# Patient Record
Sex: FEMALE | Race: WHITE
[De-identification: names, ages, dates, MRNs, and addresses within clinical notes are randomized per-mention and may not be internally consistent; named-entity substitution may affect disease eponyms.]

---

## 2017-02-07 ENCOUNTER — HOSPITAL ENCOUNTER (EMERGENCY)
Dept: HOSPITAL 62 - ER | Age: 52
LOS: 1 days | Discharge: HOME | End: 2017-02-08
Payer: MEDICARE

## 2017-02-07 DIAGNOSIS — N30.00: Primary | ICD-10-CM

## 2017-02-07 DIAGNOSIS — R53.1: ICD-10-CM

## 2017-02-07 DIAGNOSIS — R53.81: ICD-10-CM

## 2017-02-07 DIAGNOSIS — R11.2: ICD-10-CM

## 2017-02-07 LAB
ANION GAP SERPL CALC-SCNC: 14 MMOL/L (ref 5–19)
APPEARANCE UR: (no result)
BASOPHILS # BLD AUTO: 0 10^3/UL (ref 0–0.2)
BASOPHILS NFR BLD AUTO: 0.2 % (ref 0–2)
BILIRUB UR QL STRIP: NEGATIVE
BUN SERPL-MCNC: 15 MG/DL (ref 7–20)
CALCIUM: 10 MG/DL (ref 8.4–10.2)
CHLORIDE SERPL-SCNC: 108 MMOL/L (ref 98–107)
CO2 SERPL-SCNC: 21 MMOL/L (ref 22–30)
CREAT SERPL-MCNC: 0.5 MG/DL (ref 0.52–1.25)
EOSINOPHIL # BLD AUTO: 0.1 10^3/UL (ref 0–0.6)
EOSINOPHIL NFR BLD AUTO: 0.9 % (ref 0–6)
ERYTHROCYTE [DISTWIDTH] IN BLOOD BY AUTOMATED COUNT: 14 % (ref 11.5–14)
GLUCOSE SERPL-MCNC: 148 MG/DL (ref 75–110)
GLUCOSE UR STRIP-MCNC: NEGATIVE MG/DL
HCT VFR BLD CALC: 40.5 % (ref 36–47)
HGB BLD-MCNC: 13.5 G/DL (ref 12–15.5)
HGB HCT DIFFERENCE: 0
KETONES UR STRIP-MCNC: 20 MG/DL
LYMPHOCYTES # BLD AUTO: 1.9 10^3/UL (ref 0.5–4.7)
LYMPHOCYTES NFR BLD AUTO: 20.2 % (ref 13–45)
MCH RBC QN AUTO: 27.2 PG (ref 27–33.4)
MCHC RBC AUTO-ENTMCNC: 33.4 G/DL (ref 32–36)
MCV RBC AUTO: 82 FL (ref 80–97)
MONOCYTES # BLD AUTO: 0.5 10^3/UL (ref 0.1–1.4)
MONOCYTES NFR BLD AUTO: 5.5 % (ref 3–13)
NEUTROPHILS # BLD AUTO: 7 10^3/UL (ref 1.7–8.2)
NEUTS SEG NFR BLD AUTO: 73.2 % (ref 42–78)
NITRITE UR QL STRIP: POSITIVE
PH UR STRIP: 6 [PH] (ref 5–9)
POTASSIUM SERPL-SCNC: 4.8 MMOL/L (ref 3.6–5)
PROT UR STRIP-MCNC: NEGATIVE MG/DL
RBC # BLD AUTO: 4.97 10^6/UL (ref 3.72–5.28)
SODIUM SERPL-SCNC: 142.9 MMOL/L (ref 137–145)
SP GR UR STRIP: 1.01
UROBILINOGEN UR-MCNC: NEGATIVE MG/DL (ref ?–2)
WBC # BLD AUTO: 9.5 10^3/UL (ref 4–10.5)

## 2017-02-07 PROCEDURE — 87804 INFLUENZA ASSAY W/OPTIC: CPT

## 2017-02-07 PROCEDURE — 80048 BASIC METABOLIC PNL TOTAL CA: CPT

## 2017-02-07 PROCEDURE — 36415 COLL VENOUS BLD VENIPUNCTURE: CPT

## 2017-02-07 PROCEDURE — 96372 THER/PROPH/DIAG INJ SC/IM: CPT

## 2017-02-07 PROCEDURE — 85025 COMPLETE CBC W/AUTO DIFF WBC: CPT

## 2017-02-07 PROCEDURE — 81001 URINALYSIS AUTO W/SCOPE: CPT

## 2017-02-07 PROCEDURE — 51701 INSERT BLADDER CATHETER: CPT

## 2017-02-07 PROCEDURE — 99285 EMERGENCY DEPT VISIT HI MDM: CPT

## 2017-02-07 NOTE — ER DOCUMENT REPORT
ED General





<DOMINGO RUSSELL - Last Filed: 17 20:34>





- General


Mode of Arrival: Ambulatory


Information source: Patient


TRAVEL OUTSIDE OF THE [place] IN LAST 30 DAYS: No





- HPI


Patient complains to provider of: Generalized Malaise


Onset: Other - few days ago


Associated symptoms: Other - see above





<GUILLERMO WARNER - Last Filed: 17 23:05>





- General


Chief Complaint: Nausea/Vomiting


Stated Complaint: WEAKNESS


Notes: 


51-year-old female with history of MS presents to the ED via EMS complaining of 

generalized malaise that began a few days ago.  Patient states that she felt 

nauseous this morning and proceeded to have episodes of dry heaving and 

eventually vomited once.  Patient denies any diarrhea.  Patient states that 

this morning when she woke up she felt a little off and noticed that her blood 

sugar was 130 when it usually runs between 90 and 100.  Patient states that she 

is borderline diabetic.  Patient states that she has a history of UTIs. (GUILLERMO AWRNER)





- Related Data


Allergies/Adverse Reactions: 


 





latex [Latex] Allergy (Unknown, Verified 02/25/15 17:35)


 


Sulfa (Sulfonamide Antibiotics) Allergy (Verified 02/25/15 17:35)


 











Past Medical History





- General


Information source: Patient





- Social History


Smoking Status: Unknown if Ever Smoked


Family History: None, Reviewed & Not Pertinent





- Past Medical History


Cardiac Medical History: Reports: Hx Pulmonary Embolism - 2 years ago


Endocrine Medical History: Reports: Hx Diabetes Mellitus Type 2


Musculoskeltal Medical History: Reports Hx Multiple Sclerosis


Past Surgical History: Reports: Hx  Section - x2, Hx Cholecystectomy





- Immunizations


Hx Diphtheria, Pertussis, Tetanus Vaccination: No





<GUILLERMO WARNER - Last Filed: 17 23:05>





Review of Systems





- Review of Systems


Constitutional: See HPI, Malaise


EENT: No symptoms reported


Cardiovascular: No symptoms reported


Respiratory: No symptoms reported


Gastrointestinal: See HPI, Nausea, Vomiting.  denies: Diarrhea


Genitourinary: No symptoms reported


Female Genitourinary: No symptoms reported


Musculoskeletal: No symptoms reported


Skin: No symptoms reported


Hematologic/Lymphatic: No symptoms reported


Neurological/Psychological: No symptoms reported


-: Yes All other systems reviewed and negative





<GUILLERMO WARNER - Last Filed: 17 23:05>





Physical Exam





- General


General appearance: Alert


In distress: None





- HEENT


Head: Normocephalic, Atraumatic


Eyes: Normal


Extraocular movements intact: Yes


Pupils: PERRL





- Respiratory


Respiratory status: No respiratory distress


Breath sounds: Normal





- Cardiovascular


Rhythm: Regular


Heart sounds: Normal auscultation





- Abdominal


Inspection: Normal


Distension: No distension


Bowel sounds: Normal


Tenderness: Nontender





- Back


Back: Normal





- Extremities


General upper extremity: Normal inspection, Normal ROM


General lower extremity: Normal inspection, Normal ROM





- Neurological


Neuro grossly intact: Yes


Cognition: Normal


Orientation: AAOx4


Lily Coma Scale Eye Opening: Spontaneous


Patricia Coma Scale Verbal: Oriented


Patricia Coma Scale Motor: Obeys Commands


Lily Coma Scale Total: 15


Speech: Normal





- Psychological


Associated symptoms: Normal affect, Normal mood





- Skin


Skin Temperature: Warm


Skin Moisture: Dry


Skin Color: Normal





<GUILLERMO WARNER - Last Filed: 17 23:05>





- Vital signs


Vitals: 


 











Temp Pulse Resp BP Pulse Ox


 


 98.5 F   55 L  18   148/90 H  99 


 


 17 16:00  17 16:00  17 16:00  17 16:00  17 16:00








 (DOMINGO RUSSELL)





Course





- Laboratory


Result Diagrams: 


 17 18:30





 17 16:44





<DOMINGO RUSSELL - Last Filed: 17 20:34>





- Laboratory


Result Diagrams: 


 17 18:30





 17 16:44





<GUILLERMO WARNER - Last Filed: 17 23:05>





- Re-evaluation


Re-evalutation: 





17 20:3





I personally performed the services described in the documentation, reviewed 

and edited the documentation which was dictated to my scribe in my presence, 

and it accurately records my words and actions.








Patient with a history of multiple sclerosis bedbound nausea 2 episodes of 

vomiting and fatigue positive for urinary tract infection. No acute clinical 

concerns for pyelonephritis. Patient is able to tolerate by mouth fluids nausea 

vomiting is controlled fever shot of Rocephin will DC on Macrobid 1-2 day follow

-up primary care physician and discussed reasons for ED return sooner





 (DOMINGO RUSSELL)





- Vital Signs


Vital signs: 


 











Temp Pulse Resp BP Pulse Ox


 


 98.5 F   55 L  18   148/90 H  99 


 


 17 16:00  17 16:00  17 16:00  17 16:00  17 16:00








 (DOMINGO RUSSELL)


 (GUILLERMO WARNER)





- Laboratory


Laboratory results interpreted by me: 


 











  17





  16:44 19:02


 


Chloride  108 H 


 


Carbon Dioxide  21 L 


 


Creatinine  0.50 L 


 


Glucose  148 H 


 


Urine Ketones   20 H


 


Urine Nitrite   POSITIVE H


 


Ur Leukocyte Esterase   MODERATE H








 (DOMINGO RUSSELL)





Discharge





<DOMINGO RUSSELL - Last Filed: 17 20:34>





<GUILLERMO WARNER - Last Filed: 17 23:05>





- Discharge


Clinical Impression: 


Vomiting


Qualifiers:


 Vomiting type: unspecified Vomiting Intractability: unspecified Nausea presence

: with nausea Qualified Code(s): R11.2 - Nausea with vomiting, unspecified





UTI (urinary tract infection)


Qualifiers:


 Urinary tract infection type: acute cystitis Hematuria presence: without 

hematuria Qualified Code(s): N30.00 - Acute cystitis without hematuria





Condition: Stable


Disposition: HOME, SELF-CARE


Instructions:  Urinary Tract Infection ([facility]), Vomiting ([facility])


Additional Instructions: 


Urinary Tract Infection





     Your evaluation indicates that you have a urinary tract infection. This is 

due to germs growing in the bladder.  This is a common problem.


     This infection usually responds quickly to antibiotics.  Your antibiotic 

should be taken exactly as prescribed.  Drink plenty of fluids -- three to four 

quarts a day.


     Occasionally, a bladder anesthetic will be prescribed to help stop the 

feeling of urgency until the antibiotic has a chance to clear the infection.  

This may cause your urine to be dark orange.


     Certain urine infections require a culture.  If the doctor obtained a 

culture, the results will be back in two days.  You should call to see if a 

change in treatment is needed.


     A repeat urinalysis after you finish treatment is often recommended.  The 

physician will let you know if further testing is required.


     Call the doctor if you develop fever, chills, flank pain, inability to 

urinate, or blood in the urine.








Prescriptions: 


Nitrofurantoin/Nitrofuran Mac [Macrobid 100 mg Capsule] 1 tab PO BID #20 capsule


Referrals: 


LESLIE REED MD [Primary Care Provider] - Follow up tomorrow (in 1-2 

days return to er sooner for increasing worsening or new symptoms)





Scribe Documentation





- Scribe


Written by Yovanny:: Yovanny Acharya, 2017 1854


acting as scribe for :: Phil





<GUILLERMO WARNER - Last Filed: 17 23:05>

## 2017-02-08 VITALS — DIASTOLIC BLOOD PRESSURE: 64 MMHG | SYSTOLIC BLOOD PRESSURE: 124 MMHG

## 2017-12-21 ENCOUNTER — HOSPITAL ENCOUNTER (EMERGENCY)
Dept: HOSPITAL 62 - ER | Age: 52
LOS: 1 days | Discharge: ANOTHER HOSPITAL | End: 2017-12-22
Payer: MEDICARE

## 2017-12-21 DIAGNOSIS — G35: ICD-10-CM

## 2017-12-21 DIAGNOSIS — R05: ICD-10-CM

## 2017-12-21 DIAGNOSIS — Z86.711: ICD-10-CM

## 2017-12-21 DIAGNOSIS — R07.81: ICD-10-CM

## 2017-12-21 DIAGNOSIS — Z91.040: ICD-10-CM

## 2017-12-21 DIAGNOSIS — Z74.01: ICD-10-CM

## 2017-12-21 DIAGNOSIS — L53.9: ICD-10-CM

## 2017-12-21 DIAGNOSIS — Z88.2: ICD-10-CM

## 2017-12-21 DIAGNOSIS — J96.01: Primary | ICD-10-CM

## 2017-12-21 DIAGNOSIS — J18.9: ICD-10-CM

## 2017-12-21 DIAGNOSIS — R21: ICD-10-CM

## 2017-12-21 DIAGNOSIS — E11.9: ICD-10-CM

## 2017-12-21 LAB
ALBUMIN SERPL-MCNC: 4.2 G/DL (ref 3.5–5)
ALP SERPL-CCNC: 107 U/L (ref 38–126)
ALT SERPL-CCNC: 33 U/L (ref 9–52)
ANION GAP SERPL CALC-SCNC: 13 MMOL/L (ref 5–19)
AST SERPL-CCNC: 20 U/L (ref 14–36)
BASE EXCESS BLDA CALC-SCNC: -0.6 MMOL/L
BASE EXCESS BLDV CALC-SCNC: -0.4 MMOL/L
BASOPHILS # BLD AUTO: 0.1 10^3/UL (ref 0–0.2)
BASOPHILS NFR BLD AUTO: 1 % (ref 0–2)
BILIRUB DIRECT SERPL-MCNC: 0.3 MG/DL (ref 0–0.4)
BILIRUB SERPL-MCNC: 0.9 MG/DL (ref 0.2–1.3)
BUN SERPL-MCNC: 15 MG/DL (ref 7–20)
CALCIUM: 10.2 MG/DL (ref 8.4–10.2)
CHLORIDE SERPL-SCNC: 106 MMOL/L (ref 98–107)
CO2 SERPL-SCNC: 22 MMOL/L (ref 22–30)
CREAT SERPL-MCNC: 0.53 MG/DL (ref 0.52–1.25)
EOSINOPHIL # BLD AUTO: 0.2 10^3/UL (ref 0–0.6)
EOSINOPHIL NFR BLD AUTO: 2.6 % (ref 0–6)
ERYTHROCYTE [DISTWIDTH] IN BLOOD BY AUTOMATED COUNT: 14.1 % (ref 11.5–14)
GLUCOSE SERPL-MCNC: 104 MG/DL (ref 75–110)
HCO3 BLDV-SCNC: 23.6 MMOL/L (ref 20–32)
HCT VFR BLD CALC: 38.7 % (ref 36–47)
HGB BLD-MCNC: 12.9 G/DL (ref 12–15.5)
HGB HCT DIFFERENCE: 0
LYMPHOCYTES # BLD AUTO: 2.1 10^3/UL (ref 0.5–4.7)
LYMPHOCYTES NFR BLD AUTO: 22.2 % (ref 13–45)
MCH RBC QN AUTO: 27.6 PG (ref 27–33.4)
MCHC RBC AUTO-ENTMCNC: 33.4 G/DL (ref 32–36)
MCV RBC AUTO: 83 FL (ref 80–97)
MONOCYTES # BLD AUTO: 0.5 10^3/UL (ref 0.1–1.4)
MONOCYTES NFR BLD AUTO: 5.3 % (ref 3–13)
NEUTROPHILS # BLD AUTO: 6.5 10^3/UL (ref 1.7–8.2)
NEUTS SEG NFR BLD AUTO: 68.9 % (ref 42–78)
PCO2 BLDV: 36.5 MMHG (ref 35–63)
PH BLDV: 7.43 [PH] (ref 7.3–7.42)
POTASSIUM SERPL-SCNC: 4 MMOL/L (ref 3.6–5)
PROT SERPL-MCNC: 7.6 G/DL (ref 6.3–8.2)
PROTHROMBIN TIME: 13.4 SEC (ref 11.4–15.4)
RBC # BLD AUTO: 4.68 10^6/UL (ref 3.72–5.28)
SAO2 % BLDA: 94.6 % (ref 94–98)
SODIUM SERPL-SCNC: 141 MMOL/L (ref 137–145)
WBC # BLD AUTO: 9.4 10^3/UL (ref 4–10.5)

## 2017-12-21 PROCEDURE — 99291 CRITICAL CARE FIRST HOUR: CPT

## 2017-12-21 PROCEDURE — 82803 BLOOD GASES ANY COMBINATION: CPT

## 2017-12-21 PROCEDURE — 87040 BLOOD CULTURE FOR BACTERIA: CPT

## 2017-12-21 PROCEDURE — 71275 CT ANGIOGRAPHY CHEST: CPT

## 2017-12-21 PROCEDURE — 71010: CPT

## 2017-12-21 PROCEDURE — 80053 COMPREHEN METABOLIC PANEL: CPT

## 2017-12-21 PROCEDURE — 93010 ELECTROCARDIOGRAM REPORT: CPT

## 2017-12-21 PROCEDURE — 87186 SC STD MICRODIL/AGAR DIL: CPT

## 2017-12-21 PROCEDURE — 83605 ASSAY OF LACTIC ACID: CPT

## 2017-12-21 PROCEDURE — 85610 PROTHROMBIN TIME: CPT

## 2017-12-21 PROCEDURE — 51701 INSERT BLADDER CATHETER: CPT

## 2017-12-21 PROCEDURE — 96360 HYDRATION IV INFUSION INIT: CPT

## 2017-12-21 PROCEDURE — 93005 ELECTROCARDIOGRAM TRACING: CPT

## 2017-12-21 PROCEDURE — 85025 COMPLETE CBC W/AUTO DIFF WBC: CPT

## 2017-12-21 PROCEDURE — 87086 URINE CULTURE/COLONY COUNT: CPT

## 2017-12-21 PROCEDURE — 87088 URINE BACTERIA CULTURE: CPT

## 2017-12-21 PROCEDURE — 81001 URINALYSIS AUTO W/SCOPE: CPT

## 2017-12-21 PROCEDURE — 87077 CULTURE AEROBIC IDENTIFY: CPT

## 2017-12-21 PROCEDURE — 36415 COLL VENOUS BLD VENIPUNCTURE: CPT

## 2017-12-21 NOTE — ER DOCUMENT REPORT
ED General





- General


Chief Complaint: Shortness Of Breath


Stated Complaint: RESPIRATORY DISTRESS


Time Seen by Provider: 17 22:05


Notes: 





52-year-old lady with debilitating multiple sclerosis, bedbound at baseline 

with history of multiple infections, presenting with 1 week of "not feeling good

" constant worsening now associated with moist cough and right-sided pleuritic 

chest pain.  Hypoxic when EMS arrived.  They did not describe any hypertension 

or tachycardia.  No measured fevers.  She has areas of concern on her skin in 

her left armpit and right buttock, the son states the right buttock is just a 

rash he has been addressing it, no skin breakdown.


TRAVEL OUTSIDE OF THE [place] IN LAST 30 DAYS: No





- Related Data


Allergies/Adverse Reactions: 


 





latex [Latex] Allergy (Unknown, Verified 02/25/15 17:35)


 


Sulfa (Sulfonamide Antibiotics) Allergy (Verified 02/25/15 17:35)


 











Past Medical History





- Social History


Smoking Status: Never Smoker


Family History: None, Reviewed & Not Pertinent





- Past Medical History


Cardiac Medical History: Reports: Hx Pulmonary Embolism - 2 years ago


   Denies: Hx Atrial Fibrillation, Hx Congestive Heart Failure, Hx Heart Attack

, Hx Hypercholesterolemia, Hx Hypertension


Pulmonary Medical History: 


   Denies: Hx Asthma, Hx Bronchitis, Hx COPD, Hx Pneumonia, Hx Respiratory 

Failure, Hx Sleep Apnea, Hx Tuberculosis


Neurological Medical History: Denies: Hx Cerebrovascular Accident, Hx Migraine, 

Hx Seizures


Endocrine Medical History: Reports: Hx Diabetes Mellitus Type 2.  Denies: Hx 

Diabetes Mellitus Type 1


Renal/ Medical History: Denies: Hx End Stage Renal Disease, Hx Kidney Stones


Malignancy Medical History: Denies: Hx Leukemia, Hx Lung Cancer


GI Medical History: Denies: Hx Gastroesophageal Reflux Disease, Hx Hiatal Hernia

, Hx Ulcer


Musculoskeltal Medical History: Denies Hx Arthritis, Reports Hx Multiple 

Sclerosis


Psychiatric Medical History: 


   Denies: Hx Attention Deficit Hyperactivity Disorder, Hx Bipolar Disorder, Hx 

Dementia, Hx Depression, Hx Schizophrenia


Infectious Medical History: Denies: Hx HIV


Past Surgical History: Reports: Hx  Section - x2, Hx Cholecystectomy.  

Denies: Hx Appendectomy, Hx Bowel Surgery, Hx Coronary Artery Bypass Graft, Hx 

Gastric Bypass Surgery, Hx Herniorrhaphy, Hx Hysterectomy, Hx Mastectomy, Hx 

Pacemaker, Hx Tonsillectomy, Hx Tubal Ligation





- Immunizations


Hx Diphtheria, Pertussis, Tetanus Vaccination: No





Review of Systems





- Review of Systems


Notes: 





REVIEW OF SYSTEMS





GEN: D chills weakness


ENT: Denies sore throat, nasal discharge, ear pain


EYES: Denies blurry vision, eye pain, discharge


CV: Denies chest pain, palpitations, edema


RESP: D cough shortness of breath


GI: Denies abdominal pain, nausea, vomiting, diarrhea


MSK: Denies joint pain/swelling, edema, 


SKIN: Template redness


LYMPH: Denies swollen glands/lymph nodes


NEURO: Chronic debilitating weakness


PSYCH: Denies depression, suicidal or homicidal ideation








PHYSICAL EXAMINATION





General: Mild respiratory distress chronically ill-appearing


Head: Atraumatic, normocephalic


ENT: Mouth normal, oropharynx moist, no exudates or tonsillar enlargement


Eyes: Conjunctiva normal, pupils equal, lids normal


Neck: No JVD, supple, no guarding


CVS: Normal rate, regular rhythm, no murmurs


Resp: Right-sided rhonchi, quite wet cough


GI: Nondistended, soft, no tenderness to palpation, no rebound or guarding


Ext: No deformities, no edema, normal range of motion in upper and lower ext


Back: No CVA or midline TTP


Skin: Erythema and maceration in the left armpit


Lymphatic: No lymphadeopathy noted


Neuro: Awake, alert.  Face symmetric.  GCS 15.





Physical Exam





- Vital signs


Vitals: 


 











Resp


 


 20 


 


 17 22:10














Course





- Re-evaluation


Re-evalutation: 





17 22:07


Debilitated bedbound 52-year-old female presenting with signs and symptoms of 

pneumonia, sepsis.


Sepsis protocol initiated.


17 22:52


Patient reassessed.  Difficulty in getting IV access.  Her chest x-ray does not 

show a pneumonia.  We have not obtain a temperature either yet.  Urine has not 

been done.


On reexamining her, she has very low chest rise and is having trouble getting 

words out.  She has a 4-6 L oxygen requirement.  I think her MS is affecting 

her ability to breathe so I ordered a negative inspiratory force measurement.  

If this is decreased she will have to go to a higher level of care which has 

neurology.


17 23:24


Patient's negative inspiratory force is 3.  This is compatible with 

neuromuscular respiratory failure.  She is stable on 6 L of nasal cannula 

oxygen but is having trouble speaking.  Ordered ABG and will transfer patient.  

Spoke with [facility] transfer center at 11:20 PM.


17 01:22


Continues to be stable.  I looked at the CT scan and no it does show some fluid 

in the left subsegmental bronchi do not see any pulmonary emboli.  Urine is 

positive and I have not given antibiotics yet.  I verbalize an order for 1 g of 

Rocephin to the paramedics.  I am comfortable with this given that the patient 

is not septic, she will be treated on the way to [facility].  [facility] 

transport is here for the patient and she is stable for transfer.


17 01:25








- Vital Signs


Vital signs: 


 











Temp Pulse Resp BP Pulse Ox


 


       18   134/78 H  95 


 


       17 01:06  17 23:51  17 01:06














- Laboratory


Result Diagrams: 


 17 22:25





 17 22:25


Laboratory results interpreted by me: 


 











  17





  22:25 22:25 22:25


 


RDW  14.1 H  


 


ABG pO2   


 


VBG pH    7.43 H


 


Lactic Acid   0.6 L 


 


Urine Protein   


 


Urine Blood   


 


Urine Nitrite   


 


Urine Urobilinogen   


 


Ur Leukocyte Esterase   














  17





  23:20 23:40


 


RDW  


 


ABG pO2  69.6 L 


 


VBG pH  


 


Lactic Acid  


 


Urine Protein   30 H


 


Urine Blood   LARGE H


 


Urine Nitrite   POSITIVE H


 


Urine Urobilinogen   4.0 H


 


Ur Leukocyte Esterase   MODERATE H














- Diagnostic Test


Radiology reviewed: Image reviewed, Reports reviewed





- EKG Interpretation by Me


EKG shows normal: Sinus rhythm


Rate: Normal


Rhythm: NSR


When compared to previous EKG there are: No significant change - Nonspecific 

lateral T-wave flattening unchanged





Critical Care Note





- Critical Care Note


Total time excluding time spent on procedures (mins): 35 - The above patient is 

critically ill.  Not including procedures, but including direct re-evaluations, 

speaking with patient and/or consultants, interpreting results, and documenting

, I spent the total amount of minute listed listed above on critical care time





Discharge





- Discharge


Clinical Impression: 


 Acute respiratory failure with hypoxia





Condition: Fair


Disposition: [facility]


Referrals: 


MEG MCCARTHY MD [Primary Care Provider] - Follow up as needed

## 2017-12-21 NOTE — RADIOLOGY REPORT (SQ)
EXAM DESCRIPTION:  CHEST SINGLE VIEW



COMPLETED DATE/TIME:  12/21/2017 10:26 pm



REASON FOR STUDY:  SOB



COMPARISON:  9/22/2016



EXAM PARAMETERS:  NUMBER OF VIEWS: One view.

TECHNIQUE: Single frontal radiographic view of the chest acquired.

RADIATION DOSE: NA

LIMITATIONS: None.



FINDINGS:  LUNGS AND PLEURA: No acute opacities, masses or pneumothorax. No pleural effusion.

MEDIASTINUM AND HILAR STRUCTURES: No masses.  Contour normal.

HEART AND VASCULAR STRUCTURES: Heart normal in size.  Normal vasculature.

BONES: No acute findings.

HARDWARE: None in the chest.

OTHER: No other significant finding.



IMPRESSION:  NO ACUTE RADIOGRAPHIC FINDING IN THE CHEST.



TECHNICAL DOCUMENTATION:  JOB ID:  [id number]

[id number]

 2011 [employer]- All Rights Reserved

## 2017-12-22 VITALS — DIASTOLIC BLOOD PRESSURE: 78 MMHG | SYSTOLIC BLOOD PRESSURE: 134 MMHG

## 2017-12-22 LAB
APPEARANCE UR: (no result)
BILIRUB UR QL STRIP: NEGATIVE
GLUCOSE UR STRIP-MCNC: NEGATIVE MG/DL
KETONES UR STRIP-MCNC: NEGATIVE MG/DL
NITRITE UR QL STRIP: POSITIVE
PH UR STRIP: 5 [PH] (ref 5–9)
PROT UR STRIP-MCNC: 30 MG/DL
SP GR UR STRIP: 1.02
UROBILINOGEN UR-MCNC: 4 MG/DL (ref ?–2)

## 2017-12-22 NOTE — EKG REPORT
SEVERITY:- ABNORMAL ECG -

SINUS RHYTHM

NONSPECIFIC T ABNORMALITIES, ANT-LAT LEADS

:

Confirmed by: Wilson Polanco MD 22-Dec-2017 07:49:18

## 2017-12-22 NOTE — RADIOLOGY REPORT (SQ)
EXAM DESCRIPTION:

CTA CHEST



CLINICAL HISTORY:

52 years Female, eval for PE. R pleuritic chest pain.



COMPARISON:

CR, same day.



TECHNIQUE:

100 mL Isovue-370 IV contrast. Multiplanar reformatted. This exam

was performed according to our departmental dose-optimization

program, which includes automated exposure control, adjustment of

the mA and/or kV according to patient size and/or use of

iterative reconstruction technique.



FINDINGS:

Small left infrahilar, left lower lobar consolidate, and small

streakiness of bilateral lung bases.



No pulmonary embolus. No right ventricular strain.



Inferior neck, axillae, mediastinum, cholecystectomy clips, upper

abdomen, and musculoskeleton appear otherwise unremarkable.



IMPRESSION:

Small left lower lobar pneumonia. No pulmonary embolus.

## 2019-01-24 ENCOUNTER — HOSPITAL ENCOUNTER (INPATIENT)
Dept: HOSPITAL 62 - ER | Age: 54
LOS: 5 days | Discharge: HOME HEALTH | DRG: 690 | End: 2019-01-29
Attending: INTERNAL MEDICINE | Admitting: INTERNAL MEDICINE
Payer: MEDICARE

## 2019-01-24 DIAGNOSIS — Z82.61: ICD-10-CM

## 2019-01-24 DIAGNOSIS — G47.10: ICD-10-CM

## 2019-01-24 DIAGNOSIS — N30.00: Primary | ICD-10-CM

## 2019-01-24 DIAGNOSIS — E66.01: ICD-10-CM

## 2019-01-24 DIAGNOSIS — D64.9: ICD-10-CM

## 2019-01-24 DIAGNOSIS — Z88.2: ICD-10-CM

## 2019-01-24 DIAGNOSIS — E03.9: ICD-10-CM

## 2019-01-24 DIAGNOSIS — Z79.899: ICD-10-CM

## 2019-01-24 DIAGNOSIS — Z91.040: ICD-10-CM

## 2019-01-24 DIAGNOSIS — B96.1: ICD-10-CM

## 2019-01-24 DIAGNOSIS — E11.9: ICD-10-CM

## 2019-01-24 DIAGNOSIS — G35: ICD-10-CM

## 2019-01-24 DIAGNOSIS — L21.9: ICD-10-CM

## 2019-01-24 DIAGNOSIS — Z79.82: ICD-10-CM

## 2019-01-24 DIAGNOSIS — Z86.711: ICD-10-CM

## 2019-01-24 DIAGNOSIS — B95.1: ICD-10-CM

## 2019-01-24 DIAGNOSIS — Z83.3: ICD-10-CM

## 2019-01-24 DIAGNOSIS — Z88.8: ICD-10-CM

## 2019-01-24 DIAGNOSIS — Z91.030: ICD-10-CM

## 2019-01-24 DIAGNOSIS — J30.9: ICD-10-CM

## 2019-01-24 DIAGNOSIS — E55.9: ICD-10-CM

## 2019-01-24 DIAGNOSIS — Z91.19: ICD-10-CM

## 2019-01-24 DIAGNOSIS — Z90.49: ICD-10-CM

## 2019-01-24 DIAGNOSIS — Z80.9: ICD-10-CM

## 2019-01-24 DIAGNOSIS — Z82.49: ICD-10-CM

## 2019-01-24 DIAGNOSIS — Z83.2: ICD-10-CM

## 2019-01-24 DIAGNOSIS — K59.09: ICD-10-CM

## 2019-01-24 LAB
ADD MANUAL DIFF: NO
ALBUMIN SERPL-MCNC: 4.3 G/DL (ref 3.5–5)
ALP SERPL-CCNC: 149 U/L (ref 38–126)
ALT SERPL-CCNC: 33 U/L (ref 9–52)
ANION GAP SERPL CALC-SCNC: 12 MMOL/L (ref 5–19)
APPEARANCE UR: (no result)
APTT PPP: (no result) S
ARTERIAL BLOOD FIO2: (no result)
ARTERIAL BLOOD H2CO3: 0.95 MMOL/L (ref 1.05–1.35)
ARTERIAL BLOOD HCO3: 19 MMOL/L (ref 20–24)
ARTERIAL BLOOD PCO2: 31.7 MMHG (ref 35–45)
ARTERIAL BLOOD PH: 7.4 (ref 7.35–7.45)
ARTERIAL BLOOD PO2: 100 MMHG (ref 80–100)
ARTERIAL BLOOD TOTAL CO2: 19.9 MMOL/L (ref 21–25)
AST SERPL-CCNC: 55 U/L (ref 14–36)
BASE EXCESS BLDA CALC-SCNC: -4.8 MMOL/L
BASOPHILS # BLD AUTO: 0 10^3/UL (ref 0–0.2)
BASOPHILS NFR BLD AUTO: 0.3 % (ref 0–2)
BILIRUB DIRECT SERPL-MCNC: 0.6 MG/DL (ref 0–0.4)
BILIRUB SERPL-MCNC: 0.8 MG/DL (ref 0.2–1.3)
BILIRUB UR QL STRIP: NEGATIVE
BUN SERPL-MCNC: 21 MG/DL (ref 7–20)
CALCIUM: 9.6 MG/DL (ref 8.4–10.2)
CHLORIDE SERPL-SCNC: 107 MMOL/L (ref 98–107)
CK MB SERPL-MCNC: 0.61 NG/ML (ref ?–4.55)
CK SERPL-CCNC: 90 U/L (ref 30–135)
CO2 SERPL-SCNC: 21 MMOL/L (ref 22–30)
EOSINOPHIL # BLD AUTO: 0 10^3/UL (ref 0–0.6)
EOSINOPHIL NFR BLD AUTO: 0 % (ref 0–6)
ERYTHROCYTE [DISTWIDTH] IN BLOOD BY AUTOMATED COUNT: 14.2 % (ref 11.5–14)
GLUCOSE SERPL-MCNC: 175 MG/DL (ref 75–110)
GLUCOSE UR STRIP-MCNC: NEGATIVE MG/DL
HCT VFR BLD CALC: 42.7 % (ref 36–47)
HGB BLD-MCNC: 14 G/DL (ref 12–15.5)
KETONES UR STRIP-MCNC: 20 MG/DL
LYMPHOCYTES # BLD AUTO: 0.5 10^3/UL (ref 0.5–4.7)
LYMPHOCYTES NFR BLD AUTO: 7.1 % (ref 13–45)
MCH RBC QN AUTO: 26.7 PG (ref 27–33.4)
MCHC RBC AUTO-ENTMCNC: 32.8 G/DL (ref 32–36)
MCV RBC AUTO: 82 FL (ref 80–97)
MONOCYTES # BLD AUTO: 0.3 10^3/UL (ref 0.1–1.4)
MONOCYTES NFR BLD AUTO: 3.8 % (ref 3–13)
NEUTROPHILS # BLD AUTO: 6.9 10^3/UL (ref 1.7–8.2)
NEUTS SEG NFR BLD AUTO: 88.8 % (ref 42–78)
NITRITE UR QL STRIP: POSITIVE
NT PRO BNP: 487 PG/ML (ref 5–900)
PH UR STRIP: 7 [PH] (ref 5–9)
PLATELET # BLD: 248 10^3/UL (ref 150–450)
POTASSIUM SERPL-SCNC: 4.4 MMOL/L (ref 3.6–5)
PROT SERPL-MCNC: 8.1 G/DL (ref 6.3–8.2)
PROT UR STRIP-MCNC: >=500 MG/DL
RBC # BLD AUTO: 5.24 10^6/UL (ref 3.72–5.28)
SAO2 % BLDA: 97.6 % (ref 94–98)
SODIUM SERPL-SCNC: 140.1 MMOL/L (ref 137–145)
SP GR UR STRIP: 1.02
TOTAL CELLS COUNTED % (AUTO): 100 %
UROBILINOGEN UR-MCNC: 2 MG/DL (ref ?–2)
WBC # BLD AUTO: 7.7 10^3/UL (ref 4–10.5)

## 2019-01-24 PROCEDURE — 82550 ASSAY OF CK (CPK): CPT

## 2019-01-24 PROCEDURE — 80053 COMPREHEN METABOLIC PANEL: CPT

## 2019-01-24 PROCEDURE — 83605 ASSAY OF LACTIC ACID: CPT

## 2019-01-24 PROCEDURE — 87086 URINE CULTURE/COLONY COUNT: CPT

## 2019-01-24 PROCEDURE — 85025 COMPLETE CBC W/AUTO DIFF WBC: CPT

## 2019-01-24 PROCEDURE — 82565 ASSAY OF CREATININE: CPT

## 2019-01-24 PROCEDURE — 80202 ASSAY OF VANCOMYCIN: CPT

## 2019-01-24 PROCEDURE — 81001 URINALYSIS AUTO W/SCOPE: CPT

## 2019-01-24 PROCEDURE — 82803 BLOOD GASES ANY COMBINATION: CPT

## 2019-01-24 PROCEDURE — 93010 ELECTROCARDIOGRAM REPORT: CPT

## 2019-01-24 PROCEDURE — 80048 BASIC METABOLIC PNL TOTAL CA: CPT

## 2019-01-24 PROCEDURE — 82553 CREATINE MB FRACTION: CPT

## 2019-01-24 PROCEDURE — 87088 URINE BACTERIA CULTURE: CPT

## 2019-01-24 PROCEDURE — 99285 EMERGENCY DEPT VISIT HI MDM: CPT

## 2019-01-24 PROCEDURE — 71045 X-RAY EXAM CHEST 1 VIEW: CPT

## 2019-01-24 PROCEDURE — 83880 ASSAY OF NATRIURETIC PEPTIDE: CPT

## 2019-01-24 PROCEDURE — 87186 SC STD MICRODIL/AGAR DIL: CPT

## 2019-01-24 PROCEDURE — 36415 COLL VENOUS BLD VENIPUNCTURE: CPT

## 2019-01-24 PROCEDURE — 93005 ELECTROCARDIOGRAM TRACING: CPT

## 2019-01-24 PROCEDURE — 82962 GLUCOSE BLOOD TEST: CPT

## 2019-01-24 PROCEDURE — 87040 BLOOD CULTURE FOR BACTERIA: CPT

## 2019-01-24 RX ADMIN — LEVOFLOXACIN SCH MLS/HR: 500 INJECTION, SOLUTION INTRAVENOUS at 15:18

## 2019-01-24 RX ADMIN — SODIUM CHLORIDE PRN MLS/HR: 9 INJECTION, SOLUTION INTRAVENOUS at 14:42

## 2019-01-24 RX ADMIN — CEFEPIME HYDROCHLORIDE SCH MLS/HR: 2 INJECTION, SOLUTION INTRAVENOUS at 14:41

## 2019-01-24 RX ADMIN — VANCOMYCIN HYDROCHLORIDE SCH MLS/HR: 1 INJECTION, POWDER, LYOPHILIZED, FOR SOLUTION INTRAVENOUS at 18:06

## 2019-01-24 NOTE — ER DOCUMENT REPORT
Sepsis





- Sepsis Documentation


Sepsis Patient: Yes





- Vital Signs


Interpretation: Tachycardic, Febrile





- Cardiovascular


Peripheral Pulse Strength: Normal


Capillary refill: Delayed


Rhythm: Tachycardia


Heart Sounds: Normal auscultation





- Respiratory


Breath Sounds: Rhonchi, Decreased air movement


Respiratory Status: Depressed respirations





- Skin


Skin Color: Pale, Mottled

## 2019-01-24 NOTE — RADIOLOGY REPORT (SQ)
EXAM DESCRIPTION: 



XR CHEST 1 VIEW



COMPLETED DATE/TME:  01/24/2019 02:01



CLINICAL HISTORY: 



53 years, Female, Cough



COMPARISON:

None.



NUMBER OF VIEWS:

One



TECHNIQUE:

AP view of the chest



LIMITATIONS:

None.



FINDINGS:



The lungs are clear. There are no pleural abnormalities. The

cardiac silhouette and pulmonary vessels are normal.



IMPRESSION:



No acute cardiopulmonary disease.

 



copyright 2011 [employer]- All Rights Reserved

## 2019-01-24 NOTE — EKG REPORT
SEVERITY:- ABNORMAL ECG -

SINUS TACHYCARDIA

NONSPECIFIC REPOL ABNORMALITY, INFERIOR LEADS

:

Confirmed by: Mahnaz Cortez MD 24-Jan-2019 08:04:07

## 2019-01-24 NOTE — EKG REPORT
SEVERITY:- OTHERWISE NORMAL ECG -

SINUS TACHYCARDIA

:

Confirmed by: Mahnaz Cortez MD 24-Jan-2019 08:03:57

## 2019-01-24 NOTE — PDOC H&P
History of Present Illness


Admission Date/PCP: 


  19 05:52





  [facility]





Patient complains of: Shortness of breath and weakness


History of Present Illness: 


VI PAIGE is a 53 year old female known to my practice but not compliant

with follow up in the office presented to the ED with family complaining about 

worsening difficulty with breathing, generalized weakness and at the time of my 

evaluation associated malodor to her urine for couple of days. She reported 

episodes of fever, chills, nausea, poor appetite and oral intake. She reported 

chest and sinus congestion, associated  coughing with expectoration difficulty. 

No headache or facial pain. She denied any flank pain or definite pain with 

urination. She denied any blood in her urine. Her initial evaluation in the ED 

was significant for tachycardia, tachypnea, minimal verbal response and 

demonstrated alteration in her mental alertness. There was concern for possible 

sepsis due to associated low blood pressure and fever. Her laboratory evaluation

with catheterized urine was suggestive of UTI with elevated BUN. Her morbidities

include Multiple sclerosis with fatigue and malaise, Diabetes Mellitus type 2, 

Hypothyroidism, vitamin D deficiency, chronic constipation, Anemia, Allergic rh

initis, and morbid obesity. She was advised hospitalization for further 

evaluation and management.





Past Medical History


Cardiac Medical History: Reports: Pulmonary Embolism - 2 years ago


   Denies: Atrial Fibrillation, Congestive Heart Failure, Myocardial Infarction,

Hyperlipidema, Hypertension


Pulmonary Medical History: Reports: None


   Denies: Asthma, Bronchitis, Chronic Obstructive Pulmonary Disease (COPD), 

Pneumonia, Respiratory Failure, Sleep Apnea, Tuberculosis


EENT Medical History: Reports: None


Neurological Medical History: Reports: None


   Denies: Migraine, Seizures


Endocrine Medical History: Reports: Diabetes Mellitus Type 2, Hypothyroidism, 

Obesity, Other - Vitamin D deficiency


   Denies: Diabetes Mellitus Type 1


Renal/ Medical History: Reports: None


   Denies: End Stage Renal Disease


Malignancy Medical History: Reports: None


   Denies: Leukemia, Lung Cancer


GI Medical History: Reports: None, Other - chronic constipation


   Denies: Gastroesophageal Reflux Disease, Hiatal Hernia


Musculoskeltal Medical History: 


   Denies: Arthritis


Skin Medical History: Reports: None


Psychiatric Medical History: Reports: None, Other - Hypersomnia


   Denies: Attention Deficit Hyperactivity Disorder, Bipolar Disorder, Dementia,

Depression


Traumatic Medical History: Reports: None


Hematology: Reports: Anemia


   Denies: Hemophilia, Sickle Cell Disease


Infectious Medical History: Reports: None


   Denies: HIV





Past Surgical History


Past Surgical History: Reports:  Section - x2, Cholecystectomy


   Denies: Appendectomy, Coronary Artery Bypass Graft, Gastric Bypass Surgery, 

Herniorrhaphy, Hysterectomy, Mastectomy, Pacemaker, Tonsillectomy, Tubal 

Ligation





Social History


Lives with: Family


Smoking Status: Never Smoker


Frequency of Alcohol Use: None


Hx Recreational Drug Use: No


Hx Prescription Drug Abuse: No





- Advance Directive


Resuscitation Status: Full Code





Family History


Family History: None, Reviewed & Not Pertinent


Parental Family History Reviewed: Yes


Children Family History Reviewed: Yes


Sibling(s) Family History Reviewed.: Yes





Medication/Allergy


Home Medications: 








Acetaminophen [Tylenol Extra Strength 500 mg Tablet] 1,000 mg PO Q6 19 


Baclofen [Baclofen 10 mg Tablet] 10 mg PO TID 19 


Modafinil [Provigil] 200 mg PO DAILY 19 








Allergies/Adverse Reactions: 


                                        





latex [Latex] Allergy (Intermediate, Verified 19 14:06)


   RASH/BLISTER


Sulfa (Sulfonamide Antibiotics) Allergy (Intermediate, Verified 19 14:06)


   RASH/HIVES











Review of Systems


Constitutional: PRESENT: anorexia, fatigue, fever(s), weakness


Eyes: ABSENT: visual disturbances


Ears: ABSENT: hearing changes


Nose, Mouth, and Throat: ABSENT: as per HPI, headache(s), mouth pain, sore 

throat, vertigo, other


Cardiovascular: ABSENT: chest pain, dyspnea on exertion, edema, orthropnea, 

palpitations


Respiratory: PRESENT: cough, dyspnea


Gastrointestinal: PRESENT: nausea.  ABSENT: as per HPI, abdominal pain, 

bloating, coffee ground emesis, constipation, diarrhea, dysphagia, heartburn, 

hematemesis, hematochezia, melena, vomiting, other


Genitourinary: PRESENT: other - malodor.  ABSENT: as per HPI, difficulty 

urinating, dysuria, hematuria, nocturia


Musculoskeletal: PRESENT: deformity - related to spastic component of her 

multiple sclerosis.


Integumentary: PRESENT: erythema - left forearm aroun her brace device..  

ABSENT: as per HPI, diaphoresis, lesions, pruritus, rash, wounds, other


Neurological: PRESENT: confusion


Psychiatric: ABSENT: anxiety, depression, homidical ideation, suicidal ideation


Endocrine: ABSENT: cold intolerance, heat intolerance, polydipsia, polyuria


Hematologic/Lymphatic: ABSENT: easy bleeding, easy bruising, lymphadenopathy


Allergic/Immunologic: ABSENT: seasonal rhinorrhea





Physical Exam


Vital Signs: 


                                        











Temp Pulse Resp BP Pulse Ox


 


 98.8 F   118 H  31 H  98/72 L  97 


 


 19 16:00  19 17:23  19 16:00  19 16:00  19 16:00








                                 Intake & Output











 19





 06:59 06:59 06:59


 


Intake Total  1000 150


 


Balance  1000 150


 


Weight  83.4 kg 











General appearance: PRESENT: no acute distress, well-developed, well-nourished


Head exam: PRESENT: atraumatic, normocephalic


Eye exam: PRESENT: conjunctiva pink, EOMI, PERRLA.  ABSENT: scleral icterus


Ear exam: PRESENT: normal external ear exam


Mouth exam: PRESENT: dry mucosa


Respiratory exam: PRESENT: clear to auscultation elver, decreased breath sounds - 

at lung bases


Cardiovascular exam: PRESENT: RRR, +S1, +S2, tachycardia.  ABSENT: diastolic 

murmur, systolic murmur


Vascular exam: PRESENT: normal capillary refill.  ABSENT: pallor


GI/Abdominal exam: PRESENT: normal bowel sounds, soft.  ABSENT: distended, 

guarding, mass, organolmegaly, rebound, tenderness


Rectal exam: PRESENT: deferred


Gentrourinary exam: PRESENT: indwelling catheter - with discolored concentrated 

urine.


Extremities exam: ABSENT: pedal edema


Neurological exam: PRESENT: alert, awake, oriented to person, oriented to place,

 oriented to time, oriented to situation, CN II-XII grossly intact.  ABSENT: 

motor sensory deficit


Psychiatric exam: PRESENT: appropriate affect, normal mood.  ABSENT: homicidal 

ideation, suicidal ideation


Skin exam: PRESENT: dry, erythema - around her forearm brace device, warm





Results


Laboratory Results: 


                                        





                                 19 01:48 





                                 19 01:48 





                                        











  19





  01:48 01:48 01:48


 


WBC  7.7  


 


RBC  5.24  


 


Hgb  14.0  


 


Hct  42.7  


 


MCV  82  


 


MCH  26.7 L  


 


MCHC  32.8  


 


RDW  14.2 H  


 


Plt Count  248  


 


Seg Neutrophils %  88.8 H  


 


Lymphocytes %  7.1 L  


 


Monocytes %  3.8  


 


Eosinophils %  0.0  


 


Basophils %  0.3  


 


Absolute Neutrophils  6.9  


 


Absolute Lymphocytes  0.5  


 


Absolute Monocytes  0.3  


 


Absolute Eosinophils  0.0  


 


Absolute Basophils  0.0  


 


Carbonic Acid   


 


HCO3/H2CO3 Ratio   


 


ABG pH   


 


ABG pCO2   


 


ABG pO2   


 


ABG HCO3   


 


ABG O2 Saturation   


 


ABG Base Excess   


 


FiO2   


 


Sodium   140.1 


 


Potassium   4.4 


 


Chloride   107 


 


Carbon Dioxide   21 L 


 


Anion Gap   12 


 


BUN   21 H 


 


Creatinine   0.55 


 


Est GFR ( Amer)   > 60 


 


Est GFR (Non-Af Amer)   > 60 


 


Glucose   175 H 


 


Lactic Acid    0.9


 


Calcium   9.6 


 


Total Bilirubin   0.8 


 


AST   55 H 


 


ALT   33 


 


Alkaline Phosphatase   149 H 


 


Total Protein   8.1 


 


Albumin   4.3 


 


Urine Color   


 


Urine Appearance   


 


Urine pH   


 


Ur Specific Gravity   


 


Urine Protein   


 


Urine Glucose (UA)   


 


Urine Ketones   


 


Urine Blood   


 


Urine Nitrite   


 


Ur Leukocyte Esterase   


 


Urine WBC (Auto)   


 


Urine RBC (Auto)   














  19





  05:11 05:17


 


WBC  


 


RBC  


 


Hgb  


 


Hct  


 


MCV  


 


MCH  


 


MCHC  


 


RDW  


 


Plt Count  


 


Seg Neutrophils %  


 


Lymphocytes %  


 


Monocytes %  


 


Eosinophils %  


 


Basophils %  


 


Absolute Neutrophils  


 


Absolute Lymphocytes  


 


Absolute Monocytes  


 


Absolute Eosinophils  


 


Absolute Basophils  


 


Carbonic Acid   0.95 L


 


HCO3/H2CO3 Ratio   20:1


 


ABG pH   7.40


 


ABG pCO2   31.7 L


 


ABG pO2   100.0


 


ABG HCO3   19.0 L


 


ABG O2 Saturation   97.6


 


ABG Base Excess   -4.8


 


FiO2   3.5L


 


Sodium  


 


Potassium  


 


Chloride  


 


Carbon Dioxide  


 


Anion Gap  


 


BUN  


 


Creatinine  


 


Est GFR ( Amer)  


 


Est GFR (Non-Af Amer)  


 


Glucose  


 


Lactic Acid  


 


Calcium  


 


Total Bilirubin  


 


AST  


 


ALT  


 


Alkaline Phosphatase  


 


Total Protein  


 


Albumin  


 


Urine Color  CALVIN 


 


Urine Appearance  TURBID 


 


Urine pH  7.0 


 


Ur Specific Gravity  1.018 


 


Urine Protein  >=500 H 


 


Urine Glucose (UA)  NEGATIVE 


 


Urine Ketones  20 H 


 


Urine Blood  SMALL H 


 


Urine Nitrite  POSITIVE H 


 


Ur Leukocyte Esterase  LARGE H 


 


Urine WBC (Auto)  >182 


 


Urine RBC (Auto)  14 








                                        











  19





  01:48 01:48


 


Creatine Kinase  90 


 


CK-MB (CK-2)   0.61


 


NT-Pro-B Natriuret Pep   487











Impressions: 


                                        





Chest X-Ray  19 02:01


IMPRESSION:


 


No acute cardiopulmonary disease.


 


 


copyright 2011 [employer]- All Rights Reserved


 














Assessment & Plan





- Diagnosis


(1) SIRS due to infectious process without acute organ dysfunction


Is this a current diagnosis for this admission?: Yes   


Plan: 


Continue antibiotic therapy and IV fluid resuscitation therapy.








(2) Urinary tract infection


Qualifiers: 


   Urinary tract infection type: acute cystitis   Hematuria presence: without 

hematuria   Qualified Code(s): N30.00 - Acute cystitis without hematuria   


Is this a current diagnosis for this admission?: Yes   


Plan: 


Maintain on IV fluid support with antibiotic therapy. There is concern for 

possible sepsis in view of her associated morbidities. Follow up on her blood 

and urine culture.








(3) Diabetes mellitus type 2 in nonobese


Is this a current diagnosis for this admission?: Yes   


Plan: 


Maintain on accucheck with sliding scale Humalog insulin coverage.








(4) Hypothyroidism


Qualifiers: 


   Hypothyroidism type: unspecified   Qualified Code(s): E03.9 - Hypothyroidism,

 unspecified   


Is this a current diagnosis for this admission?: Yes   


Plan: 


Continue to monitor for symptoms of decompensation related to thyroid function 

and treat ass indicated.








(5) Vitamin D deficiency


Is this a current diagnosis for this admission?: Yes   


Plan: 


Monitor level and treat and indicated.








(6) Multiple sclerosis, primary chronic progressive


Is this a current diagnosis for this admission?: Yes   


Plan: 


Continue supportive care t this time.








- Time


Time Spent: 50 to 70 Minutes


Medications reviewed and adjusted accordingly: Yes


Anticipated discharge: Home with Homehealth


Within: Other





- Inpatient Certification


Based on my medical assessment, after consideration of the patient's comor

bidities, presenting symptoms, or acuity I expect that the services needed 

warrant INPATIENT care.: Yes


I certify that my determination is in accordance with my understanding of 

Medicare's requirements for reasonable and necessary INPATIENT services [42 CFR 

412.3e].: Yes


Medical Necessity: Need Close Monitoring Due to Risk of Patient Decompensation, 

Need For IV Fluids, Need For Continuous Telemetry Monitoring, Need for IV 

Antibiotics, Risk of Complication if Not Cared For in Hospital, Risk of 

Diagnosis Which Will Require Inpatient Eval/Care/Monitoring


Post Hospital Care: D/C Planner Documentation





- Plan Summary


Plan Summary: 





See admitting attending physician orders as per above outlined care plan. I had 

a witnessed discussion with patient regarding resuscitation, at this time she 

want to be a full code status.

## 2019-01-24 NOTE — ER DOCUMENT REPORT
ED Respiratory Problem





- General


Chief Complaint: Breathing Difficulty


Stated Complaint: BREATHING PROBLEMS


Time Seen by Provider: 19 01:41


Primary Care Provider: 


MEG MCCARTHY MD [Primary Care Provider] - Follow up as needed


Mode of Arrival: Stretcher


Information source: Patient, Relative


Cannot obtain history due to: Other - Weakness


Notes: 





Patient is a 53-year-old female with a past medical history of multiple chronic 

health conditions including multiple sclerosis who presents with shortness of 

breath and weakness.  Family states the patient has been having increased work 

of breathing for the past day, has had intermittent fevers, also redness to her 

left arm.  Patient denies being in any pain.  No cough or congestion.





Onset: Yesterday


Provocation: None


Quality: Weakness, tightness


Radiation: None


Severity: Severe


Timing: Constant


TRAVEL OUTSIDE OF THE [place] IN LAST 30 DAYS: No





- HPI


Similar symptoms previously: No


Recently seen / treated by doctor: No





- Related Data


Allergies/Adverse Reactions: 


                                        





latex [Latex] Allergy (Unknown, Verified 02/25/15 17:35)


   


Sulfa (Sulfonamide Antibiotics) Allergy (Verified 02/25/15 17:35)


   











Past Medical History





- General


Information source: Patient, Relative


Cannot obtain history due to: Other - Weakness





- Social History


Smoking Status: Never Smoker


Frequency of alcohol use: None


Drug Abuse: None


Lives with: Family


Family History: None, Reviewed & Not Pertinent


Patient has suicidal ideation: No


Patient has homicidal ideation: No





- Past Medical History


Cardiac Medical History: Reports: Hx Pulmonary Embolism - 2 years ago


   Denies: Hx Atrial Fibrillation, Hx Congestive Heart Failure, Hx Heart Attack,

 Hx Hypercholesterolemia, Hx Hypertension


Pulmonary Medical History: Reports: None


   Denies: Hx Asthma, Hx Bronchitis, Hx COPD, Hx Pneumonia, Hx Respiratory 

Failure, Hx Sleep Apnea, Hx Tuberculosis


EENT Medical History: Reports: None


Neurological Medical History: Reports: None.  Denies: Hx Cerebrovascular 

Accident, Hx Migraine, Hx Seizures


Endocrine Medical History: Reports: Hx Diabetes Mellitus Type 2.  Denies: Hx 

Diabetes Mellitus Type 1


Renal/ Medical History: Reports: None.  Denies: Hx End Stage Renal Disease, Hx

 Kidney Stones, Hx Peritoneal Dialysis


Malignancy Medical History: Reports: None.  Denies: Hx Leukemia, Hx Lung Cancer


GI Medical History: Reports: None.  Denies: Hx Gastroesophageal Reflux Disease, 

Hx Hiatal Hernia, Hx Ulcer


Musculoskeletal Medical History: Denies Hx Arthritis, Reports Hx Multiple 

Sclerosis


Skin Medical History: Reports None


Psychiatric Medical History: Reports: None


   Denies: Hx Attention Deficit Hyperactivity Disorder, Hx Bipolar Disorder, Hx 

Dementia, Hx Depression, Hx Schizophrenia


Traumatic Medical History: Reports: None


Infectious Medical History: Reports: None.  Denies: Hx HIV


Past Surgical History: Reports: Hx  Section - x2, Hx Cholecystectomy.  

Denies: Hx Appendectomy, Hx Bowel Surgery, Hx Coronary Artery Bypass Graft, Hx 

Gastric Bypass Surgery, Hx Herniorrhaphy, Hx Hysterectomy, Hx Mastectomy, Hx 

Pacemaker, Hx Tonsillectomy, Hx Tubal Ligation





- Immunizations


Immunizations up to date: Yes


Hx Diphtheria, Pertussis, Tetanus Vaccination: Yes


History of Influenza Vaccine for 10/2017 - 3/2018 Season: Unknown





Review of Systems





- Review of Systems


Notes: 





REVIEW OF SYSTEMS:





CONSTITUTIONAL : Positive fever,  chills, and sweats.  Denies recent illness.


EENT:   Denies eye, ear, throat, or mouth pain or symptoms.  Denies nasal or 

sinus congestion.


CARDIOVASCULAR:  Denies chest pain.


RESPIRATORY:  Denies cough, cold, or chest congestion.  Denies shortness of 

breath, difficulty breathing, or wheezing.


GASTROINTESTINAL:  Denies abdominal pain.  Denies nausea, vomiting, or diarrhea.

  Denies constipation.


GENITOURINARY:  Denies difficulty urinating, painful urination, burning, 

frequency, or blood in urine.


FEMALE  GENITOURINARY:  Denies vaginal bleeding, abnormal or irregular periods.


MUSCULOSKELETAL:  Denies neck or back pain or joint pain or swelling.  Positive 

redness to the left arm.


SKIN:   Denies rash or skin lesions.


HEMATOLOGIC :   Denies easy bruising or bleeding.


LYMPHATIC:  Denies swollen, enlarged glands.


NEUROLOGICAL:  Denies altered mental status or loss of consciousness.  Denies 

headache.  Denies weakness or paralysis or loss of use of either side.  Denies 

problems with gait or speech.  Denies sensory or motor loss.


PSYCHIATRIC:  Denies anxiety or stress or depression.


ALL OTHER SYSTEMS REVIEWED AND NEGATIVE.





Physical Exam





- Vital signs


Vitals: 


                                        











Resp BP Pulse Ox


 


 28 H  121/80   96 


 


 19 01:31  19 01:31  19 01:31














- Notes


Notes: 





PHYSICAL EXAMINATION:





GENERAL: Sick appearing in moderate to severe distress, diaphoretic, 

tachycardic, weak appearing.





HEAD: Atraumatic, normocephalic.





EYES: Pupils equal round and reactive to light, extraocular movements intact, 

sclera anicteric, conjunctiva are normal.





ENT: nares patent, oropharynx clear without exudates.  Moist mucous membranes.





NECK: Normal range of motion, supple without lymphadenopathy





LUNGS: Breath sounds diminished bilaterally with expiratory crackles and 

rhonchi, no wheezing.  





HEART: Tachycardia without murmurs, mildly delayed capillary refill. 





ABDOMEN: Soft, nontender, normoactive bowel sounds.  No guarding, no rebound.  

No masses appreciated.





EXTREMITIES: Normal range of motion, no pitting or edema.  No cyanosis.





NEUROLOGICAL: No focal neurological deficits. Moves all extremities 

spontaneously and on command.





PSYCH: Normal mood, normal affect.





SKIN: Warm, Dry, normal turgor, no rashes or lesions noted.  Mild redness to the

 left upper and lower parts of the arm.





Course





- Re-evaluation


Re-evalutation: 





19 05:46


Labs are consistent with likely sepsis due to a urinary tract infection.  

Patient is admitted to the hospital.





- Vital Signs


Vital signs: 


                                        











Temp Pulse Resp BP Pulse Ox


 


       27 H  109/77   96 


 


       19 03:30  19 03:30  19 03:30














- Laboratory


Result Diagrams: 


                                 19 01:48





                                 19 01:48


Laboratory results interpreted by me: 


                                        











  19





  01:48 01:48 05:11


 


MCH  26.7 L  


 


RDW  14.2 H  


 


Seg Neutrophils %  88.8 H  


 


Lymphocytes %  7.1 L  


 


Carbonic Acid   


 


ABG pCO2   


 


ABG HCO3   


 


ABG Total CO2   


 


Carbon Dioxide   21 L 


 


BUN   21 H 


 


Glucose   175 H 


 


Direct Bilirubin   0.6 H 


 


AST   55 H 


 


Alkaline Phosphatase   149 H 


 


Urine Protein    >=500 H


 


Urine Ketones    20 H


 


Urine Blood    SMALL H


 


Urine Nitrite    POSITIVE H


 


Urine Urobilinogen    2.0 H


 


Ur Leukocyte Esterase    LARGE H














  19





  05:17


 


MCH 


 


RDW 


 


Seg Neutrophils % 


 


Lymphocytes % 


 


Carbonic Acid  0.95 L


 


ABG pCO2  31.7 L


 


ABG HCO3  19.0 L


 


ABG Total CO2  19.9 L


 


Carbon Dioxide 


 


BUN 


 


Glucose 


 


Direct Bilirubin 


 


AST 


 


Alkaline Phosphatase 


 


Urine Protein 


 


Urine Ketones 


 


Urine Blood 


 


Urine Nitrite 


 


Urine Urobilinogen 


 


Ur Leukocyte Esterase 














- Diagnostic Test


Radiology reviewed: Image reviewed, Reports reviewed





- EKG Interpretation by Me


EKG shows normal: Sinus rhythm


Rate: Tachycardia


Rhythm: NSR


Axis/QRS: No: Right axis deviation, Left axis deviation, RBBB, LBBB, IVCD, 

LAHB/LAFB, LPHB/LPFB, Bifasicular block


Voltage: No: Increased voltage, Consistant with LVH, Decreased voltage, 

Throughout, Limb leads


P Waves: No: JESSY, LAE, Absent, AV Dissociation, Other


When compared to previous EKG there are: No significant change





- Consults


  ** Dr. Mccarthy


Time consulted: 05:45 - will admit


Consulted provider: will come to ER





Discharge





- Discharge


Clinical Impression: 


Sepsis


Qualifiers:


 Sepsis type: sepsis due to unspecified organism Qualified Code(s): A41.9 - 

Sepsis, unspecified organism





Urinary tract infection


Qualifiers:


 Urinary tract infection type: acute cystitis Hematuria presence: without 

hematuria Qualified Code(s): N30.00 - Acute cystitis without hematuria





Condition: Stable


Disposition: ADMITTED AS INPATIENT


Admitting Provider: Aura


Unit Admitted: IMCU


Referrals: 


MEG MCCARTHY MD [Primary Care Provider] - Follow up as needed

## 2019-01-25 LAB
ABSOLUTE LYMPHOCYTES# (MANUAL): 1.3 10^3/UL (ref 0.5–4.7)
ABSOLUTE MONOCYTES # (MANUAL): 0.2 10^3/UL (ref 0.1–1.4)
ABSOLUTE NEUTROPHILS# (MANUAL): 7.2 10^3/UL (ref 1.7–8.2)
ADD MANUAL DIFF: YES
ANION GAP SERPL CALC-SCNC: 3 MMOL/L (ref 5–19)
BASOPHILS NFR BLD MANUAL: 0 % (ref 0–2)
BUN SERPL-MCNC: 21 MG/DL (ref 7–20)
BURR CELLS BLD QL SMEAR: SLIGHT
CALCIUM: 8.8 MG/DL (ref 8.4–10.2)
CHLORIDE SERPL-SCNC: 111 MMOL/L (ref 98–107)
CO2 SERPL-SCNC: 23 MMOL/L (ref 22–30)
EOSINOPHIL NFR BLD MANUAL: 0 % (ref 0–6)
ERYTHROCYTE [DISTWIDTH] IN BLOOD BY AUTOMATED COUNT: 14.4 % (ref 11.5–14)
GLUCOSE SERPL-MCNC: 105 MG/DL (ref 75–110)
HCT VFR BLD CALC: 35 % (ref 36–47)
HGB BLD-MCNC: 11.7 G/DL (ref 12–15.5)
MCH RBC QN AUTO: 27 PG (ref 27–33.4)
MCHC RBC AUTO-ENTMCNC: 33.6 G/DL (ref 32–36)
MCV RBC AUTO: 80 FL (ref 80–97)
METAMYELOCYTES % (MANUAL): 1 %
MONOCYTES % (MANUAL): 2 % (ref 3–13)
NEUTS BAND NFR BLD MANUAL: 6 % (ref 3–5)
PLATELET # BLD: 162 10^3/UL (ref 150–450)
PLATELET COMMENT: ADEQUATE
POIKILOCYTOSIS BLD QL SMEAR: SLIGHT
POTASSIUM SERPL-SCNC: 4.6 MMOL/L (ref 3.6–5)
RBC # BLD AUTO: 4.35 10^6/UL (ref 3.72–5.28)
SEGMENTED NEUTROPHILS % (MAN): 76 % (ref 42–78)
SODIUM SERPL-SCNC: 137.2 MMOL/L (ref 137–145)
TOTAL CELLS COUNTED BLD: 100
TOXIC GRANULES BLD QL SMEAR: SLIGHT
VANCOMYCIN,TROUGH: 30.6 UG/ML (ref 5–20)
VARIANT LYMPHS NFR BLD MANUAL: 15 % (ref 13–45)
WBC # BLD AUTO: 8.7 10^3/UL (ref 4–10.5)

## 2019-01-25 RX ADMIN — LEVOFLOXACIN SCH MLS/HR: 500 INJECTION, SOLUTION INTRAVENOUS at 11:45

## 2019-01-25 RX ADMIN — SODIUM CHLORIDE PRN MLS/HR: 9 INJECTION, SOLUTION INTRAVENOUS at 05:56

## 2019-01-25 RX ADMIN — VANCOMYCIN HYDROCHLORIDE SCH MLS/HR: 1 INJECTION, POWDER, LYOPHILIZED, FOR SOLUTION INTRAVENOUS at 09:28

## 2019-01-25 RX ADMIN — VANCOMYCIN HYDROCHLORIDE SCH MLS/HR: 1 INJECTION, POWDER, LYOPHILIZED, FOR SOLUTION INTRAVENOUS at 18:09

## 2019-01-25 RX ADMIN — ENOXAPARIN SODIUM SCH MG: 40 INJECTION SUBCUTANEOUS at 09:28

## 2019-01-25 RX ADMIN — SODIUM CHLORIDE PRN MLS/HR: 9 INJECTION, SOLUTION INTRAVENOUS at 15:05

## 2019-01-25 RX ADMIN — VANCOMYCIN HYDROCHLORIDE SCH MLS/HR: 1 INJECTION, POWDER, LYOPHILIZED, FOR SOLUTION INTRAVENOUS at 02:43

## 2019-01-25 RX ADMIN — CEFEPIME HYDROCHLORIDE SCH MLS/HR: 2 INJECTION, SOLUTION INTRAVENOUS at 03:02

## 2019-01-25 RX ADMIN — CEFEPIME HYDROCHLORIDE SCH MLS/HR: 2 INJECTION, SOLUTION INTRAVENOUS at 14:59

## 2019-01-25 RX ADMIN — LANSOPRAZOLE SCH MG: 30 TABLET, ORALLY DISINTEGRATING, DELAYED RELEASE ORAL at 05:23

## 2019-01-25 NOTE — PDOC PROGRESS REPORT
Subjective


Progress Note for:: 01/25/19


Subjective:: 





Patient denied any fever or chills. No abdominal pain, nausea or vomiting. 

Tolerating oral feeding. No chest pain or difficulty with breathing.


Reason For Visit: 


SEPSIS,POSSIBLE UTI








Physical Exam


Vital Signs: 


                                        











Temp Pulse Resp BP Pulse Ox


 


 98.4 F   73   18   104/55 L  97 


 


 01/25/19 11:14  01/25/19 14:00  01/25/19 07:22  01/25/19 11:14  01/25/19 11:14








                                 Intake & Output











 01/24/19 01/25/19 01/26/19





 06:59 06:59 06:59


 


Intake Total 1000 2800 1415


 


Output Total  725 150


 


Balance 1000 2075 1265


 


Weight 83.4 kg 103 kg 











General appearance: PRESENT: no acute distress, obese


Head exam: PRESENT: atraumatic, normocephalic


Eye exam: PRESENT: conjunctiva pink, EOMI, PERRLA.  ABSENT: scleral icterus


Ear exam: PRESENT: normal external ear exam


Mouth exam: PRESENT: moist


Respiratory exam: PRESENT: clear to auscultation elver, decreased breath sounds - 

at lung bases


Cardiovascular exam: PRESENT: RRR.  ABSENT: diastolic murmur, rubs, systolic 

murmur


Vascular exam: PRESENT: normal capillary refill.  ABSENT: pallor


GI/Abdominal exam: PRESENT: normal bowel sounds, soft.  ABSENT: distended, 

guarding, mass, organolmegaly, rebound, tenderness


Gentrourinary exam: PRESENT: indwelling catheter


Extremities exam: PRESENT: other - brace in use for upper extremity.  ABSENT: 

pedal edema


Musculoskeletal exam: PRESENT: deformity - related to spastic componenet of her 

multiple sclerosis


Neurological exam: PRESENT: alert, awake, oriented to person, oriented to place,

oriented to time, oriented to situation, CN II-XII grossly intact.  ABSENT: m

otor sensory deficit


Psychiatric exam: PRESENT: appropriate affect, normal mood.  ABSENT: homicidal 

ideation, suicidal ideation


Skin exam: PRESENT: dry, rash - in beneath breast tissue and groin regions, warm





Results


Laboratory Results: 


                                        





                                 01/25/19 05:46 





                                 01/25/19 05:46 





                                        











  01/25/19 01/25/19





  05:46 05:46


 


WBC  8.7 


 


RBC  4.35 


 


Hgb  11.7 L D 


 


Hct  35.0 L 


 


MCV  80 


 


MCH  27.0 


 


MCHC  33.6 


 


RDW  14.4 H 


 


Plt Count  162 


 


Seg Neutrophils %  Not Reportable 


 


Lymphocytes %  Not Reportable 


 


Monocytes %  Not Reportable 


 


Eosinophils %  Not Reportable 


 


Basophils %  Not Reportable 


 


Absolute Neutrophils  Not Reportable 


 


Absolute Lymphocytes  Not Reportable 


 


Absolute Monocytes  Not Reportable 


 


Absolute Eosinophils  Not Reportable 


 


Absolute Basophils  Not Reportable 


 


Sodium   137.2


 


Potassium   4.6


 


Chloride   111 H


 


Carbon Dioxide   23


 


Anion Gap   3 L


 


BUN   21 H


 


Creatinine   0.50 L


 


Est GFR ( Amer)   > 60


 


Est GFR (Non-Af Amer)   > 60


 


Glucose   105


 


Calcium   8.8








                                        











  01/24/19 01/24/19





  01:48 01:48


 


Creatine Kinase  90 


 


CK-MB (CK-2)   0.61


 


NT-Pro-B Natriuret Pep   487











Impressions: 


                                        





Chest X-Ray  01/24/19 02:01


IMPRESSION:


 


No acute cardiopulmonary disease.


 


 


copyright 2011 [employer]- All Rights Reserved


 














Assessment & Plan





- Diagnosis


(1) SIRS due to infectious process without acute organ dysfunction


Is this a current diagnosis for this admission?: Yes   





(2) Urinary tract infection


Qualifiers: 


   Urinary tract infection type: acute cystitis   Hematuria presence: without 

hematuria   Qualified Code(s): N30.00 - Acute cystitis without hematuria   


Is this a current diagnosis for this admission?: Yes   





(3) Diabetes mellitus type 2 in nonobese


Is this a current diagnosis for this admission?: Yes   





(4) Hypothyroidism


Qualifiers: 


   Hypothyroidism type: unspecified   Qualified Code(s): E03.9 - Hypothyroidism,

unspecified   


Is this a current diagnosis for this admission?: Yes   





(5) Vitamin D deficiency


Is this a current diagnosis for this admission?: Yes   





(6) Multiple sclerosis, primary chronic progressive


Is this a current diagnosis for this admission?: Yes   





- Time


Time Spent with patient: 25-34 minutes


Medications reviewed and adjusted accordingly: Yes


Anticipated discharge: Home with Homehealth


Within: Other





- Inpatient Certification


Based on my medical assessment, after consideration of the patient's 

comorbidities, presenting symptoms, or acuity I expect that the services needed 

warrant INPATIENT care.: Yes


I certify that my determination is in accordance with my understanding of 

Medicare's requirements for reasonable and necessary INPATIENT services [42 CFR 

412.3e].: Yes


Medical Necessity: Need Close Monitoring Due to Risk of Patient Decompensation, 

Need For IV Fluids, Need For Continuous Telemetry Monitoring, Need for IV Anti

biotics, Risk of Complication if Not Cared For in Hospital


Post Hospital Care: D/C Planner Documentation





- Plan Summary


Plan Summary: 





Continue triple antibiotic coverage pending her urine and blood culture final 

report findings. Her tachycardia is improving. Maintain on IV fluid support.

## 2019-01-26 RX ADMIN — SODIUM CHLORIDE PRN MLS/HR: 9 INJECTION, SOLUTION INTRAVENOUS at 23:39

## 2019-01-26 RX ADMIN — CEFEPIME HYDROCHLORIDE SCH: 2 INJECTION, SOLUTION INTRAVENOUS at 16:19

## 2019-01-26 RX ADMIN — DEXTROSE PRN GM: 15 GEL ORAL at 07:06

## 2019-01-26 RX ADMIN — CEFEPIME HYDROCHLORIDE SCH MLS/HR: 2 INJECTION, SOLUTION INTRAVENOUS at 02:16

## 2019-01-26 RX ADMIN — CEFEPIME HYDROCHLORIDE SCH MLS/HR: 2 INJECTION, SOLUTION INTRAVENOUS at 16:18

## 2019-01-26 RX ADMIN — ENOXAPARIN SODIUM SCH: 40 INJECTION SUBCUTANEOUS at 09:00

## 2019-01-26 RX ADMIN — LANSOPRAZOLE SCH MG: 30 TABLET, ORALLY DISINTEGRATING, DELAYED RELEASE ORAL at 05:02

## 2019-01-26 RX ADMIN — SODIUM CHLORIDE PRN MLS/HR: 9 INJECTION, SOLUTION INTRAVENOUS at 12:17

## 2019-01-26 RX ADMIN — SODIUM CHLORIDE PRN MLS/HR: 9 INJECTION, SOLUTION INTRAVENOUS at 00:11

## 2019-01-26 RX ADMIN — LEVOFLOXACIN SCH MLS/HR: 500 INJECTION, SOLUTION INTRAVENOUS at 12:15

## 2019-01-26 NOTE — PDOC PROGRESS REPORT
Subjective


Progress Note for:: 01/26/19


Subjective:: 





Patient with advanced MS seen by the bedside, she has polymicrobial UTI, the 

pathogens sensitive to Levaquin, we will de-escalate antibiotic, DC vancomycin, 

DC cefepime


Reason For Visit: 


SEPSIS,POSSIBLE UTI








Physical Exam


Vital Signs: 


                                        











Temp Pulse Resp BP Pulse Ox


 


 98.7 F   107 H  18   127/71 H  98 


 


 01/26/19 15:02  01/26/19 15:02  01/26/19 15:02  01/26/19 15:02  01/26/19 15:02








                                 Intake & Output











 01/25/19 01/26/19 01/27/19





 06:59 06:59 06:59


 


Intake Total 2800 2725 1218


 


Output Total 725 1225 250


 


Balance 2075 1500 968


 


Weight 103 kg 104 kg 104 kg











General appearance: PRESENT: no acute distress


Eye exam: PRESENT: PERRLA


Respiratory exam: PRESENT: clear to auscultation elver


Cardiovascular exam: PRESENT: +S1, +S2


GI/Abdominal exam: PRESENT: soft


Neurological exam: PRESENT: alert





Results


Laboratory Results: 


                                        





                                 01/25/19 05:46 





                                 01/25/19 17:50 





                                        











  01/25/19





  17:50


 


Creatinine  0.45 L


 


Est GFR ( Amer)  > 60


 


Est GFR (Non-Af Amer)  > 60








                                        





01/24/19 05:11   Catheterized Urine   Urine Culture - Final


                            Klebsiella Pneumoniae


                            Providencia Stuartii


                            Group B Beta Streptococcus





                                        











  01/24/19 01/24/19





  01:48 01:48


 


Creatine Kinase  90 


 


CK-MB (CK-2)   0.61


 


NT-Pro-B Natriuret Pep   487











Impressions: 


                                        





Chest X-Ray  01/24/19 02:01


IMPRESSION:


 


No acute cardiopulmonary disease.


 


 


copyright 2011 [employer]- All Rights Reserved


 














Assessment & Plan





- Diagnosis


(1) Sepsis


Qualifiers: 


   Sepsis type: sepsis due to unspecified organism   Qualified Code(s): A41.9 - 

Sepsis, unspecified organism   


Is this a current diagnosis for this admission?: Yes   


Plan: 


De-escalate antibiotic, continue Levaquin








(2) Urinary tract infection


Qualifiers: 


   Urinary tract infection type: acute cystitis   Hematuria presence: without 

hematuria   Qualified Code(s): N30.00 - Acute cystitis without hematuria   


Is this a current diagnosis for this admission?: Yes   





(3) Multiple sclerosis


Is this a current diagnosis for this admission?: Yes   





(4) Diabetes mellitus type 2 in obese


Is this a current diagnosis for this admission?: Yes

## 2019-01-27 RX ADMIN — LEVOFLOXACIN SCH MLS/HR: 500 INJECTION, SOLUTION INTRAVENOUS at 13:09

## 2019-01-27 RX ADMIN — ACETAMINOPHEN PRN MG: 325 TABLET ORAL at 13:09

## 2019-01-27 RX ADMIN — LANSOPRAZOLE SCH MG: 30 TABLET, ORALLY DISINTEGRATING, DELAYED RELEASE ORAL at 05:10

## 2019-01-27 RX ADMIN — ENOXAPARIN SODIUM SCH MG: 40 INJECTION SUBCUTANEOUS at 09:34

## 2019-01-27 RX ADMIN — SODIUM CHLORIDE PRN MLS/HR: 9 INJECTION, SOLUTION INTRAVENOUS at 09:31

## 2019-01-27 RX ADMIN — SODIUM CHLORIDE PRN MLS/HR: 9 INJECTION, SOLUTION INTRAVENOUS at 20:42

## 2019-01-27 NOTE — PDOC PROGRESS REPORT
Subjective


Progress Note for:: 01/27/19


Subjective:: 





Patient seen by the bedside there is no new complaints


Reason For Visit: 


SEPSIS,POSSIBLE UTI








Physical Exam


Vital Signs: 


                                        











Temp Pulse Resp BP Pulse Ox


 


 97.8 F   93   18   122/69   100 


 


 01/27/19 15:29  01/27/19 15:29  01/27/19 15:29  01/27/19 15:29  01/27/19 15:29








                                 Intake & Output











 01/26/19 01/27/19 01/28/19





 06:59 06:59 06:59


 


Intake Total 2725 2338 1087


 


Output Total 1225 1525 200


 


Balance 1500 813 887


 


Weight 104 kg 103.1 kg 











General appearance: PRESENT: no acute distress


Eye exam: PRESENT: PERRLA


Respiratory exam: PRESENT: clear to auscultation elver


Cardiovascular exam: PRESENT: +S1, +S2


GI/Abdominal exam: PRESENT: soft





Results


Laboratory Results: 


                                        





                                 01/25/19 05:46 





                                 01/25/19 17:50 





                                        











  01/24/19 01/24/19





  01:48 01:48


 


Creatine Kinase  90 


 


CK-MB (CK-2)   0.61


 


NT-Pro-B Natriuret Pep   487











Impressions: 


                                        





Chest X-Ray  01/24/19 02:01


IMPRESSION:


 


No acute cardiopulmonary disease.


 


 


copyright 2011 [employer]- All Rights Reserved


 














Assessment & Plan





- Diagnosis


(1) Sepsis


Qualifiers: 


   Sepsis type: sepsis due to unspecified organism   Qualified Code(s): A41.9 - 

Sepsis, unspecified organism   


Is this a current diagnosis for this admission?: Yes   


Plan: 


De-escalate antibiotic, continue Levaquin








(2) Urinary tract infection


Qualifiers: 


   Urinary tract infection type: acute cystitis   Hematuria presence: without 

hematuria   Qualified Code(s): N30.00 - Acute cystitis without hematuria   


Is this a current diagnosis for this admission?: Yes   





(3) Multiple sclerosis


Is this a current diagnosis for this admission?: Yes   





(4) Diabetes mellitus type 2 in obese


Is this a current diagnosis for this admission?: Yes

## 2019-01-28 RX ADMIN — ENOXAPARIN SODIUM SCH MG: 40 INJECTION SUBCUTANEOUS at 09:57

## 2019-01-28 RX ADMIN — LANSOPRAZOLE SCH MG: 30 TABLET, ORALLY DISINTEGRATING, DELAYED RELEASE ORAL at 06:05

## 2019-01-28 RX ADMIN — ACETAMINOPHEN PRN MG: 325 TABLET ORAL at 15:57

## 2019-01-28 RX ADMIN — SODIUM CHLORIDE PRN MLS/HR: 9 INJECTION, SOLUTION INTRAVENOUS at 15:58

## 2019-01-28 RX ADMIN — SODIUM CHLORIDE PRN MLS/HR: 9 INJECTION, SOLUTION INTRAVENOUS at 06:05

## 2019-01-28 RX ADMIN — LEVOFLOXACIN SCH MG: 500 TABLET, FILM COATED ORAL at 09:57

## 2019-01-28 NOTE — PDOC PROGRESS REPORT
Subjective


Progress Note for:: 01/28/19


Subjective:: 





Patient denied any fever or chills. No abdominal pain, nausea or vomiting. 

Tolerating oral feeding. No chest pain or difficulty with breathing. Remain on 

IV Levofloxacin coverage.


Reason For Visit: 


SEPSIS,POSSIBLE UTI








Physical Exam


Vital Signs: 


                                        











Temp Pulse Resp BP Pulse Ox


 


 97.9 F   81   20   135/72 H  99 


 


 01/28/19 05:25  01/28/19 05:25  01/28/19 05:25  01/28/19 05:25  01/28/19 05:25








                                 Intake & Output











 01/27/19 01/28/19 01/29/19





 06:59 06:59 06:59


 


Intake Total 2338 3705 


 


Output Total 1525 2000 


 


Balance 813 1705 


 


Weight 103.1 kg 108.4 kg 











Physical Exam: 


General appearance: PRESENT: no acute distress, obese


Head exam: PRESENT: atraumatic, normocephalic


Eye exam: PRESENT: conjunctiva pink, EOMI, PERRLA.  ABSENT: pallor, scleral 

icterus


Ear exam: PRESENT: normal external ear exam


Mouth exam: PRESENT: moist


Respiratory exam: PRESENT: clear to auscultation elver, decreased breath sounds - 

at lung bases


Cardiovascular exam: PRESENT: RRR.  ABSENT: diastolic murmur, rubs, systolic 

murmur


GI/Abdominal exam: PRESENT: normal bowel sounds, soft.  ABSENT: distended, 

guarding, mass, organomegaly, rebound, tenderness


Genitourinary exam: PRESENT: indwelling catheter


Extremities exam: PRESENT: other - brace in use for upper extremity.  ABSENT: 

pedal edema


Musculoskeletal exam: PRESENT: deformity - related to spastic component of her 

multiple sclerosis


Neurological exam: PRESENT: alert, awake, oriented to person, oriented to place,

oriented to time, oriented to situation, CN II-XII grossly intact.  ABSENT: 

motor sensory deficit


Psychiatric exam: PRESENT: appropriate affect, normal mood.  ABSENT: homicidal 

ideation, suicidal ideation


Skin exam: PRESENT: dry, rash - in beneath breast tissue and groin regions, warm








Results


Laboratory Results: 


                                        





                                 01/25/19 05:46 





                                 01/25/19 17:50 





                                        











  01/24/19 01/24/19





  01:48 01:48


 


Creatine Kinase  90 


 


CK-MB (CK-2)   0.61


 


NT-Pro-B Natriuret Pep   487











Impressions: 


                                        





Chest X-Ray  01/24/19 02:01


IMPRESSION:


 


No acute cardiopulmonary disease.


 


 


copyright 2011 [employer]- All Rights Reserved


 














Assessment & Plan





- Diagnosis


(1) SIRS due to infectious process without acute organ dysfunction


Is this a current diagnosis for this admission?: Yes   





(2) Urinary tract infection


Qualifiers: 


   Urinary tract infection type: acute cystitis   Hematuria presence: without 

hematuria   Qualified Code(s): N30.00 - Acute cystitis without hematuria   


Is this a current diagnosis for this admission?: Yes   





(3) Diabetes mellitus type 2 in nonobese


Is this a current diagnosis for this admission?: Yes   





(4) Hypothyroidism


Qualifiers: 


   Hypothyroidism type: unspecified   Qualified Code(s): E03.9 - Hypothyroidism,

unspecified   


Is this a current diagnosis for this admission?: Yes   





(5) Vitamin D deficiency


Is this a current diagnosis for this admission?: Yes   





(6) Multiple sclerosis, primary chronic progressive


Is this a current diagnosis for this admission?: Yes   





- Time


Time Spent with patient: 25-34 minutes


Medications reviewed and adjusted accordingly: Yes


Anticipated discharge: Home with Homehealth


Within: Other





- Inpatient Certification


Based on my medical assessment, after consideration of the patient's 

comorbidities, presenting symptoms, or acuity I expect that the services needed 

warrant INPATIENT care.: Yes


I certify that my determination is in accordance with my understanding of 

Medicare's requirements for reasonable and necessary INPATIENT services [42 CFR 

412.3e].: Yes


Medical Necessity: Need Close Monitoring Due to Risk of Patient Decompensation, 

Need For IV Fluids, Need For Continuous Telemetry Monitoring, Need for IV 

Antibiotics, Risk of Complication if Not Cared For in Hospital


Post Hospital Care: D/C Planner Documentation





- Plan Summary


Plan Summary: 





D/C IV Levofloxacin. Start on Levofloxacin 500mg p.o daily. Maintain on all 

other current medication management.

## 2019-01-29 VITALS — SYSTOLIC BLOOD PRESSURE: 135 MMHG | DIASTOLIC BLOOD PRESSURE: 72 MMHG

## 2019-01-29 RX ADMIN — ENOXAPARIN SODIUM SCH: 40 INJECTION SUBCUTANEOUS at 09:09

## 2019-01-29 RX ADMIN — SODIUM CHLORIDE PRN MLS/HR: 9 INJECTION, SOLUTION INTRAVENOUS at 02:08

## 2019-01-29 RX ADMIN — LEVOFLOXACIN SCH MG: 500 TABLET, FILM COATED ORAL at 09:07

## 2019-01-29 RX ADMIN — LANSOPRAZOLE SCH MG: 30 TABLET, ORALLY DISINTEGRATING, DELAYED RELEASE ORAL at 05:20

## 2019-01-29 RX ADMIN — ACETAMINOPHEN PRN MG: 325 TABLET ORAL at 09:07

## 2019-01-29 RX ADMIN — DEXTROSE PRN GM: 15 GEL ORAL at 11:26

## 2019-01-29 NOTE — PDOC DISCHARGE SUMMARY
General





- Admit/Disc Date/PCP


Admission Date/Primary Care Provider: 


  01/24/19 05:52





  MEG MCCARTHY





Discharge Date: 01/29/19





- Discharge Diagnosis


(1) SIRS due to infectious process without acute organ dysfunction


Is this a current diagnosis for this admission?: Yes   





(2) Urinary tract infection


Is this a current diagnosis for this admission?: Yes   





(3) Diabetes mellitus type 2 in nonobese


Is this a current diagnosis for this admission?: Yes   





(4) Hypothyroidism


Is this a current diagnosis for this admission?: Yes   





(5) Vitamin D deficiency


Is this a current diagnosis for this admission?: Yes   





(6) Multiple sclerosis, primary chronic progressive


Is this a current diagnosis for this admission?: Yes   





- Additional Information


Resuscitation Status: Full Code


Discharge Diet: As Tolerated


Discharge Activity: Activity As Tolerated


Prescriptions: 


Levofloxacin [Levaquin 500 mg Tablet] 500 mg PO DAILY #5 tablet


Nystatin [Mycostatin Topical Powder 15 gm] 1 applic TP TIDP PRN #1 bottle


 PRN Reason: 


Home Medications: 








Acetaminophen [Tylenol Extra Strength 500 mg Tablet] 1,000 mg PO Q6 01/24/19 


Baclofen [Baclofen 10 mg Tablet] 10 mg PO TID 01/24/19 


Modafinil [Provigil] 200 mg PO DAILY 01/24/19 


Levofloxacin [Levaquin 500 mg Tablet] 500 mg PO DAILY #5 tablet 01/29/19 


Nystatin [Mycostatin Topical Powder 15 gm] 1 applic TP TIDP PRN #1 bottle 

01/29/19 











History of Present Illness


Patient complains of: Worsening difficulty with breathing, Generalized weakness


History of Present Illness: 


VI PAIGE is a 53 year old female known to my practice but not compliant

with follow up in the office presented to the ED with family complaining about 

worsening difficulty with breathing, generalized weakness and at the time of my 

evaluation associated malodor to her urine for couple of days. She reported 

episodes of fever, chills, nausea, poor appetite and oral intake. She reported 

chest and sinus congestion, associated  coughing with expectoration difficulty. 

No headache or facial pain. She denied any flank pain or definite pain with 

urination. She denied any blood in her urine. Her initial evaluation in the ED 

was significant for tachycardia, tachypnea, minimal verbal response and de

monstrated alteration in her mental alertness. There was concern for possible 

sepsis due to associated low blood pressure and fever. Her laboratory evaluation

with catheterized urine was suggestive of urinary tract infection with elevated 

BUN. Her morbidities include Multiple sclerosis with fatigue and malaise, 

Diabetes Mellitus type 2, Hypothyroidism, vitamin D deficiency, chronic 

constipation, Anemia, Allergic rhinitis, and morbid obesity. She was advised 

hospitalization for further evaluation and management.





Hospital Course


Hospital Course: 


She was admitted to [facility] and managed with fluid resuscitation and IV antibiotic 

therapy including Vancomycin, Cefepime and Levofloxacin in view of her 

presentation and concern for sepsis with septic shock. Her blood culture was no 

growth after 5 days. Her urine culture did grew significantly Klebsiella Pneum

oniae and Providencia Stuartii both sensitive to Levofloxacin. Her other 

antibiotic coverage were discontinued. She was eventually transition to oral 

Levofloxacin. She remain afebrile and her clinical vitals stable in last 24 

hours. She will be discharged home today with home health services. She will 

follow up in the office as instructed upon discharge.





Physical Exam


Vital Signs: 


                                        











Temp Pulse Resp BP Pulse Ox


 


 97.2 F   79   20   132/76 H  94 


 


 01/29/19 02:52  01/29/19 07:00  01/29/19 02:52  01/29/19 02:52  01/29/19 02:52








                                 Intake & Output











 01/28/19 01/29/19 01/30/19





 06:59 06:59 06:59


 


Intake Total 3705 2224 


 


Output Total 2000 1550 


 


Balance 1705 674 


 


Weight 108.4 kg 106.9 kg 











Physical Exam: 


General appearance: PRESENT: no acute distress, obese


Head exam: PRESENT: atraumatic, normocephalic


Eye exam: PRESENT: conjunctiva pink, EOMI, PERRLA.  ABSENT: pallor, scleral 

icterus


Ear exam: PRESENT: normal external ear exam


Mouth exam: PRESENT: moist


Respiratory exam: PRESENT: clear to auscultation elver, decreased breath sounds - 

at lung bases


Cardiovascular exam: PRESENT: RRR.  ABSENT: diastolic murmur, rubs, systolic 

murmur


GI/Abdominal exam: PRESENT: normal bowel sounds, soft.  ABSENT: distended, 

guarding, mass, organomegaly, rebound, tenderness


Genitourinary exam: PRESENT: indwelling catheter


Extremities exam: PRESENT: other - brace in use for upper extremity.  ABSENT: 

pedal edema


Musculoskeletal exam: PRESENT: deformity - related to spastic component of her 

multiple sclerosis


Neurological exam: PRESENT: alert, awake, oriented to person, oriented to place,

oriented to time, oriented to situation, CN II-XII grossly intact.  ABSENT: 

motor sensory deficit


Psychiatric exam: PRESENT: appropriate affect, normal mood.  ABSENT: homicidal 

ideation, suicidal ideation


Skin exam: PRESENT: dry, warm, improving rash - beneath breast tissue and groin 

regions.





Results


Laboratory Results: 


                                        





                                 01/25/19 05:46 





                                 01/25/19 17:50 





                                        





01/24/19 02:44   Blood   Blood Culture - Final


                            NO GROWTH IN 5 DAYS


01/24/19 01:48   Blood   Blood Culture - Final


                            NO GROWTH IN 5 DAYS





                                        











  01/24/19 01/24/19





  01:48 01:48


 


Creatine Kinase  90 


 


CK-MB (CK-2)   0.61


 


NT-Pro-B Natriuret Pep   487











Impressions: 


                                        





Chest X-Ray  01/24/19 02:01


IMPRESSION:


 


No acute cardiopulmonary disease.


 


 


copyright 2011 [employer]- All Rights Reserved


 














Qualifiers





- *


PATIENT BEING DISCHARGED WITH ANY OF THE FOLLOWING DIAGNOSIS: No





Plan


Discharge Plan: 





She will be discharged home today with home health services and follow up in the

office as instructed.

## 2019-10-27 ENCOUNTER — HOSPITAL ENCOUNTER (INPATIENT)
Dept: HOSPITAL 62 - ER | Age: 54
LOS: 7 days | Discharge: HOME HEALTH | DRG: 690 | End: 2019-11-03
Attending: INTERNAL MEDICINE | Admitting: INTERNAL MEDICINE
Payer: MEDICARE

## 2019-10-27 DIAGNOSIS — B96.20: ICD-10-CM

## 2019-10-27 DIAGNOSIS — Z88.2: ICD-10-CM

## 2019-10-27 DIAGNOSIS — Z86.711: ICD-10-CM

## 2019-10-27 DIAGNOSIS — E11.8: ICD-10-CM

## 2019-10-27 DIAGNOSIS — B96.4: ICD-10-CM

## 2019-10-27 DIAGNOSIS — E03.9: ICD-10-CM

## 2019-10-27 DIAGNOSIS — Z91.040: ICD-10-CM

## 2019-10-27 DIAGNOSIS — G35: ICD-10-CM

## 2019-10-27 DIAGNOSIS — N39.0: Primary | ICD-10-CM

## 2019-10-27 DIAGNOSIS — L89.132: ICD-10-CM

## 2019-10-27 LAB
ADD MANUAL DIFF: NO
ALBUMIN SERPL-MCNC: 3.6 G/DL (ref 3.5–5)
ALP SERPL-CCNC: 121 U/L (ref 38–126)
ANION GAP SERPL CALC-SCNC: 12 MMOL/L (ref 5–19)
APPEARANCE UR: (no result)
APTT PPP: (no result) S
AST SERPL-CCNC: 25 U/L (ref 14–36)
BASOPHILS # BLD AUTO: 0.1 10^3/UL (ref 0–0.2)
BASOPHILS NFR BLD AUTO: 0.4 % (ref 0–2)
BILIRUB DIRECT SERPL-MCNC: 0.3 MG/DL (ref 0–0.4)
BILIRUB SERPL-MCNC: 0.8 MG/DL (ref 0.2–1.3)
BILIRUB UR QL STRIP: NEGATIVE
BUN SERPL-MCNC: 13 MG/DL (ref 7–20)
CALCIUM: 9.8 MG/DL (ref 8.4–10.2)
CHLORIDE SERPL-SCNC: 110 MMOL/L (ref 98–107)
CO2 SERPL-SCNC: 21 MMOL/L (ref 22–30)
EOSINOPHIL # BLD AUTO: 0.1 10^3/UL (ref 0–0.6)
EOSINOPHIL NFR BLD AUTO: 0.4 % (ref 0–6)
ERYTHROCYTE [DISTWIDTH] IN BLOOD BY AUTOMATED COUNT: 14.1 % (ref 11.5–14)
GLUCOSE SERPL-MCNC: 174 MG/DL (ref 75–110)
GLUCOSE UR STRIP-MCNC: NEGATIVE MG/DL
HCT VFR BLD CALC: 40.3 % (ref 36–47)
HGB BLD-MCNC: 13.1 G/DL (ref 12–15.5)
INR PPP: 1.04
KETONES UR STRIP-MCNC: (no result) MG/DL
LYMPHOCYTES # BLD AUTO: 1 10^3/UL (ref 0.5–4.7)
LYMPHOCYTES NFR BLD AUTO: 6.5 % (ref 13–45)
MCH RBC QN AUTO: 26.6 PG (ref 27–33.4)
MCHC RBC AUTO-ENTMCNC: 32.6 G/DL (ref 32–36)
MCV RBC AUTO: 82 FL (ref 80–97)
MONOCYTES # BLD AUTO: 0.7 10^3/UL (ref 0.1–1.4)
MONOCYTES NFR BLD AUTO: 4.6 % (ref 3–13)
NEUTROPHILS # BLD AUTO: 13.5 10^3/UL (ref 1.7–8.2)
NEUTS SEG NFR BLD AUTO: 88.1 % (ref 42–78)
PH UR STRIP: 7 [PH] (ref 5–9)
PLATELET # BLD: 308 10^3/UL (ref 150–450)
POTASSIUM SERPL-SCNC: 4 MMOL/L (ref 3.6–5)
PROT SERPL-MCNC: 7.2 G/DL (ref 6.3–8.2)
PROT UR STRIP-MCNC: 100 MG/DL
PROTHROMBIN TIME: 13.6 SEC (ref 11.4–15.4)
RBC # BLD AUTO: 4.93 10^6/UL (ref 3.72–5.28)
SP GR UR STRIP: 1.02
TOTAL CELLS COUNTED % (AUTO): 100 %
UROBILINOGEN UR-MCNC: 4 MG/DL (ref ?–2)
WBC # BLD AUTO: 15.3 10^3/UL (ref 4–10.5)

## 2019-10-27 PROCEDURE — 83605 ASSAY OF LACTIC ACID: CPT

## 2019-10-27 PROCEDURE — 87088 URINE BACTERIA CULTURE: CPT

## 2019-10-27 PROCEDURE — 99291 CRITICAL CARE FIRST HOUR: CPT

## 2019-10-27 PROCEDURE — 85025 COMPLETE CBC W/AUTO DIFF WBC: CPT

## 2019-10-27 PROCEDURE — 93005 ELECTROCARDIOGRAM TRACING: CPT

## 2019-10-27 PROCEDURE — 93010 ELECTROCARDIOGRAM REPORT: CPT

## 2019-10-27 PROCEDURE — 80048 BASIC METABOLIC PNL TOTAL CA: CPT

## 2019-10-27 PROCEDURE — 71045 X-RAY EXAM CHEST 1 VIEW: CPT

## 2019-10-27 PROCEDURE — 87040 BLOOD CULTURE FOR BACTERIA: CPT

## 2019-10-27 PROCEDURE — 81001 URINALYSIS AUTO W/SCOPE: CPT

## 2019-10-27 PROCEDURE — 87086 URINE CULTURE/COLONY COUNT: CPT

## 2019-10-27 PROCEDURE — 82962 GLUCOSE BLOOD TEST: CPT

## 2019-10-27 PROCEDURE — 80053 COMPREHEN METABOLIC PANEL: CPT

## 2019-10-27 PROCEDURE — 87150 DNA/RNA AMPLIFIED PROBE: CPT

## 2019-10-27 PROCEDURE — 96361 HYDRATE IV INFUSION ADD-ON: CPT

## 2019-10-27 PROCEDURE — 85610 PROTHROMBIN TIME: CPT

## 2019-10-27 PROCEDURE — 87077 CULTURE AEROBIC IDENTIFY: CPT

## 2019-10-27 PROCEDURE — 87186 SC STD MICRODIL/AGAR DIL: CPT

## 2019-10-27 PROCEDURE — 96375 TX/PRO/DX INJ NEW DRUG ADDON: CPT

## 2019-10-27 PROCEDURE — 36415 COLL VENOUS BLD VENIPUNCTURE: CPT

## 2019-10-27 PROCEDURE — 96365 THER/PROPH/DIAG IV INF INIT: CPT

## 2019-10-27 RX ADMIN — Medication SCH: at 21:56

## 2019-10-27 RX ADMIN — Medication SCH ML: at 17:12

## 2019-10-27 NOTE — EKG REPORT
SEVERITY:- BORDERLINE ECG -

SINUS TACHYCARDIA

LEFT AXIS DEVIATION

CONSIDER ANTERIOR INFARCT

:

Confirmed by: Mahnaz Cortez MD 27-Oct-2019 22:53:43

## 2019-10-27 NOTE — ER DOCUMENT REPORT
ED General





- General


Chief Complaint: Urinary Problem


Stated Complaint: FEVER


Time Seen by Provider: 10/27/19 16:18


Primary Care Provider: 


MEG MCCARTHY MD [Primary Care Provider] - Follow up as needed


Notes: 





HPI: Patient is a 54-year-old female with a past medical history of severe MS 

who presents secondary to some pain with urination around 3 days ago.  Patient 

did not see a doctor.  Temperature of 101 today.  She denies any headache, runny

nose, congestion, sore throat, cough, vomiting, or diarrhea.  She denies any 

chest or abdominal pain.  Patient is bedbound.  She is cared for by her son.  

She does see a local primary care physician here.








ROS:  See HPI


All other review of systems reviewed and otherwise negative





Reviewed vital signs and nursing note as charted by RN.





PHYSICAL EXAM: 





CONSTITUTIONAL: Alert and oriented.  Patient is able to answer questions 

appropriately





HEAD: Normocephalic; atraumatic





EYES: PERRL; Conjunctivae clear, sclerae non-icteric





ENT: Normal nose; no rhinorrhea; moist mucous membranes; pharynx without lesions

noted





NECK: Supple without meningismus; non-tender; no cervical lymphadenopathy, no 

masses





CARD: Tachycardic and regular; no murmurs; symmetric distal pulses





RESP: Normal chest excursion without splinting or tachypnea; breath sounds clear

and equal bilaterally; no wheezes, no rhonchi, no rales





ABD/GI: Normal bowel sounds; very elevated BMI; soft, non-tender; no palpable 

organomegaly or masses





BACK: Patient was rolled showing minimal breakdown of the right lower lateral 

back with no fluctuance or induration





EXT: Normal ROM in all joints; non-tender to palpation; no edema





SKIN: Scaling, crusting skin to multiple sites





NEURO: CN 2-12 intact; patient has very minimal movement of upperand lower 

extremities with bilateral wrist braces in place





PSYCH: The patient's mood and manner are appropriate. Grooming and personal 

hygiene are appropriate.


TRAVEL OUTSIDE OF THE [place] IN LAST 30 DAYS: No





- Related Data


Allergies/Adverse Reactions: 


                                        





latex [Latex] Allergy (Intermediate, Verified 19 14:06)


   RASH/BLISTER


Sulfa (Sulfonamide Antibiotics) Allergy (Intermediate, Verified 19 14:06)


   RASH/HIVES











Past Medical History





- Social History


Smoking Status: Unknown if Ever Smoked


Family History: None, Reviewed & Not Pertinent


Patient has suicidal ideation: No


Patient has homicidal ideation: No





- Past Medical History


Cardiac Medical History: Reports: Hx Pulmonary Embolism - 2 years ago


   Denies: Hx Atrial Fibrillation, Hx Congestive Heart Failure, Hx Heart Attack,

 Hx Hypercholesterolemia, Hx Hypertension


Pulmonary Medical History: 


   Denies: Hx Asthma, Hx Bronchitis, Hx COPD, Hx Pneumonia, Hx Respiratory 

Failure, Hx Sleep Apnea, Hx Tuberculosis


Neurological Medical History: Denies: Hx Cerebrovascular Accident, Hx Migraine, 

Hx Seizures, Hx Parkinson's Disease


Endocrine Medical History: Reports: Hx Diabetes Mellitus Type 2, Hx 

Hypothyroidism.  Denies: Hx Diabetes Mellitus Type 1


Renal/ Medical History: Denies: Hx End Stage Renal Disease, Hx Kidney Stones, 

Hx Peritoneal Dialysis


Malignancy Medical History: Denies: Hx Leukemia, Hx Lung Cancer


GI Medical History: Denies: Hx Gastroesophageal Reflux Disease, Hx Hiatal 

Hernia, Hx Ulcer


Musculoskeletal Medical History: Denies Hx Arthritis, Reports Hx Multiple 

Sclerosis


Psychiatric Medical History: 


   Denies: Hx Attention Deficit Hyperactivity Disorder, Hx Bipolar Disorder, Hx 

Dementia, Hx Depression, Hx Schizophrenia


Infectious Medical History: Denies: Hx HIV


Past Surgical History: Reports: Hx  Section - x2, Hx Cholecystectomy.  

Denies: Hx Appendectomy, Hx Bowel Surgery, Hx Coronary Artery Bypass Graft, Hx 

Gastric Bypass Surgery, Hx Herniorrhaphy, Hx Hysterectomy, Hx Mastectomy, Hx 

Pacemaker, Hx Tonsillectomy, Hx Tubal Ligation





- Immunizations


Immunizations up to date: Yes


Hx Diphtheria, Pertussis, Tetanus Vaccination: Yes





Physical Exam





- Vital signs


Vitals: 


                                        











Resp


 


 17 


 


 10/27/19 16:07














Course





- Re-evaluation


Re-evalutation: 





Given the above history and physical, with possible source, we will order the 

sepsis protocol and provide lactated Ringer solution and Rocephin.





10/27/19 16:47


EKG shows a heart rate of 125, sinus tachycardia, left axis deviation, poor R 

wave progression, no ST elevation or depression.  Flattening T waves in leads I,

 aVL, and laterally








10/27/19 18:33


Lactic acid and chemistry as recorded.  Obvious urinary tract infection.  Heart 

rate is currently 103.  Lactic acid is recorded.  Rocephin has been provided.  

Patient will be admitted to the [facility] for further assessment and treatment.








- Vital Signs


Vital signs: 


                                        











Temp Pulse Resp BP Pulse Ox


 


 99.6 F      18   136/57 H  97 


 


 10/27/19 16:29     10/27/19 17:00  10/27/19 16:46  10/27/19 17:00














- Laboratory


Result Diagrams: 


                                 10/27/19 16:14





                                 10/27/19 17:20


Laboratory results interpreted by me: 


                                        











  10/27/19 10/27/19 10/27/19





  16:14 16:30 16:41


 


WBC  15.3 H  


 


MCH  26.6 L  


 


RDW  14.1 H  


 


Lymph % (Auto)  6.5 L  


 


Absolute Neuts (auto)  13.5 H  


 


Seg Neutrophils %  88.1 H  


 


Chloride   


 


Carbon Dioxide   


 


Glucose   


 


POC Glucose    148 H


 


Urine Protein   100 H 


 


Urine Ketones   TRACE H 


 


Urine Blood   MODERATE H 


 


Urine Nitrite (Reflex)   POSITIVE H 


 


Urine Urobilinogen   4.0 H 


 


Leukocyte Esterase Rfl   LARGE H 














  10/27/19





  17:20


 


WBC 


 


MCH 


 


RDW 


 


Lymph % (Auto) 


 


Absolute Neuts (auto) 


 


Seg Neutrophils % 


 


Chloride  110 H


 


Carbon Dioxide  21 L


 


Glucose  174 H


 


POC Glucose 


 


Urine Protein 


 


Urine Ketones 


 


Urine Blood 


 


Urine Nitrite (Reflex) 


 


Urine Urobilinogen 


 


Leukocyte Esterase Rfl 














Critical Care Note





- Critical Care Note


Total time excluding time spent on procedures (mins): 45





Discharge





- Discharge


Clinical Impression: 


 Multiple sclerosis





UTI (urinary tract infection)


Qualifiers:


 Urinary tract infection type: site unspecified Hematuria presence: without 

hematuria Qualified Code(s): N39.0 - Urinary tract infection, site not specified





Condition: Fair


Disposition: ADMITTED AS INPATIENT


Admitting Provider: Aura


Unit Admitted: IMCU


Referrals: 


MEG MCCARTHY MD [Primary Care Provider] - Follow up as needed

## 2019-10-27 NOTE — RADIOLOGY REPORT (SQ)
EXAM DESCRIPTION:  CHEST SINGLE VIEW



COMPLETED DATE/TIME:  10/27/2019 4:48 pm



REASON FOR STUDY:  11; fever



COMPARISON:  1/24/2019.



EXAM PARAMETERS:  NUMBER OF VIEWS: One view.

TECHNIQUE: Single frontal radiographic view of the chest acquired.

RADIATION DOSE: NA

LIMITATIONS: None.



FINDINGS:  LUNGS AND PLEURA: No acute infiltrates or effusions.

MEDIASTINUM AND HILAR STRUCTURES: No masses.  Contour normal.

HEART AND VASCULAR STRUCTURES: The heart is normal.  The pulmonary vasculature is normal.  .

BONES: No acute findings.

HARDWARE: None in the chest.

OTHER: No other significant finding.



IMPRESSION:  NO ACUTE DISEASE.



TECHNICAL DOCUMENTATION:  JOB ID:  [id number]

[id number]

 2011 [employer]- All Rights Reserved



Reading location - IP/workstation name: [email or web address]

## 2019-10-28 RX ADMIN — Medication SCH: at 05:50

## 2019-10-28 RX ADMIN — INSULIN LISPRO SCH: 100 INJECTION, SOLUTION INTRAVENOUS; SUBCUTANEOUS at 17:20

## 2019-10-28 RX ADMIN — INSULIN LISPRO SCH: 100 INJECTION, SOLUTION INTRAVENOUS; SUBCUTANEOUS at 21:21

## 2019-10-28 RX ADMIN — CEFTRIAXONE SCH MLS/HR: 1 INJECTION, SOLUTION INTRAVENOUS at 17:23

## 2019-10-28 RX ADMIN — Medication SCH: at 21:20

## 2019-10-28 RX ADMIN — Medication SCH: at 14:00

## 2019-10-28 RX ADMIN — INSULIN LISPRO SCH: 100 INJECTION, SOLUTION INTRAVENOUS; SUBCUTANEOUS at 12:12

## 2019-10-29 RX ADMIN — INSULIN LISPRO SCH: 100 INJECTION, SOLUTION INTRAVENOUS; SUBCUTANEOUS at 16:35

## 2019-10-29 RX ADMIN — INSULIN LISPRO SCH: 100 INJECTION, SOLUTION INTRAVENOUS; SUBCUTANEOUS at 12:22

## 2019-10-29 RX ADMIN — INSULIN LISPRO SCH: 100 INJECTION, SOLUTION INTRAVENOUS; SUBCUTANEOUS at 23:32

## 2019-10-29 RX ADMIN — CEFTRIAXONE SCH MLS/HR: 1 INJECTION, SOLUTION INTRAVENOUS at 18:16

## 2019-10-29 RX ADMIN — INSULIN LISPRO SCH: 100 INJECTION, SOLUTION INTRAVENOUS; SUBCUTANEOUS at 07:55

## 2019-10-29 RX ADMIN — Medication SCH: at 05:04

## 2019-10-29 RX ADMIN — Medication SCH ML: at 21:32

## 2019-10-29 RX ADMIN — Medication SCH: at 14:45

## 2019-10-29 NOTE — PDOC PROGRESS REPORT
Subjective


Progress Note for:: 10/29/19


Subjective:: 





Patient denied any fever or chills. No nausea, vomiting or abdominal pain. No 

chest pain or difficulty with breathing. Remain on IV Ceftriaxone coverage 


Reason For Visit: 


UTI,MULTIPLE SCLEROSIS,DIABETES MELLITUS TYPE 2








Physical Exam


Vital Signs: 


                                        











Temp Pulse Resp BP Pulse Ox


 


 97.3 F   76   16   128/73 H  100 


 


 10/29/19 02:52  10/29/19 14:00  10/29/19 02:52  10/29/19 02:52  10/29/19 02:52








                                 Intake & Output











 10/28/19 10/29/19 10/30/19





 06:59 06:59 06:59


 


Intake Total 3300 770 240


 


Output Total 


 


Balance 3100 395 -2660


 


Weight 83.5 kg 83.5 kg 83.5 kg











General appearance: PRESENT: no acute distress


Head exam: PRESENT: atraumatic, normocephalic


Eye exam: PRESENT: conjunctiva pink.  ABSENT: scleral icterus


Ear exam: PRESENT: normal external ear exam


Mouth exam: PRESENT: moist


Respiratory exam: PRESENT: clear to auscultation elver, decreased breath sounds - 

at lung bases


Cardiovascular exam: PRESENT: RRR.  ABSENT: diastolic murmur, rubs, systolic 

murmur


Vascular exam: ABSENT: pallor


GI/Abdominal exam: PRESENT: normal bowel sounds, soft.  ABSENT: distended, 

guarding, mass, organolmegaly, rebound, tenderness


Extremities exam: ABSENT: pedal edema


Neurological exam: PRESENT: alert, awake, oriented to person, oriented to place,

oriented to time, oriented to situation


Psychiatric exam: PRESENT: appropriate affect, normal mood.  ABSENT: homicidal 

ideation, suicidal ideation


Skin exam: PRESENT: dry, warm, other - stage 2 sacral pressure ulcer and easily 

bleeding abrassion wound on upper back region.





Results


Laboratory Results: 


                                        





                                 10/27/19 16:14 





                                 10/27/19 17:20 





                                        





10/27/19 19:22   Blood   Blood Culture (PCR) - Final


                            Staphylococcus Species


10/27/19 16:30   Catheterized Urine   Urine Culture - Final


                            Escherichia Coli


                            Proteus Mirabilis








Impressions: 


                                        





Chest X-Ray  10/27/19 16:18


IMPRESSION:  NO ACUTE DISEASE.


 














Assessment & Plan





- Diagnosis


(1) Proteus mirabilis infection


Is this a current diagnosis for this admission?: Yes   


Plan: 


Continue IV Ceftriaxone coverage.








(2) E. coli UTI (urinary tract infection)


Is this a current diagnosis for this admission?: Yes   


Plan: 


Continue IV Ceftriaxone coverage.








(3) Urinary tract infection


Qualifiers: 


   Urinary tract infection type: site unspecified   Hematuria presence: without 

hematuria   Qualified Code(s): N39.0 - Urinary tract infection, site not spec

ified   


Is this a current diagnosis for this admission?: Yes   


Plan: 


Due to E. Coli and Proteus Mirabilis as noted above. Both sensitive to Ceftria

xone.








(4) Diabetes mellitus type 2 in nonobese


Is this a current diagnosis for this admission?: Yes   


Plan: 


Continue current medication management.








(5) Hypothyroidism


Qualifiers: 


   Hypothyroidism type: unspecified   Qualified Code(s): E03.9 - Hypothyroidism,

unspecified   


Is this a current diagnosis for this admission?: Yes   





(6) Multiple sclerosis, primary chronic progressive


Is this a current diagnosis for this admission?: Yes   





- Time


Time Spent with patient: 25-34 minutes


Medications reviewed and adjusted accordingly: Yes


Anticipated discharge: Home with Homehealth


Within: Other





- Inpatient Certification


Based on my medical assessment, after consideration of the patient's 

comorbidities, presenting symptoms, or acuity I expect that the services needed 

warrant INPATIENT care.: Yes


I certify that my determination is in accordance with my understanding of 

Medicare's requirements for reasonable and necessary INPATIENT services [42 CFR 

412.3e].: Yes


Medical Necessity: Significant Comorbidiites Make Outpatient Treatment Too 

Risky, Need Close Monitoring Due to Risk of Patient Decompensation, Need For IV 

Fluids, Need For Continuous Telemetry Monitoring, Need for IV Antibiotics, Risk 

of Complication if Not Cared For in Hospital, Risk of Diagnosis Which Will 

Require Inpatient Eval/Care/Monitoring


Post Hospital Care: D/C Planner Documentation





- Plan Summary


Plan Summary: 





Continue current medication and wound dressing management. Patient will benefit 

from HHA services including VN, PT/OT, and aide upon discharge. She expressed 

need for wheelchair at home upon discharge. follow up on blood culture findings.

Currently identifier as Staphylococcus species as per PCR and gram positive 

cocci in cluster on culture plate.

## 2019-10-29 NOTE — PDOC H&P
History of Present Illness


Admission Date/PCP: 


  10/27/19 18:37





  MEG MCCARTHY





History of Present Illness: 


VI PAIGE is a 54 year old female known to my practice presented to the 

ED with family, daughter, reported change in mentation, dysuria, poor oral 

intake, dissociation in her gaze and episode of fever. Son at bedside concurred 

with reported symptoms for couple of days and patient usually present such 

symptoms with episodes of urinary tract infection. Patient has history of 

progressive multiple sclerosis and currently bedbound. Son remain primary 

caregiver at home. Her initial evaluation ion the Ed was significant for 

abnormal urinalysis with leukocytosis suggestive of urinary tract infection. She

was advised hospitalization for further evaluation and management. Her 

morbidities are as listed below.





Past Medical History


Cardiac Medical History: Reports: Pulmonary Embolism - 2 years ago


   Denies: Atrial Fibrillation, Congestive Heart Failure, Myocardial Infarction,

Hyperlipidema, Hypertension


Pulmonary Medical History: 


   Denies: Asthma, Bronchitis, Chronic Obstructive Pulmonary Disease (COPD), Pne

um[place], Respiratory Failure, Sleep Apnea, Tuberculosis


Neurological Medical History: Reports: Multiple Sclerosis


   Denies: Migraine, Seizures


Endocrine Medical History: Reports: Diabetes Mellitus Type 2, Hypothyroidism


   Denies: Diabetes Mellitus Type 1


Renal/ Medical History: 


   Denies: End Stage Renal Disease


Malignancy Medical History: 


   Denies: Leukemia, Lung Cancer


GI Medical History: 


   Denies: Gastroesophageal Reflux Disease, Hiatal Hernia


Musculoskeltal Medical History: 


   Denies: Arthritis


Psychiatric Medical History: 


   Denies: Attention Deficit Hyperactivity Disorder, Bipolar Disorder, Dementia,

Depression


Hematology: Reports: Anemia


   Denies: Hemophilia, Sickle Cell Disease


Infectious Medical History: 


   Denies: HIV





Past Surgical History


Past Surgical History: Reports:  Section - x2, Cholecystectomy


   Denies: Appendectomy, Coronary Artery Bypass Graft, Gastric Bypass Surgery, 

Herniorrhaphy, Hysterectomy, Mastectomy, Pacemaker, Tonsillectomy, Tubal 

Ligation





Social History


Smoking Status: Never Smoker


Electronic Cigarette use?: No


Frequency of Alcohol Use: None


Hx Recreational Drug Use: No


Drugs: None


Hx Prescription Drug Abuse: No





- Advance Directive


Resuscitation Status: Full Code





Family History


Family History: None, Reviewed & Not Pertinent


Parental Family History Reviewed: Yes


Children Family History Reviewed: Yes


Sibling(s) Family History Reviewed.: Yes





Medication/Allergy


Home Medications: 








Acetaminophen [Tylenol Extra Strength 500 mg Tablet] 1,000 mg PO Q6HP PRN 

10/28/19 


Baclofen [Baclofen 10 mg Tablet] 10 mg PO TIDP PRN 10/28/19 


Multivitamin [Multiple Vitamins] 1 tab PO DAILY 10/28/19 








Allergies/Adverse Reactions: 


                                        





latex [Latex] Allergy (Intermediate, Verified 19 14:06)


   RASH/BLISTER


Sulfa (Sulfonamide Antibiotics) Allergy (Intermediate, Verified 19 14:06)


   RASH/HIVES











Review of Systems


All systems: reviewed and no additional remarkable complaints except as stated





Physical Exam


Vital Signs: 


                                        











Temp Pulse Resp BP Pulse Ox


 


 97.4 F   90   20   121/60   100 


 


 10/28/19 03:20  10/28/19 07:00  10/28/19 03:20  10/28/19 03:20  10/28/19 03:20








                                 Intake & Output











 10/27/19 10/28/19 10/29/19





 06:59 06:59 06:59


 


Intake Total  3300 


 


Output Total  200 


 


Balance  3100 


 


Weight  83.5 kg 











General appearance: PRESENT: no acute distress


Head exam: PRESENT: atraumatic, normocephalic


Eye exam: PRESENT: conjunctiva pink, EOMI, PERRLA.  ABSENT: scleral icterus


Mouth exam: PRESENT: moist


Respiratory exam: PRESENT: clear to auscultation elver, decreased breath sounds - 

at lung bases


Cardiovascular exam: PRESENT: RRR.  ABSENT: diastolic murmur, rubs, systolic 

murmur


Vascular exam: ABSENT: pallor


GI/Abdominal exam: PRESENT: normal bowel sounds, soft.  ABSENT: distended, 

guarding, mass, organolmegaly, rebound, tenderness


Gentrourinary exam: PRESENT: indwelling catheter


Musculoskeletal exam: PRESENT: deformity - contractire deformity involving 

fingers and wrist joints


Neurological exam: PRESENT: alert, awake, oriented to person, oriented to place,

 oriented to time, oriented to situation, abnormal gait - due to multiple 

sclerosis, other.  ABSENT: normal gait - bedbound


Skin exam: PRESENT: dry, warm, other - multiple areas of scaling on extremities,

 pressure ulcer on upper region





Results


Laboratory Results: 


                                        





                                 10/27/19 16:14 





                                 10/27/19 17:20 





                                        











  10/27/19 10/27/19 10/27/19





  16:14 16:14 16:14


 


WBC  15.3 H  


 


RBC  4.93  


 


Hgb  13.1  


 


Hct  40.3  


 


MCV  82  


 


MCH  26.6 L  


 


MCHC  32.6  


 


RDW  14.1 H  


 


Plt Count  308  


 


Seg Neutrophils %  88.1 H  


 


Sodium   Cancelled 


 


Potassium   Cancelled 


 


Chloride   Cancelled 


 


Carbon Dioxide   Cancelled 


 


Anion Gap   Cancelled 


 


BUN   Cancelled 


 


Creatinine   Cancelled 


 


Est GFR ( Amer)   Cancelled 


 


Est GFR (Non-Af Amer)   Cancelled 


 


Glucose   Cancelled 


 


Lactic Acid    1.0


 


Calcium   Cancelled 


 


Total Bilirubin   Cancelled 


 


AST   Cancelled 


 


Alkaline Phosphatase   Cancelled 


 


Total Protein   Cancelled 


 


Albumin   Cancelled 


 


Urine Color   


 


Urine Appearance   


 


Urine pH   


 


Ur Specific Gravity   


 


Urine Protein   


 


Urine Glucose (UA)   


 


Urine Ketones   


 


Urine Blood   


 


Urine RBC (Auto)   














  10/27/19 10/27/19





  16:30 17:20


 


WBC  


 


RBC  


 


Hgb  


 


Hct  


 


MCV  


 


MCH  


 


MCHC  


 


RDW  


 


Plt Count  


 


Seg Neutrophils %  


 


Sodium   143.0


 


Potassium   4.0


 


Chloride   110 H


 


Carbon Dioxide   21 L


 


Anion Gap   12


 


BUN   13


 


Creatinine   0.55


 


Est GFR ( Amer)   > 60


 


Est GFR (Non-Af Amer)  


 


Glucose   174 H


 


Lactic Acid  


 


Calcium   9.8


 


Total Bilirubin   0.8


 


AST   25


 


Alkaline Phosphatase   121


 


Total Protein   7.2


 


Albumin   3.6


 


Urine Color  CALVIN 


 


Urine Appearance  CLOUDY 


 


Urine pH  7.0 


 


Ur Specific Gravity  1.016 


 


Urine Protein  100 H 


 


Urine Glucose (UA)  NEGATIVE 


 


Urine Ketones  TRACE H 


 


Urine Blood  MODERATE H 


 


Urine RBC (Auto)  >182 











Impressions: 


                                        





Chest X-Ray  10/27/19 16:18


IMPRESSION:  NO ACUTE DISEASE.


 














Assessment & Plan





- Diagnosis


(1) Urinary tract infection


Qualifiers: 


   Urinary tract infection type: site unspecified   Hematuria presence: without 

hematuria   Qualified Code(s): N39.0 - Urinary tract infection, site not 

specified   


Is this a current diagnosis for this admission?: Yes   


Plan: 


See admitting attending physician orders for details about care plan.








(2) Diabetes mellitus type 2 in nonobese


Is this a current diagnosis for this admission?: Yes   


Plan: 


See admitting attending physician orders for details about care plan.








(3) Hypothyroidism


Qualifiers: 


   Hypothyroidism type: unspecified   Qualified Code(s): E03.9 - Hypothyroidism,

 unspecified   


Is this a current diagnosis for this admission?: Yes   


Plan: 


See admitting attending physician orders for details about care plan.








(4) Multiple sclerosis, primary chronic progressive


Is this a current diagnosis for this admission?: Yes   


Plan: 


See admitting attending physician orders for details about care plan.








- Time


Time Spent: 50 to 70 Minutes


Medications reviewed and adjusted accordingly: Yes


Anticipated discharge: Home with Homehealth


Within: Other





- Inpatient Certification


Based on my medical assessment, after consideration of the patient's comor

bidities, presenting symptoms, or acuity I expect that the services needed 

warrant INPATIENT care.: Yes


I certify that my determination is in accordance with my understanding of 

Medicare's requirements for reasonable and necessary INPATIENT services [42 CFR 

412.3e].: Yes


Medical Necessity: Significant Comorbidiites Make Outpatient Treatment Too 

Risky, Need Close Monitoring Due to Risk of Patient Decompensation, Need For IV 

Fluids, Need For Continuous Telemetry Monitoring, Need for IV Antibiotics, Risk 

of Complication if Not Cared For in Hospital, Risk of Diagnosis Which Will 

Require Inpatient Eval/Care/Monitoring


Post Hospital Care: D/C Planner Documentation





- Plan Summary


Plan Summary: 





See admitting attending physician orders for details about care plan.

## 2019-10-30 RX ADMIN — Medication SCH: at 22:24

## 2019-10-30 RX ADMIN — INSULIN LISPRO SCH: 100 INJECTION, SOLUTION INTRAVENOUS; SUBCUTANEOUS at 17:43

## 2019-10-30 RX ADMIN — Medication SCH ML: at 05:34

## 2019-10-30 RX ADMIN — INSULIN LISPRO SCH: 100 INJECTION, SOLUTION INTRAVENOUS; SUBCUTANEOUS at 13:54

## 2019-10-30 RX ADMIN — CEFTRIAXONE SCH MLS/HR: 1 INJECTION, SOLUTION INTRAVENOUS at 17:44

## 2019-10-30 RX ADMIN — INSULIN LISPRO SCH: 100 INJECTION, SOLUTION INTRAVENOUS; SUBCUTANEOUS at 22:22

## 2019-10-30 RX ADMIN — INSULIN LISPRO SCH: 100 INJECTION, SOLUTION INTRAVENOUS; SUBCUTANEOUS at 09:20

## 2019-10-30 RX ADMIN — Medication SCH: at 17:43

## 2019-10-30 NOTE — PDOC PROGRESS REPORT
Subjective


Progress Note for:: 10/30/19


Subjective:: 


Patient denied chest pain or difficulty with breathing. Remain on IV Ceftriaxone

coverage. No fever, chills, nausea, vomiting or abdominal pain.


Reason For Visit: 


UTI,MULTIPLE SCLEROSIS,DIABETES MELLITUS TYPE 2








Physical Exam


Vital Signs: 


                                        











Temp Pulse Resp BP Pulse Ox


 


 97.3 F   63   16   128/73 H  100 


 


 10/29/19 02:52  10/30/19 14:00  10/29/19 02:52  10/29/19 02:52  10/29/19 02:52








                                 Intake & Output











 10/29/19 10/30/19 10/31/19





 06:59 06:59 06:59


 


Intake Total 770 410 480


 


Output Total 375 2900 


 


Balance 395 -2490 480


 


Weight 83.5 kg 83.5 kg 











Physical Exam: 


General appearance: PRESENT: no acute distress


Head exam: PRESENT: atraumatic, normocephalic


Eye exam: PRESENT: conjunctiva pink.  ABSENT: pallor, scleral icterus


Ear exam: PRESENT: normal external ear exam


Mouth exam: PRESENT: moist


Respiratory exam: PRESENT: clear to auscultation elver, decreased breath sounds - 

at lung bases


Cardiovascular exam: PRESENT: RRR.  ABSENT: diastolic murmur, rubs, systolic 

murmur


GI/Abdominal exam: PRESENT: normal bowel sounds, soft.  ABSENT: distended, 

guarding, mass, organomegaly, rebound, tenderness


Extremities exam: ABSENT: pedal edema


Neurological exam: PRESENT: alert, awake, oriented to person, oriented to place,

oriented to time, oriented to situation


Psychiatric exam: PRESENT: appropriate affect, normal mood.  ABSENT: homicidal 

ideation, suicidal ideation


Skin exam: PRESENT: dry, warm, other - abrasion skin open and stage 2 sacral 

pressure ulcer.





Results


Laboratory Results: 


                                        





                                 10/27/19 16:14 





                                 10/27/19 17:20 





                                        





10/27/19 19:22   Blood   Blood Culture (PCR) - Final


                            Staphylococcus Species








Impressions: 


                                        





Chest X-Ray  10/27/19 16:18


IMPRESSION:  NO ACUTE DISEASE.


 














Assessment & Plan





- Diagnosis


(1) Proteus mirabilis infection


Is this a current diagnosis for this admission?: Yes   





(2) E. coli UTI (urinary tract infection)


Is this a current diagnosis for this admission?: Yes   





(3) Urinary tract infection


Qualifiers: 


   Urinary tract infection type: site unspecified   Hematuria presence: without 

hematuria   Qualified Code(s): N39.0 - Urinary tract infection, site not 

specified   


Is this a current diagnosis for this admission?: Yes   





(4) Diabetes mellitus type 2 in nonobese


Is this a current diagnosis for this admission?: Yes   





(5) Hypothyroidism


Qualifiers: 


   Hypothyroidism type: unspecified   Qualified Code(s): E03.9 - Hypothyroidism,

unspecified   


Is this a current diagnosis for this admission?: Yes   





(6) Multiple sclerosis, primary chronic progressive


Is this a current diagnosis for this admission?: Yes   





- Time


Time Spent with patient: 25-34 minutes


Medications reviewed and adjusted accordingly: Yes


Anticipated discharge: Home with Homehealth


Within: Other





- Inpatient Certification


Based on my medical assessment, after consideration of the patient's comorbiditi

es, presenting symptoms, or acuity I expect that the services needed warrant 

INPATIENT care.: Yes


I certify that my determination is in accordance with my understanding of Med

icare's requirements for reasonable and necessary INPATIENT services [42 CFR 

412.3e].: Yes


Medical Necessity: Significant Comorbidiites Make Outpatient Treatment Too 

Risky, Need Close Monitoring Due to Risk of Patient Decompensation, Need For IV 

Fluids, Need For Continuous Telemetry Monitoring, Need for IV Antibiotics, Risk 

of Complication if Not Cared For in Hospital, Risk of Diagnosis Which Will 

Require Inpatient Eval/Care/Monitoring


Post Hospital Care: D/C Planner Documentation





- Plan Summary


Plan Summary: 





Continue IV Ceftriaxone coverage. Follow up on blood culture findings. Obtain 

CBC with diff, BMP in am.

## 2019-10-31 LAB
ADD MANUAL DIFF: NO
ANION GAP SERPL CALC-SCNC: 10 MMOL/L (ref 5–19)
BASOPHILS # BLD AUTO: 0.1 10^3/UL (ref 0–0.2)
BASOPHILS NFR BLD AUTO: 0.9 % (ref 0–2)
BUN SERPL-MCNC: 9 MG/DL (ref 7–20)
CALCIUM: 9.9 MG/DL (ref 8.4–10.2)
CHLORIDE SERPL-SCNC: 110 MMOL/L (ref 98–107)
CO2 SERPL-SCNC: 24 MMOL/L (ref 22–30)
EOSINOPHIL # BLD AUTO: 0.4 10^3/UL (ref 0–0.6)
EOSINOPHIL NFR BLD AUTO: 6.2 % (ref 0–6)
ERYTHROCYTE [DISTWIDTH] IN BLOOD BY AUTOMATED COUNT: 13.5 % (ref 11.5–14)
GLUCOSE SERPL-MCNC: 87 MG/DL (ref 75–110)
HCT VFR BLD CALC: 34.8 % (ref 36–47)
HGB BLD-MCNC: 11.8 G/DL (ref 12–15.5)
LYMPHOCYTES # BLD AUTO: 2.8 10^3/UL (ref 0.5–4.7)
LYMPHOCYTES NFR BLD AUTO: 47.6 % (ref 13–45)
MCH RBC QN AUTO: 27.4 PG (ref 27–33.4)
MCHC RBC AUTO-ENTMCNC: 33.9 G/DL (ref 32–36)
MCV RBC AUTO: 81 FL (ref 80–97)
MONOCYTES # BLD AUTO: 0.4 10^3/UL (ref 0.1–1.4)
MONOCYTES NFR BLD AUTO: 7.2 % (ref 3–13)
NEUTROPHILS # BLD AUTO: 2.2 10^3/UL (ref 1.7–8.2)
NEUTS SEG NFR BLD AUTO: 38.1 % (ref 42–78)
PLATELET # BLD: 282 10^3/UL (ref 150–450)
POTASSIUM SERPL-SCNC: 3.8 MMOL/L (ref 3.6–5)
RBC # BLD AUTO: 4.31 10^6/UL (ref 3.72–5.28)
TOTAL CELLS COUNTED % (AUTO): 100 %
WBC # BLD AUTO: 5.8 10^3/UL (ref 4–10.5)

## 2019-10-31 RX ADMIN — Medication SCH: at 21:17

## 2019-10-31 RX ADMIN — INSULIN LISPRO SCH: 100 INJECTION, SOLUTION INTRAVENOUS; SUBCUTANEOUS at 21:17

## 2019-10-31 RX ADMIN — DOXYCYCLINE HYCLATE SCH MG: 100 TABLET ORAL at 18:52

## 2019-10-31 RX ADMIN — Medication SCH: at 05:06

## 2019-10-31 RX ADMIN — CEFTRIAXONE SCH MLS/HR: 1 INJECTION, SOLUTION INTRAVENOUS at 18:00

## 2019-10-31 RX ADMIN — INSULIN LISPRO SCH: 100 INJECTION, SOLUTION INTRAVENOUS; SUBCUTANEOUS at 10:53

## 2019-10-31 RX ADMIN — INSULIN LISPRO SCH: 100 INJECTION, SOLUTION INTRAVENOUS; SUBCUTANEOUS at 13:46

## 2019-10-31 RX ADMIN — INSULIN LISPRO SCH: 100 INJECTION, SOLUTION INTRAVENOUS; SUBCUTANEOUS at 17:57

## 2019-10-31 RX ADMIN — Medication SCH: at 13:43

## 2019-10-31 NOTE — PDOC PROGRESS REPORT
Subjective


Progress Note for:: 10/31/19


Subjective:: 


Patient denied chest pain, difficulty with breathing, fever, chills, nausea, 

vomiting or abdominal pain. Remain on IV Rocephin coverage. Blood culture 

suggested resistance from Staph Epidermidis.


Reason For Visit: 


UTI,MULTIPLE SCLEROSIS,DIABETES MELLITUS TYPE 2








Physical Exam


Vital Signs: 


                                        











Temp Pulse Resp BP Pulse Ox


 


 98.1 F   71   16   145/84 H  99 


 


 10/31/19 15:20  10/31/19 15:20  10/31/19 15:20  10/31/19 15:20  10/31/19 15:20








                                 Intake & Output











 10/30/19 10/31/19 11/01/19





 06:59 06:59 06:59


 


Intake Total 410 530 


 


Output Total 2900 700 


 


Balance -2490 -170 


 


Weight 83.5 kg 85.7 kg 











Physical Exam: 


General appearance: PRESENT: no acute distress


Head exam: PRESENT: atraumatic, normocephalic


Eye exam: PRESENT: conjunctiva pink.  ABSENT: pallor, scleral icterus


Ear exam: PRESENT: normal external ear exam


Mouth exam: PRESENT: moist


Respiratory exam: PRESENT: clear to auscultation bilaterally


Cardiovascular exam: PRESENT: RRR.  ABSENT: diastolic murmur, rubs, systolic 

murmur


GI/Abdominal exam: PRESENT: normal bowel sounds, soft.  ABSENT: distended, 

guarding, mass, organomegaly, rebound, tenderness


Extremities exam: ABSENT: pedal edema


Neurological exam: PRESENT: alert, awake, oriented to person, oriented to place,

oriented to time, oriented to situation


Psychiatric exam: PRESENT: appropriate affect, normal mood.  ABSENT: homicidal 

ideation, suicidal ideation


Skin exam: PRESENT: dry, warm, other - abrasion skin open and stage 2 sacral 

pressure ulcer.





Results


Laboratory Results: 


                                        





                                 10/27/19 16:14 





                                 10/27/19 17:20 





                                        





10/27/19 19:22   Blood   Blood Culture (PCR) - Final


                            Staphylococcus Species


10/27/19 19:22   Blood   Blood Culture - Final


                            Staphylococcus Simulans


                            Staphylococcus Epidermidis








Impressions: 


                                        





Chest X-Ray  10/27/19 16:18


IMPRESSION:  NO ACUTE DISEASE.


 














Assessment & Plan





- Diagnosis


(1) Proteus mirabilis infection


Is this a current diagnosis for this admission?: Yes   





(2) E. coli UTI (urinary tract infection)


Is this a current diagnosis for this admission?: Yes   





(3) Urinary tract infection


Qualifiers: 


   Urinary tract infection type: site unspecified   Hematuria presence: without 

hematuria   Qualified Code(s): N39.0 - Urinary tract infection, site not 

specified   


Is this a current diagnosis for this admission?: Yes   





(4) Diabetes mellitus type 2 in nonobese


Is this a current diagnosis for this admission?: Yes   





(5) Hypothyroidism


Qualifiers: 


   Hypothyroidism type: unspecified   Qualified Code(s): E03.9 - Hypothyroidism,

unspecified   


Is this a current diagnosis for this admission?: Yes   





(6) Multiple sclerosis, primary chronic progressive


Is this a current diagnosis for this admission?: Yes   





- Time


Time Spent with patient: 25-34 minutes


Level of Care: IMCU


Medications reviewed and adjusted accordingly: Yes


Within: within 48 hours





- Inpatient Certification


Based on my medical assessment, after consideration of the patient's 

comorbidities, presenting symptoms, or acuity I expect that the services needed 

warrant INPATIENT care.: Yes


I certify that my determination is in accordance with my understanding of 

Medicare's requirements for reasonable and necessary INPATIENT services [42 CFR 

412.3e].: Yes


Medical Necessity: Significant Comorbidiites Make Outpatient Treatment Too 

Risky, Need Close Monitoring Due to Risk of Patient Decompensation, Need For 

Continuous Telemetry Monitoring, Need for IV Antibiotics, Risk of Complication 

if Not Cared For in Hospital, Risk of Diagnosis Which Will Require Inpatient 

Eval/Care/Monitoring


Post Hospital Care: D/C Planner Documentation





- Plan Summary


Plan Summary: 





Start on oral Doxycycline. Patient's allergy to sulfa drug limit use of Bactrim 

DS which would have covered all grown bacteria. Consider transition to Augmentin

upon discharge. Continue all other current medication management. Obtain CBC 

with diff, BMP.

## 2019-11-01 RX ADMIN — Medication SCH ML: at 05:20

## 2019-11-01 RX ADMIN — CEFTRIAXONE SCH MLS/HR: 1 INJECTION, SOLUTION INTRAVENOUS at 18:03

## 2019-11-01 RX ADMIN — INSULIN LISPRO SCH: 100 INJECTION, SOLUTION INTRAVENOUS; SUBCUTANEOUS at 21:29

## 2019-11-01 RX ADMIN — DOXYCYCLINE HYCLATE SCH MG: 100 TABLET ORAL at 05:19

## 2019-11-01 RX ADMIN — Medication SCH ML: at 13:49

## 2019-11-01 RX ADMIN — INSULIN LISPRO SCH: 100 INJECTION, SOLUTION INTRAVENOUS; SUBCUTANEOUS at 15:52

## 2019-11-01 RX ADMIN — INSULIN LISPRO SCH: 100 INJECTION, SOLUTION INTRAVENOUS; SUBCUTANEOUS at 12:00

## 2019-11-01 RX ADMIN — DOXYCYCLINE HYCLATE SCH MG: 100 TABLET ORAL at 18:03

## 2019-11-01 RX ADMIN — Medication SCH: at 21:30

## 2019-11-01 RX ADMIN — MULTIVITAMIN TABLET SCH TAB: TABLET at 09:37

## 2019-11-01 RX ADMIN — INSULIN LISPRO SCH: 100 INJECTION, SOLUTION INTRAVENOUS; SUBCUTANEOUS at 08:18

## 2019-11-01 NOTE — PDOC PROGRESS REPORT
Subjective


Progress Note for:: 11/01/19


Subjective:: 


Patient denied chest pain, difficulty with breathing, fever, chills, nausea, 

vomiting or abdominal pain.


Reason For Visit: 


UTI,MULTIPLE SCLEROSIS,DIABETES MELLITUS TYPE 2








Physical Exam


Vital Signs: 


                                        











Temp Pulse Resp BP Pulse Ox


 


 98.1 F   95   16   145/90 H  100 


 


 11/01/19 11:34  11/01/19 14:00  11/01/19 11:34  11/01/19 11:34  11/01/19 11:34








                                 Intake & Output











 10/31/19 11/01/19 11/02/19





 06:59 06:59 06:59


 


Intake Total 530 350 


 


Output Total 700 725 


 


Balance -170 -375 


 


Weight 85.7 kg 86.9 kg 











Physical Exam: 


General appearance: PRESENT: no acute distress


Head exam: PRESENT: atraumatic, normocephalic


Eye exam: PRESENT: conjunctiva pink.  ABSENT: pallor, scleral icterus


Ear exam: PRESENT: normal external ear exam


Mouth exam: PRESENT: moist


Respiratory exam: PRESENT: clear to auscultation bilaterally


Cardiovascular exam: PRESENT: RRR.  ABSENT: diastolic murmur, rubs, systolic 

murmur


GI/Abdominal exam: PRESENT: normal bowel sounds, soft.  ABSENT: distended, 

guarding, mass, organomegaly, rebound, tenderness


Extremities exam: ABSENT: pedal edema


Neurological exam: PRESENT: alert, awake, oriented to person, oriented to place,

oriented to time, oriented to situation


Psychiatric exam: PRESENT: appropriate affect, normal mood.  ABSENT: homicidal 

ideation, suicidal ideation


Skin exam: PRESENT: dry, warm, other - abrasion skin open and stage 2 sacral 

pressure ulcer.





Results


Laboratory Results: 


                                        





                                 10/31/19 18:19 





                                 10/31/19 18:19 





                                        











  10/31/19 10/31/19





  18:19 18:19


 


WBC  5.8 


 


RBC  4.31 


 


Hgb  11.8 L 


 


Hct  34.8 L 


 


MCV  81 


 


MCH  27.4 


 


MCHC  33.9 


 


RDW  13.5 


 


Plt Count  282 


 


Seg Neutrophils %  38.1 L 


 


Sodium   144.1


 


Potassium   3.8


 


Chloride   110 H


 


Carbon Dioxide   24


 


Anion Gap   10


 


BUN   9


 


Creatinine   0.52


 


Est GFR (African Amer)   > 60


 


Glucose   87


 


Calcium   9.9











Impressions: 


                                        





Chest X-Ray  10/27/19 16:18


IMPRESSION:  NO ACUTE DISEASE.


 














Assessment & Plan





- Diagnosis


(1) Proteus mirabilis infection


Is this a current diagnosis for this admission?: Yes   





(2) E. coli UTI (urinary tract infection)


Is this a current diagnosis for this admission?: Yes   





(3) Urinary tract infection


Qualifiers: 


   Urinary tract infection type: site unspecified   Hematuria presence: without 

hematuria   Qualified Code(s): N39.0 - Urinary tract infection, site not 

specified   


Is this a current diagnosis for this admission?: Yes   





(4) Diabetes mellitus type 2 in nonobese


Is this a current diagnosis for this admission?: Yes   





(5) Hypothyroidism


Qualifiers: 


   Hypothyroidism type: unspecified   Qualified Code(s): E03.9 - Hypothyroidism,

unspecified   


Is this a current diagnosis for this admission?: Yes   





(6) Multiple sclerosis, primary chronic progressive


Is this a current diagnosis for this admission?: Yes   





- Time


Time Spent with patient: 25-34 minutes


Medications reviewed and adjusted accordingly: Yes


Anticipated discharge: Home with Homehealth


Within: Other





- Inpatient Certification


Based on my medical assessment, after consideration of the patient's 

comorbidities, presenting symptoms, or acuity I expect that the services needed 

warrant INPATIENT care.: Yes


I certify that my determination is in accordance with my understanding of 

Medicare's requirements for reasonable and necessary INPATIENT services [42 CFR 

412.3e].: Yes


Medical Necessity: Significant Comorbidiites Make Outpatient Treatment Too 

Risky, Need Close Monitoring Due to Risk of Patient Decompensation, Need For IV 

Fluids, Need For Continuous Telemetry Monitoring, Need for IV Antibiotics, Risk 

of Complication if Not Cared For in Hospital, Risk of Diagnosis Which Will 

Require Inpatient Eval/Care/Monitoring


Post Hospital Care: D/C Planner Documentation





- Plan Summary


Plan Summary: 





Continue current management. Follow up on blood culture findings.

## 2019-11-02 VITALS — SYSTOLIC BLOOD PRESSURE: 127 MMHG | DIASTOLIC BLOOD PRESSURE: 67 MMHG

## 2019-11-02 RX ADMIN — Medication SCH: at 13:06

## 2019-11-02 RX ADMIN — DOXYCYCLINE HYCLATE SCH MG: 100 TABLET ORAL at 05:48

## 2019-11-02 RX ADMIN — DOXYCYCLINE HYCLATE SCH MG: 100 TABLET ORAL at 17:39

## 2019-11-02 RX ADMIN — INSULIN LISPRO SCH: 100 INJECTION, SOLUTION INTRAVENOUS; SUBCUTANEOUS at 12:32

## 2019-11-02 RX ADMIN — INSULIN LISPRO SCH: 100 INJECTION, SOLUTION INTRAVENOUS; SUBCUTANEOUS at 22:00

## 2019-11-02 RX ADMIN — MULTIVITAMIN TABLET SCH TAB: TABLET at 10:03

## 2019-11-02 RX ADMIN — CEFTRIAXONE SCH: 1 INJECTION, SOLUTION INTRAVENOUS at 17:34

## 2019-11-02 RX ADMIN — INSULIN LISPRO SCH: 100 INJECTION, SOLUTION INTRAVENOUS; SUBCUTANEOUS at 16:00

## 2019-11-02 RX ADMIN — Medication SCH: at 22:00

## 2019-11-02 RX ADMIN — Medication SCH ML: at 05:48

## 2019-11-02 RX ADMIN — INSULIN LISPRO SCH: 100 INJECTION, SOLUTION INTRAVENOUS; SUBCUTANEOUS at 09:20

## 2019-11-02 NOTE — PDOC DISCHARGE SUMMARY
Impression





- Admit/DC Date/PCP


Admission Date/Primary Care Provider: 


  10/27/19 18:37





  MEG MCCARTHY





Discharge Date: 11/02/19





- Discharge Diagnosis


(1) Proteus mirabilis infection


Is this a current diagnosis for this admission?: Yes   





(2) E. coli UTI (urinary tract infection)


Is this a current diagnosis for this admission?: Yes   





(3) Urinary tract infection


Is this a current diagnosis for this admission?: Yes   





(4) Diabetes mellitus type 2 in nonobese


Is this a current diagnosis for this admission?: Yes   





(5) Hypothyroidism


Is this a current diagnosis for this admission?: Yes   





(6) Multiple sclerosis, primary chronic progressive


Is this a current diagnosis for this admission?: Yes   





- Assessment


Summary: 


Patient was managed with IV Rocephin and oral Doxycycline based on her blood 

culture and urine culture findings and sensitivity report. She has history of 

allergy to sulfa drug limiting use of single antibiotic Bactrim DS. She will be 

discharged home with [facility] service including visiting nurse and physical therapy. 

She will follow up in the office as instructed upon discharge.





- Additional Information


Resuscitation Status: Full Code


Discharge Diet: As Tolerated


Discharge Activity: Activity As Tolerated, Supervised Activity


Referrals: 


MEG MCCARTHY MD [Primary Care Provider] - 11/14/19 10:00 am


Prescriptions: 


Amoxicillin/Potassium Clav [Amox-Clav 875-125 mg Tablet] 1 each PO BID #10 

tablet


Doxycycline Hyclate [Vibramycin 100 mg Tablet] 100 mg PO Q12A #10 tablet


Home Medications: 








Acetaminophen [Tylenol Extra Strength 500 mg Tablet] 1,000 mg PO Q6HP PRN 

10/28/19 


Baclofen [Baclofen 10 mg Tablet] 10 mg PO TIDP PRN 10/28/19 


Multivitamin [Multiple Vitamins] 1 tab PO DAILY 10/28/19 


Amoxicillin/Potassium Clav [Amox-Clav 875-125 mg Tablet] 1 each PO BID #10 

tablet 11/02/19 


Doxycycline Hyclate [Vibramycin 100 mg Tablet] 100 mg PO Q12A #10 tablet 

11/02/19 











History of Present Illiness


History of Present Illness: 


VI PAIGE is a 54 year old female known to my practice presented to the 

ED with family, daughter, reported change in mentation, dysuria, poor oral 

intake, dissociation in her gaze and episode of fever. Son at bedside concurred 

with reported symptoms for couple of days and patient usually present such 

symptoms with episodes of urinary tract infection. Patient has history of 

progressive multiple sclerosis and currently bedbound. Son remain primary 

caregiver at home. Her initial evaluation in the ED was significant for abnormal

urinalysis with leukocytosis suggestive of urinary tract infection. She was 

advised hospitalization for further evaluation and management. Her morbidities 

are as listed below.





Hospital Course


Hospital Course: 


Patient was managed with IV Rocephin and oral doxycycline based on her blood 

culture and urine culture findings and sensitivity report. She has history of 

allergy to sulfa drug limiting use of single antibiotic Bactrim DS. She will be 

discharged home with A service including visiting nurse and physical therapy. 

She will follow up in the office as instructed upon discharge.





Physical Exam


Vital Signs: 


                                        











Temp Pulse Resp BP Pulse Ox


 


 98.2 F   81   16   140/74 H  99 


 


 11/02/19 16:00  11/02/19 16:00  11/02/19 16:00  11/02/19 16:00  11/02/19 16:00








                                 Intake & Output











 11/01/19 11/02/19 11/03/19





 06:59 06:59 05:59


 


Intake Total 350 250 


 


Output Total 725 525 


 


Balance -375 -275 


 


Weight 86.9 kg 88.8 kg 

















General appearance: PRESENT: no acute distress


Head exam: PRESENT: atraumatic, normocephalic


Eye exam: PRESENT: conjunctiva pink.  ABSENT: pallor, scleral icterus


Ear exam: PRESENT: normal external ear exam


Mouth exam: PRESENT: moist


Respiratory exam: PRESENT: clear to auscultation bilaterally


Cardiovascular exam: PRESENT: RRR.  ABSENT: diastolic murmur, rubs, systolic 

murmur


GI/Abdominal exam: PRESENT: normal bowel sounds, soft.  ABSENT: distended, 

guarding, mass, organomegaly, rebound, tenderness


Extremities exam: ABSENT: pedal edema


Neurological exam: PRESENT: alert, awake, oriented to person, oriented to place,

oriented to time, oriented to situation


Psychiatric exam: PRESENT: appropriate affect, normal mood.  ABSENT: homicidal 

ideation, suicidal ideation


Skin exam: PRESENT: dry, warm, other - abrasion skin open and stage 2 sacral 

pressure ulcer.





Results


Laboratory Results: 


                                        











WBC  5.8 10^3/uL (4.0-10.5)   10/31/19  18:19    


 


RBC  4.31 10^6/uL (3.72-5.28)   10/31/19  18:19    


 


Hgb  11.8 g/dL (12.0-15.5)  L  10/31/19  18:19    


 


Hct  34.8 % (36.0-47.0)  L  10/31/19  18:19    


 


MCV  81 fl (80-97)   10/31/19  18:19    


 


MCH  27.4 pg (27.0-33.4)   10/31/19  18:19    


 


MCHC  33.9 g/dL (32.0-36.0)   10/31/19  18:19    


 


RDW  13.5 % (11.5-14.0)   10/31/19  18:19    


 


Plt Count  282 10^3/uL (150-450)   10/31/19  18:19    


 


Lymph % (Auto)  47.6 % (13-45)  H  10/31/19  18:19    


 


Mono % (Auto)  7.2 % (3-13)   10/31/19  18:19    


 


Eos % (Auto)  6.2 % (0-6)  H  10/31/19  18:19    


 


Baso % (Auto)  0.9 % (0-2)   10/31/19  18:19    


 


Absolute Neuts (auto)  2.2 10^3/uL (1.7-8.2)   10/31/19  18:19    


 


Absolute Lymphs (auto)  2.8 10^3/uL (0.5-4.7)   10/31/19  18:19    


 


Absolute Monos (auto)  0.4 10^3/uL (0.1-1.4)   10/31/19  18:19    


 


Absolute Eos (auto)  0.4 10^3/uL (0.0-0.6)   10/31/19  18:19    


 


Absolute Basos (auto)  0.1 10^3/uL (0.0-0.2)   10/31/19  18:19    


 


Seg Neutrophils %  38.1 % (42-78)  L  10/31/19  18:19    


 


PT  13.6 SEC (11.4-15.4)   10/27/19  19:45    


 


INR  1.04   10/27/19  19:45    


 


Sodium  144.1 mmol/L (137-145)   10/31/19  18:19    


 


Potassium  3.8 mmol/L (3.6-5.0)   10/31/19  18:19    


 


Chloride  110 mmol/L ()  H  10/31/19  18:19    


 


Carbon Dioxide  24 mmol/L (22-30)   10/31/19  18:19    


 


Anion Gap  10  (5-19)   10/31/19  18:19    


 


BUN  9 mg/dL (7-20)   10/31/19  18:19    


 


Creatinine  0.52 mg/dL (0.52-1.25)   10/31/19  18:19    


 


Est GFR ( Amer)  > 60  (>60)   10/31/19  18:19    


 


Est GFR (Non-Af Amer)  Cancelled   10/27/19  16:14    


 


Est GFR (MDRD) Non-Af  > 60  (>60)   10/31/19  18:19    


 


Glucose  87 mg/dL ()   10/31/19  18:19    


 


POC Glucose  80 mg/dL ()   11/02/19  15:24    


 


Lactic Acid  1.0 mmol/L (0.7-2.1)   10/27/19  16:14    


 


Calcium  9.9 mg/dL (8.4-10.2)   10/31/19  18:19    


 


Total Bilirubin  0.8 mg/dL (0.2-1.3)   10/27/19  17:20    


 


Direct Bilirubin  0.3 mg/dL (0.0-0.4)   10/27/19  17:20    


 


Neonat Total Bilirubin  Not Reportable   10/27/19  17:20    


 


Neonat Direct Bilirubin  Not Reportable   10/27/19  17:20    


 


Neonat Indirect Bili  Not Reportable   10/27/19  17:20    


 


AST  25 U/L (14-36)   10/27/19  17:20    


 


ALT  16 U/L (<35)   10/27/19  17:20    


 


Alkaline Phosphatase  121 U/L ()   10/27/19  17:20    


 


Total Protein  7.2 g/dL (6.3-8.2)   10/27/19  17:20    


 


Albumin  3.6 g/dL (3.5-5.0)   10/27/19  17:20    


 


EGFR   Cancelled   10/27/19  16:14    


 


Urine Color  CALVIN   10/27/19  16:30    


 


Urine Appearance  CLOUDY   10/27/19  16:30    


 


Urine pH  7.0  (5.0-9.0)   10/27/19  16:30    


 


Ur Specific Gravity  1.016   10/27/19  16:30    


 


Urine Protein  100 mg/dL (NEGATIVE)  H  10/27/19  16:30    


 


Urine Glucose (UA)  NEGATIVE mg/dL (NEGATIVE)   10/27/19  16:30    


 


Urine Ketones  TRACE mg/dL (NEGATIVE)  H  10/27/19  16:30    


 


Urine Blood  MODERATE  (NEGATIVE)  H  10/27/19  16:30    


 


Urine Nitrite (Reflex)  POSITIVE  (NEGATIVE)  H  10/27/19  16:30    


 


Urine Bilirubin  NEGATIVE  (NEGATIVE)   10/27/19  16:30    


 


Urine Urobilinogen  4.0 mg/dL (<2.0)  H  10/27/19  16:30    


 


Leukocyte Esterase Rfl  LARGE  (NEGATIVE)  H  10/27/19  16:30    


 


Urine RBC (Auto)  >182 /HPF  10/27/19  16:30    


 


Urine Bacteria (Auto)  3+ /HPF  10/27/19  16:30    


 


Urine WBC (Reflex)  > 182 /HPF  10/27/19  16:30    


 


Urine WBC Clumps  MANY /HPF  10/27/19  16:30    


 


Squamous Epi Cells Auto  10 /HPF  10/27/19  16:30    


 


Urine Mucus (Auto)  MOD /LPF  10/27/19  16:30    


 


Urine Ascorbic Acid  NEGATIVE  (NEGATIVE)   10/27/19  16:30    











Impressions: 


                                        





Chest X-Ray  10/27/19 16:18


IMPRESSION:  NO ACUTE DISEASE.


 














Plan


Health Concerns: 


Progressive multiple sclerosis with high risk of fall and pressure ulcer.


Plan of Treatment: 


Continue oral antibiotic coverage with Augmentin and Doxycycline x 5 days based 

on her culture sensitivity findings. 


Goals: 


Improve transfer function with physical therapy engagement and mobility devices.


Time Spent: Greater than 30 Minutes - care cordination and discussion of post 

hospital care plan with family.





Stroke


Is this a Stroke Patient?: No





Acute Heart Failure





- **


Is this a Heart Failure Patient?: No

## 2020-04-05 ENCOUNTER — HOSPITAL ENCOUNTER (INPATIENT)
Dept: HOSPITAL 62 - ER | Age: 55
LOS: 5 days | Discharge: HOME HEALTH | DRG: 872 | End: 2020-04-10
Attending: INTERNAL MEDICINE | Admitting: INTERNAL MEDICINE
Payer: MEDICARE

## 2020-04-05 DIAGNOSIS — Z88.2: ICD-10-CM

## 2020-04-05 DIAGNOSIS — L89.150: ICD-10-CM

## 2020-04-05 DIAGNOSIS — Z16.19: ICD-10-CM

## 2020-04-05 DIAGNOSIS — E83.42: ICD-10-CM

## 2020-04-05 DIAGNOSIS — B96.20: ICD-10-CM

## 2020-04-05 DIAGNOSIS — Z91.040: ICD-10-CM

## 2020-04-05 DIAGNOSIS — Z74.01: ICD-10-CM

## 2020-04-05 DIAGNOSIS — E11.9: ICD-10-CM

## 2020-04-05 DIAGNOSIS — R31.9: ICD-10-CM

## 2020-04-05 DIAGNOSIS — E87.6: ICD-10-CM

## 2020-04-05 DIAGNOSIS — G35: ICD-10-CM

## 2020-04-05 DIAGNOSIS — R26.9: ICD-10-CM

## 2020-04-05 DIAGNOSIS — Z86.711: ICD-10-CM

## 2020-04-05 DIAGNOSIS — A41.2: Primary | ICD-10-CM

## 2020-04-05 DIAGNOSIS — N39.0: ICD-10-CM

## 2020-04-05 DIAGNOSIS — Z91.030: ICD-10-CM

## 2020-04-05 LAB
ADD MANUAL DIFF: NO
ALBUMIN SERPL-MCNC: 4.3 G/DL (ref 3.5–5)
ALP SERPL-CCNC: 148 U/L (ref 38–126)
ANION GAP SERPL CALC-SCNC: 13 MMOL/L (ref 5–19)
APPEARANCE UR: (no result)
APTT PPP: YELLOW S
AST SERPL-CCNC: 32 U/L (ref 14–36)
BASE EXCESS BLDV CALC-SCNC: -7.7 MMOL/L
BASOPHILS # BLD AUTO: 0 10^3/UL (ref 0–0.2)
BASOPHILS NFR BLD AUTO: 0.2 % (ref 0–2)
BILIRUB DIRECT SERPL-MCNC: 0.4 MG/DL (ref 0–0.4)
BILIRUB SERPL-MCNC: 1 MG/DL (ref 0.2–1.3)
BILIRUB UR QL STRIP: NEGATIVE
BUN SERPL-MCNC: 14 MG/DL (ref 7–20)
CALCIUM: 10.9 MG/DL (ref 8.4–10.2)
CHLORIDE SERPL-SCNC: 112 MMOL/L (ref 98–107)
CO2 SERPL-SCNC: 19 MMOL/L (ref 22–30)
EOSINOPHIL # BLD AUTO: 0 10^3/UL (ref 0–0.6)
EOSINOPHIL NFR BLD AUTO: 0.2 % (ref 0–6)
ERYTHROCYTE [DISTWIDTH] IN BLOOD BY AUTOMATED COUNT: 14.5 % (ref 11.5–14)
GLUCOSE SERPL-MCNC: 201 MG/DL (ref 75–110)
GLUCOSE UR STRIP-MCNC: NEGATIVE MG/DL
HCO3 BLDV-SCNC: 16.6 MMOL/L (ref 20–32)
HCT VFR BLD CALC: 43 % (ref 36–47)
HGB BLD-MCNC: 14.6 G/DL (ref 12–15.5)
INR PPP: 1.07
KETONES UR STRIP-MCNC: (no result) MG/DL
LYMPHOCYTES # BLD AUTO: 0.6 10^3/UL (ref 0.5–4.7)
LYMPHOCYTES NFR BLD AUTO: 5.6 % (ref 13–45)
MCH RBC QN AUTO: 27.9 PG (ref 27–33.4)
MCHC RBC AUTO-ENTMCNC: 34 G/DL (ref 32–36)
MCV RBC AUTO: 82 FL (ref 80–97)
MONOCYTES # BLD AUTO: 0.1 10^3/UL (ref 0.1–1.4)
MONOCYTES NFR BLD AUTO: 0.8 % (ref 3–13)
NEUTROPHILS # BLD AUTO: 9.6 10^3/UL (ref 1.7–8.2)
NEUTS SEG NFR BLD AUTO: 93.2 % (ref 42–78)
PCO2 BLDV: 30.5 MMHG (ref 35–63)
PH BLDV: 7.35 [PH] (ref 7.3–7.42)
PH UR STRIP: 7 [PH] (ref 5–9)
PLATELET # BLD: 338 10^3/UL (ref 150–450)
POTASSIUM SERPL-SCNC: 4.7 MMOL/L (ref 3.6–5)
PROT SERPL-MCNC: 8.5 G/DL (ref 6.3–8.2)
PROT UR STRIP-MCNC: 100 MG/DL
PROTHROMBIN TIME: 13.9 SEC (ref 11.4–15.4)
RBC # BLD AUTO: 5.25 10^6/UL (ref 3.72–5.28)
SP GR UR STRIP: 1.01
TOTAL CELLS COUNTED % (AUTO): 100 %
UROBILINOGEN UR-MCNC: NEGATIVE MG/DL (ref ?–2)
WBC # BLD AUTO: 10.2 10^3/UL (ref 4–10.5)

## 2020-04-05 PROCEDURE — 99292 CRITICAL CARE ADDL 30 MIN: CPT

## 2020-04-05 PROCEDURE — 71045 X-RAY EXAM CHEST 1 VIEW: CPT

## 2020-04-05 PROCEDURE — 96361 HYDRATE IV INFUSION ADD-ON: CPT

## 2020-04-05 PROCEDURE — 87086 URINE CULTURE/COLONY COUNT: CPT

## 2020-04-05 PROCEDURE — 80048 BASIC METABOLIC PNL TOTAL CA: CPT

## 2020-04-05 PROCEDURE — 93010 ELECTROCARDIOGRAM REPORT: CPT

## 2020-04-05 PROCEDURE — 36415 COLL VENOUS BLD VENIPUNCTURE: CPT

## 2020-04-05 PROCEDURE — 83605 ASSAY OF LACTIC ACID: CPT

## 2020-04-05 PROCEDURE — 80053 COMPREHEN METABOLIC PANEL: CPT

## 2020-04-05 PROCEDURE — 96365 THER/PROPH/DIAG IV INF INIT: CPT

## 2020-04-05 PROCEDURE — 93005 ELECTROCARDIOGRAM TRACING: CPT

## 2020-04-05 PROCEDURE — 87186 SC STD MICRODIL/AGAR DIL: CPT

## 2020-04-05 PROCEDURE — 83735 ASSAY OF MAGNESIUM: CPT

## 2020-04-05 PROCEDURE — 85025 COMPLETE CBC W/AUTO DIFF WBC: CPT

## 2020-04-05 PROCEDURE — 87040 BLOOD CULTURE FOR BACTERIA: CPT

## 2020-04-05 PROCEDURE — 99291 CRITICAL CARE FIRST HOUR: CPT

## 2020-04-05 PROCEDURE — 96375 TX/PRO/DX INJ NEW DRUG ADDON: CPT

## 2020-04-05 PROCEDURE — 82803 BLOOD GASES ANY COMBINATION: CPT

## 2020-04-05 PROCEDURE — 87150 DNA/RNA AMPLIFIED PROBE: CPT

## 2020-04-05 PROCEDURE — 85610 PROTHROMBIN TIME: CPT

## 2020-04-05 PROCEDURE — 81001 URINALYSIS AUTO W/SCOPE: CPT

## 2020-04-05 PROCEDURE — 87088 URINE BACTERIA CULTURE: CPT

## 2020-04-05 PROCEDURE — 82962 GLUCOSE BLOOD TEST: CPT

## 2020-04-05 PROCEDURE — 87077 CULTURE AEROBIC IDENTIFY: CPT

## 2020-04-05 PROCEDURE — 51702 INSERT TEMP BLADDER CATH: CPT

## 2020-04-05 RX ADMIN — CEFTRIAXONE SCH MLS/HR: 1 INJECTION, SOLUTION INTRAVENOUS at 12:25

## 2020-04-05 RX ADMIN — INSULIN LISPRO SCH: 100 INJECTION, SOLUTION INTRAVENOUS; SUBCUTANEOUS at 15:38

## 2020-04-05 RX ADMIN — SODIUM CHLORIDE PRN MLS/HR: 9 INJECTION, SOLUTION INTRAVENOUS at 13:00

## 2020-04-05 RX ADMIN — INSULIN LISPRO SCH: 100 INJECTION, SOLUTION INTRAVENOUS; SUBCUTANEOUS at 22:15

## 2020-04-05 NOTE — RADIOLOGY REPORT (SQ)
EXAM DESCRIPTION: 



XR CHEST 1 VIEW



COMPLETED DATE/TME:  04/05/2020 03:08



CLINICAL HISTORY: dyspnea



COMPARISON: 10/27/2019



FINDINGS: Single frontal view of the chest. 



Cardiomediastinal silhouette: Normal size and contour. 

Lungs: No consolidation, pneumothorax, or pleural effusion. 

Bones: No acute osseous abnormality. Leads overlie the chest.

Upper abdomen: No abnormality identified.



IMPRESSION:



1. No acute pulmonary process identified.

## 2020-04-05 NOTE — PDOC H&P
History of Present Illness


Admission Date/PCP: 


  20 06:56





  MEG SHARI





History of Present Illness: 


VI PAIGE is a 54 year old female known to my practice who was brought 

to the ED via EMS with family reporting fever, coughing, and difficulty with 

breathing. Patient's account was more of her daughter's claim that she was 

acting funny with funny eye movement. She denied any fever or chills. EMS staff 

reported incident of possible aspiration with desaturation and need for 

oropharyngeal suction to improve her oxygenation status en route to the ED. She 

was on supplemental oxygen at 3L/minute via nasal canula upon arrival in the ED.

Her initial evaluation was significant for abnormal urinalysis suggestive of UTI

with hypotension. She was treatment with IV fluid infusion and antibiotic 

coverage. SHe was advised hospitalization for further evaluation and management.

Her morbidities are as listed below.





Past Medical History


Cardiac Medical History: Reports: Pulmonary Embolism - 2 years ago


   Denies: Atrial Fibrillation, Congestive Heart Failure, Myocardial Infarction,

Hyperlipidema, Hypertension


Pulmonary Medical History: 


   Denies: Asthma, Bronchitis, Chronic Obstructive Pulmonary Disease (COPD), Pn

eumonia, Respiratory Failure, Sleep Apnea, Tuberculosis


Neurological Medical History: 


   Denies: Migraine, Seizures


Endocrine Medical History: Reports: Diabetes Mellitus Type 2, Hypothyroidism


   Denies: Diabetes Mellitus Type 1


Renal/ Medical History: 


   Denies: End Stage Renal Disease


Malignancy Medical History: 


   Denies: Leukemia, Lung Cancer


GI Medical History: 


   Denies: Gastroesophageal Reflux Disease, Hiatal Hernia


Musculoskeltal Medical History: 


   Denies: Arthritis


Psychiatric Medical History: 


   Denies: Attention Deficit Hyperactivity Disorder, Bipolar Disorder, Dementia,

Depression


Hematology: Reports: Anemia


   Denies: Hemophilia, Sickle Cell Disease


Infectious Medical History: 


   Denies: HIV





Past Surgical History


Past Surgical History: Reports:  Section - x2, Cholecystectomy


   Denies: Appendectomy, Coronary Artery Bypass Graft, Gastric Bypass Surgery, 

Herniorrhaphy, Hysterectomy, Mastectomy, Pacemaker, Tonsillectomy, Tubal 

Ligation





Social History


Lives with: Family


Smoking Status: Never Smoker


Electronic Cigarette use?: No


Frequency of Alcohol Use: None


Hx Recreational Drug Use: No


Drugs: None


Hx Prescription Drug Abuse: No





- Advance Directive


Resuscitation Status: Full Code





Family History


Family History: Reviewed & Not Pertinent


Parental Family History Reviewed: Yes


Children Family History Reviewed: Yes


Sibling(s) Family History Reviewed.: Yes





Medication/Allergy


Home Medications: 








No Home Medications  20 








Allergies/Adverse Reactions: 


                                        





latex [Latex] Allergy (Intermediate, Verified 20 04:59)


   RASH/BLISTER


Sulfa (Sulfonamide Antibiotics) Allergy (Intermediate, Verified 20 04:59)


   RASH/HIVES


bee venom protein (honey bee) Allergy (Verified 20 09:14)


   Anaphylaxis











Review of Systems


Constitutional: PRESENT: fever(s).  ABSENT: chills, headache(s), weight gain, 

weight loss


Eyes: ABSENT: visual disturbances


Ears: ABSENT: hearing changes


Cardiovascular: ABSENT: chest pain, dyspnea on exertion, edema, orthropnea, 

palpitations


Respiratory: ABSENT: cough, hemoptysis


Gastrointestinal: ABSENT: abdominal pain, constipation, diarrhea, hematemesis, 

hematochezia, nausea, vomiting


Genitourinary: ABSENT: dysuria, hematuria


Musculoskeletal: ABSENT: joint swelling


Integumentary: ABSENT: rash, wounds


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  ABSENT: 

abnormal speech, confusion, dizziness, syncope


Psychiatric: ABSENT: anxiety, depression, homidical ideation, suicidal ideation


Endocrine: ABSENT: cold intolerance, heat intolerance, menstrual abnormalities, 

polydipsia, polyuria


Hematologic/Lymphatic: ABSENT: easy bleeding, easy bruising, lymphadenopathy





Physical Exam


Vital Signs: 


                                        











Temp Pulse Resp BP Pulse Ox


 


 98.3 F   89   18   110/67   100 


 


 20 16:00  20 16:00  20 16:00  20 16:00  20 16:00








                                 Intake & Output











 20





 06:59 06:59 06:59


 


Intake Total   2820


 


Output Total   400


 


Balance   2420


 


Weight  75.6 kg 75.4 kg











General appearance: PRESENT: no acute distress, well-developed, well-nourished


Head exam: PRESENT: atraumatic, normocephalic


Eye exam: PRESENT: conjunctiva pink, EOMI, PERRLA.  ABSENT: scleral icterus


Ear exam: PRESENT: normal external ear exam


Mouth exam: PRESENT: moist, tongue midline


Neck exam: PRESENT: full ROM.  ABSENT: carotid bruit, JVD, lymphadenopathy, 

thyromegaly


Respiratory exam: PRESENT: chest wall tenderness, decreased breath sounds - at 

lung bases


Cardiovascular exam: PRESENT: RRR, +S1, +S2.  ABSENT: diastolic murmur, rubs, 

systolic murmur


Vascular exam: PRESENT: normal capillary refill.  ABSENT: pallor


GI/Abdominal exam: PRESENT: normal bowel sounds, soft.  ABSENT: distended, 

guarding, mass, organolmegaly, rebound, tenderness


Rectal exam: PRESENT: deferred


Musculoskeletal exam: PRESENT: deformity - related to contracture deformity from

 her multiople sclerosis


Neurological exam: PRESENT: alert, awake, oriented to person, oriented to place,

 oriented to time, oriented to situation, CN II-XII grossly intact.  ABSENT: mot

or sensory deficit


Psychiatric exam: PRESENT: appropriate affect, normal mood.  ABSENT: homicidal 

ideation, suicidal ideation


Skin exam: PRESENT: dry, intact, warm.  ABSENT: cyanosis, rash





Results


Laboratory Results: 


                                        





                                 20 02:58 





                                 20 02:58 





                                        











  20





  02:58 02:58 02:58


 


WBC  10.2  


 


RBC  5.25  


 


Hgb  14.6  


 


Hct  43.0  


 


MCV  82  


 


MCH  27.9  


 


MCHC  34.0  


 


RDW  14.5 H  


 


Plt Count  338  


 


Seg Neutrophils %  93.2 H  


 


VBG pH   


 


VBG pCO2   


 


VBG HCO3   


 


VBG Base Excess   


 


Sodium   143.5 


 


Potassium   4.7 


 


Chloride   112 H 


 


Carbon Dioxide   19 L 


 


Anion Gap   13 


 


BUN   14 


 


Creatinine   0.80 


 


Est GFR ( Amer)   > 60 


 


Glucose   201 H 


 


Lactic Acid    2.3 H


 


Calcium   10.9 H 


 


Total Bilirubin   1.0 


 


AST   32 


 


Alkaline Phosphatase   148 H 


 


Total Protein   8.5 H 


 


Albumin   4.3 


 


Urine Color   


 


Urine Appearance   


 


Urine pH   


 


Ur Specific Gravity   


 


Urine Protein   


 


Urine Glucose (UA)   


 


Urine Ketones   


 


Urine Blood   


 


Urine RBC (Auto)   














  20





  03:08 03:51 06:30


 


WBC   


 


RBC   


 


Hgb   


 


Hct   


 


MCV   


 


MCH   


 


MCHC   


 


RDW   


 


Plt Count   


 


Seg Neutrophils %   


 


VBG pH   7.35 


 


VBG pCO2   30.5 L 


 


VBG HCO3   16.6 L 


 


VBG Base Excess   -7.7 


 


Sodium   


 


Potassium   


 


Chloride   


 


Carbon Dioxide   


 


Anion Gap   


 


BUN   


 


Creatinine   


 


Est GFR (African Amer)   


 


Glucose   


 


Lactic Acid    2.5 H


 


Calcium   


 


Total Bilirubin   


 


AST   


 


Alkaline Phosphatase   


 


Total Protein   


 


Albumin   


 


Urine Color  YELLOW  


 


Urine Appearance  TURBID  


 


Urine pH  7.0  


 


Ur Specific Quinter  1.011  


 


Urine Protein  100 H  


 


Urine Glucose (UA)  NEGATIVE  


 


Urine Ketones  TRACE H  


 


Urine Blood  MODERATE H  


 


Urine RBC (Auto)  134  














  20





  09:45


 


WBC 


 


RBC 


 


Hgb 


 


Hct 


 


MCV 


 


MCH 


 


MCHC 


 


RDW 


 


Plt Count 


 


Seg Neutrophils % 


 


VBG pH 


 


VBG pCO2 


 


VBG HCO3 


 


VBG Base Excess 


 


Sodium 


 


Potassium 


 


Chloride 


 


Carbon Dioxide 


 


Anion Gap 


 


BUN 


 


Creatinine 


 


Est GFR (African Amer) 


 


Glucose 


 


Lactic Acid  3.3 H


 


Calcium 


 


Total Bilirubin 


 


AST 


 


Alkaline Phosphatase 


 


Total Protein 


 


Albumin 


 


Urine Color 


 


Urine Appearance 


 


Urine pH 


 


Ur Specific Gravity 


 


Urine Protein 


 


Urine Glucose (UA) 


 


Urine Ketones 


 


Urine Blood 


 


Urine RBC (Auto) 











Impressions: 


                                        





Chest X-Ray  20 03:08


IMPRESSION:


 


1. No acute pulmonary process identified.


 














Assessment & Plan





- Diagnosis


(1) Urinary tract infection


Qualifiers: 


   Urinary tract infection type: site unspecified   Hematuria presence: with 

hematuria   Qualified Code(s): N39.0 - Urinary tract infection, site not 

specified; R31.9 - Hematuria, unspecified   


Is this a current diagnosis for this admission?: Yes   


Plan: 


See admitting attending physician orders for details about care plan.








(2) Diabetes mellitus type 2 in nonobese


Is this a current diagnosis for this admission?: Yes   


Plan: 


See admitting attending physician orders for details about care plan.








(3) Multiple sclerosis, primary chronic progressive


Is this a current diagnosis for this admission?: Yes   


Plan: 


See admitting attending physician orders for details about care plan.








(4) Gait abnormality


Is this a current diagnosis for this admission?: Yes   


Plan: 


See admitting attending physician orders for details about care plan.








- Time


Time Spent: 50 to 70 Minutes


Medications reviewed and adjusted accordingly: Yes


Anticipated discharge: Home with Homehealth


Within: Other





- Inpatient Certification


Based on my medical assessment, after consideration of the patient's 

comorbidities, presenting symptoms, or acuity I expect that the services needed 

warrant INPATIENT care.: Yes


I certify that my determination is in accordance with my understanding of 

Medicare's requirements for reasonable and necessary INPATIENT services [42 CFR 

412.3e].: Yes


Medical Necessity: Significant Comorbidiites Make Outpatient Treatment Too 

Risky, Need Close Monitoring Due to Risk of Patient Decompensation, Need For IV 

Fluids, Need For Continuous Telemetry Monitoring, Need for IV Antibiotics, Risk 

of Complication if Not Cared For in Hospital, Risk of Diagnosis Which Will 

Require Inpatient Eval/Care/Monitoring


Post Hospital Care: D/C Planner Documentation





- Plan Summary


Plan Summary: 





See admitting attending physician orders for details about care plan.

## 2020-04-05 NOTE — EKG REPORT
SEVERITY:- ABNORMAL ECG -

SINUS TACHYCARDIA

REPOLARIZATION ABNORMALITY, PROB RATE RELATED

:

Confirmed by: Ale Price 05-Apr-2020 10:55:51

## 2020-04-05 NOTE — ER DOCUMENT REPORT
Doctor's Note


Notes: 





4/5/2020 06:40


Spoke with Dr. Chavarria, patient accepted for admission to telemetry floor.  

Chart updated.  Dr. Montana aware.

## 2020-04-05 NOTE — ER DOCUMENT REPORT
Entered by EMILIE HAMLIN SCRIBE  20 0312 





Acting as scribe for:ABDI BLANCAS IV, MD





ED General





<APURVAJOY PK - Last Filed: 20 06:54>





- General


Mode of Arrival: Medic


Information source: Emergency Med Personnel


TRAVEL OUTSIDE OF THE [place] IN LAST 30 DAYS: No





<ABDI BLANCAS IV - Last Filed: 20 07:57>





- General


Chief Complaint: Fever


Stated Complaint: FEVER


Time Seen by Provider: 20 03:02


Notes: 





This 54 year old female patient with a history of multiple sclerosis, type 2 

diabetes, hypothyroidism, and pulmonary embolism x2 years ago brought in by EMS 

presents to the ED today with complaints of fever and cough. Per EMS, they were 

originally called out by the patient's family for respiratory distress. EMS 

reports that the patient denies dyspnea when asked about her respiratory 

distress. EMS states that patient almost vomited en route and was making a "g

lugging" sound, so they suctioned the oropharynx. EMS notes that the patient was

initially 98% on RA, but after she started coughing, she dropped down to 95%, so

they placed her on 3L supplemental O2 via NC. (ABDI BLANCAS IV)





- Related Data


Allergies/Adverse Reactions: 


                                        





latex [Latex] Allergy (Intermediate, Verified 20 04:59)


   RASH/BLISTER


Sulfa (Sulfonamide Antibiotics) Allergy (Intermediate, Verified 20 04:59)


   RASH/HIVES











Past Medical History





- General


Information source: Emergency Med Personnel, [facility]





- Social History


Smoking Status: Never Smoker


Cigarette use (# per day): No


Chew tobacco use (# tins/day): No


Smoking Education Provided: No


Frequency of alcohol use: None


Drug Abuse: None


Lives with: Family


Family History: Reviewed & Not Pertinent


Patient has suicidal ideation: No


Patient has homicidal ideation: No





- Past Medical History


Cardiac Medical History: Reports: Hx Pulmonary Embolism - 2 years ago


Endocrine Medical History: Reports: Hx Diabetes Mellitus Type 2, Hx 

Hypothyroidism


Musculoskeletal Medical History: Reports Hx Multiple Sclerosis


Past Surgical History: Reports: Hx  Section - x2, Hx Cholecystectomy





- Immunizations


Immunizations up to date: Yes


Hx Diphtheria, Pertussis, Tetanus Vaccination: Yes





<ABDI BLANCAS IV - Last Filed: 20 07:57>





Review of Systems





- Review of Systems


Constitutional: See HPI, Fever


EENT: No symptoms reported


Cardiovascular: See HPI.  denies: Dyspnea


Respiratory: See HPI, Cough, Sputum, Other - Chest congestion


Gastrointestinal: No symptoms reported


Genitourinary: No symptoms reported


Female Genitourinary: No symptoms reported


Musculoskeletal: No symptoms reported


Skin: No symptoms reported


Hematologic/Lymphatic: No symptoms reported


Neurological/Psychological: No symptoms reported


-: Yes All other systems reviewed and negative





<ABDI BLANCAS IV - Last Filed: 20 07:57>





Physical Exam





- General


General appearance: Alert





- HEENT


Head: Normocephalic, Atraumatic


Eyes: Normal


Pupils: PERRL





- Respiratory


Respiratory status: Respiratory distress


Chest status: Nontender


Breath sounds: Rhonchi


Chest palpation: Normal





- Cardiovascular


Rhythm: Regular, Tachycardia


Heart sounds: Normal auscultation


Murmur: No


Friction rub: No


Gallop: None auscultated





- Abdominal


Inspection: Normal


Distension: No distension


Bowel sounds: Normal


Tenderness: Nontender - Abdomen soft


Organomegaly: No organomegaly





- Back


Back: Normal, Nontender





- Extremities


General upper extremity: Normal inspection


General lower extremity: Normal inspection





- Neurological


Neuro grossly intact: Yes





- Psychological


Associated symptoms: Normal affect, Normal mood





- Skin


Skin Temperature: Warm


Skin Moisture: Dry


Skin Color: Normal





<ABDI BLANCAS IV - Last Filed: 20 07:57>





- Vital signs


Vitals: 


                                        











Resp Pulse Ox


 


 39 H  96 


 


 20 02:55  20 02:55














Course





- Laboratory


Result Diagrams: 


                                 20 02:58





                                 20 02:58





<JOY MIXON - Last Filed: 20 06:54>





- Laboratory


Result Diagrams: 


                                 20 02:58





                                 20 02:58





- Consults


  ** yessi


Time consulted: 06:40


Consulted provider: will see as inpatient





<ABDI BLANCAS IV - Last Filed: 20 07:57>





- Vital Signs


Vital signs: 


                                        











Temp Pulse Resp BP Pulse Ox


 


 100.4 F      25 H  107/63   96 


 


 20 04:36     20 07:31  20 07:31  20 07:31














- Laboratory


Laboratory results interpreted by me: 


                                        











  20





  02:58 02:58 02:58


 


RDW  14.5 H  


 


Lymph % (Auto)  5.6 L  


 


Mono % (Auto)  0.8 L  


 


Absolute Neuts (auto)  9.6 H  


 


Seg Neutrophils %  93.2 H  


 


VBG pCO2   


 


VBG HCO3   


 


Chloride   112 H 


 


Carbon Dioxide   19 L 


 


Glucose   201 H 


 


POC Glucose   


 


Lactic Acid    2.3 H


 


Calcium   10.9 H 


 


Alkaline Phosphatase   148 H 


 


Total Protein   8.5 H 


 


Urine Protein   


 


Urine Ketones   


 


Urine Blood   


 


Urine Nitrite (Reflex)   


 


Leukocyte Esterase Rfl   














  20





  03:08 03:28 03:51


 


RDW   


 


Lymph % (Auto)   


 


Mono % (Auto)   


 


Absolute Neuts (auto)   


 


Seg Neutrophils %   


 


VBG pCO2    30.5 L


 


VBG HCO3    16.6 L


 


Chloride   


 


Carbon Dioxide   


 


Glucose   


 


POC Glucose   184 H 


 


Lactic Acid   


 


Calcium   


 


Alkaline Phosphatase   


 


Total Protein   


 


Urine Protein  100 H  


 


Urine Ketones  TRACE H  


 


Urine Blood  MODERATE H  


 


Urine Nitrite (Reflex)  POSITIVE H  


 


Leukocyte Esterase Rfl  LARGE H  














  20





  06:30


 


RDW 


 


Lymph % (Auto) 


 


Mono % (Auto) 


 


Absolute Neuts (auto) 


 


Seg Neutrophils % 


 


VBG pCO2 


 


VBG HCO3 


 


Chloride 


 


Carbon Dioxide 


 


Glucose 


 


POC Glucose 


 


Lactic Acid  2.5 H


 


Calcium 


 


Alkaline Phosphatase 


 


Total Protein 


 


Urine Protein 


 


Urine Ketones 


 


Urine Blood 


 


Urine Nitrite (Reflex) 


 


Leukocyte Esterase Rfl 














- EKG Interpretation by Me


Additional EKG results interpreted by me: 





20 04:42


EKG obtained on 2020 at 0257 hrs. was interpreted by this MD.  Findings: 

Sinus tachycardia with a rate of 158, normal axis, ST segments are nonspecific. 

 Impression sinus tachycardia with nonspecific ST segments. (ABDI BLANCAS IV)





- Consults


  ** yessi


Reason for consultation: 





20 07:56


sepsis (ABDI BLANCAS IV)





Critical Care Note





- Critical Care Note


Total time excluding time spent on procedures (mins): 90





<ABDI BLANCAS IV - Last Filed: 20 07:57>





Discharge





- Discharge


Admitting Provider: Yessi


Unit Admitted: Telemetry





<JOY MIXON - Last Filed: 20 06:54>





- Discharge


Admitting Provider: Yessi


Unit Admitted: Telemetry





<ABDI BALNCAS IV - Last Filed: 20 07:57>





- Discharge


Clinical Impression: 


Sepsis


Qualifiers:


 Sepsis type: sepsis due to unspecified organism Sepsis acute organ dysfunction 

status: unspecified Qualified Code(s): A41.9 - Sepsis, unspecified organism





Urinary tract infection


Qualifiers:


 Urinary tract infection type: site unspecified Hematuria presence: with 

hematuria Qualified Code(s): N39.0 - Urinary tract infection, site not 

specified; R31.9 - Hematuria, unspecified





Condition: Fair


Disposition: ADMITTED AS INPATIENT





I personally performed the services described in the documentation, reviewed and

 edited the documentation which was dictated to the scribe in my presence, and 

it accurately records my words and actions.

## 2020-04-06 LAB
ADD MANUAL DIFF: NO
ALBUMIN SERPL-MCNC: 2.5 G/DL (ref 3.5–5)
ALP SERPL-CCNC: 93 U/L (ref 38–126)
ANION GAP SERPL CALC-SCNC: 5 MMOL/L (ref 5–19)
AST SERPL-CCNC: 28 U/L (ref 14–36)
BASOPHILS # BLD AUTO: 0 10^3/UL (ref 0–0.2)
BASOPHILS NFR BLD AUTO: 0.5 % (ref 0–2)
BILIRUB DIRECT SERPL-MCNC: 0.1 MG/DL (ref 0–0.4)
BILIRUB SERPL-MCNC: 0.7 MG/DL (ref 0.2–1.3)
BUN SERPL-MCNC: 14 MG/DL (ref 7–20)
CALCIUM: 8.8 MG/DL (ref 8.4–10.2)
CHLORIDE SERPL-SCNC: 114 MMOL/L (ref 98–107)
CO2 SERPL-SCNC: 19 MMOL/L (ref 22–30)
EOSINOPHIL # BLD AUTO: 0.1 10^3/UL (ref 0–0.6)
EOSINOPHIL NFR BLD AUTO: 1.8 % (ref 0–6)
ERYTHROCYTE [DISTWIDTH] IN BLOOD BY AUTOMATED COUNT: 14.5 % (ref 11.5–14)
GLUCOSE SERPL-MCNC: 109 MG/DL (ref 75–110)
HCT VFR BLD CALC: 32.4 % (ref 36–47)
HGB BLD-MCNC: 11 G/DL (ref 12–15.5)
LYMPHOCYTES # BLD AUTO: 0.8 10^3/UL (ref 0.5–4.7)
LYMPHOCYTES NFR BLD AUTO: 10.2 % (ref 13–45)
MCH RBC QN AUTO: 27.7 PG (ref 27–33.4)
MCHC RBC AUTO-ENTMCNC: 33.9 G/DL (ref 32–36)
MCV RBC AUTO: 82 FL (ref 80–97)
MONOCYTES # BLD AUTO: 0.5 10^3/UL (ref 0.1–1.4)
MONOCYTES NFR BLD AUTO: 6.6 % (ref 3–13)
NEUTROPHILS # BLD AUTO: 6.5 10^3/UL (ref 1.7–8.2)
NEUTS SEG NFR BLD AUTO: 80.9 % (ref 42–78)
PLATELET # BLD: 180 10^3/UL (ref 150–450)
POTASSIUM SERPL-SCNC: 3.6 MMOL/L (ref 3.6–5)
PROT SERPL-MCNC: 5.6 G/DL (ref 6.3–8.2)
RBC # BLD AUTO: 3.97 10^6/UL (ref 3.72–5.28)
TOTAL CELLS COUNTED % (AUTO): 100 %
WBC # BLD AUTO: 8 10^3/UL (ref 4–10.5)

## 2020-04-06 RX ADMIN — INSULIN LISPRO SCH: 100 INJECTION, SOLUTION INTRAVENOUS; SUBCUTANEOUS at 12:24

## 2020-04-06 RX ADMIN — INSULIN LISPRO SCH: 100 INJECTION, SOLUTION INTRAVENOUS; SUBCUTANEOUS at 08:30

## 2020-04-06 RX ADMIN — SODIUM CHLORIDE PRN MLS/HR: 9 INJECTION, SOLUTION INTRAVENOUS at 19:43

## 2020-04-06 RX ADMIN — INSULIN LISPRO SCH: 100 INJECTION, SOLUTION INTRAVENOUS; SUBCUTANEOUS at 16:18

## 2020-04-06 RX ADMIN — CEFTRIAXONE SCH MLS/HR: 1 INJECTION, SOLUTION INTRAVENOUS at 13:17

## 2020-04-06 RX ADMIN — ACETAMINOPHEN PRN MG: 325 TABLET ORAL at 01:07

## 2020-04-06 RX ADMIN — SODIUM CHLORIDE PRN MLS/HR: 9 INJECTION, SOLUTION INTRAVENOUS at 09:47

## 2020-04-06 RX ADMIN — INSULIN LISPRO SCH: 100 INJECTION, SOLUTION INTRAVENOUS; SUBCUTANEOUS at 21:53

## 2020-04-06 NOTE — CDI QUERY
CDI Query


CDI Review: 





Dear Provider:


 To better reflect your patients severity of illness, morbidity, and resource 


utilization    


 Please specify and document in the Progress Notes and Discharge Summary        


                                                                   if you are 

monitoring / treating / evaluating any of the following conditions:











                                             Query                              

    Clinical indicators                       


 


Sepsis 2/2 to Staphylococcus 





Staphylococcus Bacteremia





Sepsis ruled out





Unable to determine





Other














 Per ED Notes:


presents to the ED today with complaints of fever and cough. Per EMS, they 

were originally called out by the patient's family for respiratory distress.





Clinical Impression: 


Sepsis


Qualifiers:


 Sepsis type: sepsis due to unspecified organism Sepsis acute organ dysfunction 

status: unspecified Qualified Code(s): A41.9 - Sepsis, unspecified organism


 


Temp 


  


 Resp


 BP 


O2 sats 





 100.4 F 


  


 25 H


 107/63 


 96 





Blood cultures: Staphylococcus Species


 


 


 


 








  The terms probable, suspected, likely, possible or still to be ruled 

out may be used if you are unable to determine the exact nature of a condition.


Thank you for your consideration,


                    Clinical Documentation Physician Advisors





                                          FARIBA Hector RN, BSN RN [email or web address]                 

                                        [email or web address] 


                  Office [phone]        Cell [phone]                  

   Office [phone]        Cell [phone]

## 2020-04-06 NOTE — PDOC PROGRESS REPORT
Subjective


Progress Note for:: 04/06/20


Subjective:: 





Patient expressed concern about restart of her Baclofen. She denied any fever or

chills. No chest pain or difficulty with breathing. No nausea, vomiting, or 

abdominal pain. Tolerating oral feeding.


Reason For Visit: 


SEPSIS,URINARY TRACT INFECTION








Physical Exam


Vital Signs: 


                                        











Temp Pulse Resp BP Pulse Ox


 


 97.7 F   109 H  16   102/63   98 


 


 04/06/20 07:52  04/06/20 07:52  04/06/20 07:52  04/06/20 07:52  04/06/20 07:52








                                 Intake & Output











 04/05/20 04/06/20 04/07/20





 06:59 06:59 06:59


 


Intake Total  3920 


 


Output Total  950 


 


Balance  2970 


 


Weight 75.6 kg 82 kg 











Physical Exam: 





General appearance: PRESENT: no acute distress, well-developed, well-nourished


Head exam: PRESENT: atraumatic, normocephalic


Eye exam: PRESENT: conjunctiva pink  ABSENT: pallor, scleral icterus


Ear exam: PRESENT: normal external ear exam


Mouth exam: PRESENT: moist, tongue midline


Respiratory exam: PRESENT: chest wall tenderness, decreased breath sounds - at 

lung bases


Cardiovascular exam: PRESENT: RRR, +S1, +S2.  ABSENT: diastolic murmur, rubs, 

systolic murmur


GI/Abdominal exam: PRESENT: normal bowel sounds, soft.  ABSENT: distended, 

guarding, mass, organomegaly, rebound, tenderness


Musculoskeletal exam: PRESENT: deformity - related to contracture deformity from

her multiple sclerosis


Neurological exam: PRESENT: alert, awake, oriented to person, oriented to place,

oriented to time, oriented to situation, CN II-XII grossly intact.  ABSENT: 

motor sensory deficit


Psychiatric exam: PRESENT: appropriate affect, normal mood.  ABSENT: homicidal 

ideation, suicidal ideation


Skin exam: PRESENT: dry, intact, warm.  ABSENT: cyanosis, rash








Results


Laboratory Results: 


                                        





                                 04/05/20 02:58 





                                 04/05/20 02:58 





                                        





04/05/20 02:58   Blood   Blood Culture (PCR) - Final


                            Staphylococcus Species








Impressions: 


                                        





Chest X-Ray  04/05/20 03:08


IMPRESSION:


 


1. No acute pulmonary process identified.


 














Assessment & Plan





- Diagnosis


(1) Staphylococcus septicemia


Is this a current diagnosis for this admission?: Yes   


Plan: 


Follow up on organism final identification and sensitivity. Continue IV Rocephin

coverage.








(2) Urinary tract infection


Qualifiers: 


   Urinary tract infection type: site unspecified   Hematuria presence: with 

hematuria   Qualified Code(s): N39.0 - Urinary tract infection, site not 

specified; R31.9 - Hematuria, unspecified   


Is this a current diagnosis for this admission?: Yes   


Plan: 


Obtain urine culture. Specimen was reported outside of 24 hours limit for same 

specimen culture. Obtain new specimen for urine culture.








(3) Diabetes mellitus type 2 in nonobese


Is this a current diagnosis for this admission?: Yes   





(4) Multiple sclerosis, primary chronic progressive


Is this a current diagnosis for this admission?: Yes   





(5) Gait abnormality


Is this a current diagnosis for this admission?: Yes   





- Time


Time Spent with patient: 25-34 minutes


Level of Care: TELE


Medications reviewed and adjusted accordingly: Yes


Anticipated discharge: Home with Homehealth


Within: Other





- Inpatient Certification


Based on my medical assessment, after consideration of the patient's 

comorbidities, presenting symptoms, or acuity I expect that the services needed 

warrant INPATIENT care.: Yes


I certify that my determination is in accordance with my understanding of 

Medicare's requirements for reasonable and necessary INPATIENT services [42 CFR 

412.3e].: Yes


Medical Necessity: Significant Comorbidiites Make Outpatient Treatment Too 

Risky, Need Close Monitoring Due to Risk of Patient Decompensation, Need For IV 

Fluids, Need For Continuous Telemetry Monitoring, Need for IV Antibiotics, Risk 

of Complication if Not Cared For in Hospital, Risk of Diagnosis Which Will 

Require Inpatient Eval/Care/Monitoring


Post Hospital Care: D/C Planner Documentation





- Plan Summary


Plan Summary: 





Continue IV Rocephin coverage. follow up on blood and urine culture findings.

## 2020-04-07 RX ADMIN — ACETAMINOPHEN PRN MG: 325 TABLET ORAL at 19:32

## 2020-04-07 RX ADMIN — SODIUM CHLORIDE PRN MLS/HR: 9 INJECTION, SOLUTION INTRAVENOUS at 06:00

## 2020-04-07 RX ADMIN — INSULIN LISPRO SCH: 100 INJECTION, SOLUTION INTRAVENOUS; SUBCUTANEOUS at 17:16

## 2020-04-07 RX ADMIN — INSULIN LISPRO SCH: 100 INJECTION, SOLUTION INTRAVENOUS; SUBCUTANEOUS at 21:44

## 2020-04-07 RX ADMIN — INSULIN LISPRO SCH: 100 INJECTION, SOLUTION INTRAVENOUS; SUBCUTANEOUS at 11:16

## 2020-04-07 RX ADMIN — SODIUM CHLORIDE PRN MLS/HR: 9 INJECTION, SOLUTION INTRAVENOUS at 19:02

## 2020-04-07 RX ADMIN — INSULIN LISPRO SCH: 100 INJECTION, SOLUTION INTRAVENOUS; SUBCUTANEOUS at 07:24

## 2020-04-07 RX ADMIN — DOXYCYCLINE HYCLATE SCH MG: 100 TABLET ORAL at 21:47

## 2020-04-07 RX ADMIN — DOXYCYCLINE HYCLATE SCH MG: 100 TABLET ORAL at 10:31

## 2020-04-07 NOTE — PDOC PROGRESS REPORT
Subjective


Progress Note for:: 04/07/20


Subjective:: 





Patient reported elbow pain that usually resolved with use of Tylenol at home. 

She denied any fever or chills. No chest pain or difficulty with breathing. No 

nausea, vomiting, or abdominal pain.


Reason For Visit: 


SEPSIS,URINARY TRACT INFECTION








Physical Exam


Vital Signs: 


                                        











Temp Pulse Resp BP Pulse Ox


 


 98.2 F   112 H  17   128/74 H  100 


 


 04/07/20 08:00  04/07/20 08:00  04/07/20 08:00  04/07/20 08:00  04/07/20 08:00








                                 Intake & Output











 04/06/20 04/07/20 04/08/20





 06:59 06:59 06:59


 


Intake Total 3920 2653 


 


Output Total 950 1050 


 


Balance 2970 1603 


 


Weight 82 kg 73.2 kg 











Physical Exam: 


General appearance: PRESENT: no acute distress, well-developed, well-nourished


Head exam: PRESENT: atraumatic, normocephalic


Eye exam: PRESENT: conjunctiva pink  ABSENT: pallor, scleral icterus


Ear exam: PRESENT: normal external ear exam


Mouth exam: PRESENT: moist, tongue midline


Respiratory exam: PRESENT: chest wall tenderness, decreased breath sounds - at 

lung bases


Cardiovascular exam: PRESENT: RRR, +S1, +S2.  ABSENT: diastolic murmur, rubs, 

systolic murmur


GI/Abdominal exam: PRESENT: normal bowel sounds, soft.  ABSENT: distended, 

guarding, mass, organomegaly, rebound, tenderness


Musculoskeletal exam: PRESENT: deformity - related to contracture deformity from

her multiple sclerosis


Neurological exam: PRESENT: alert, awake, oriented to person, oriented to place,

oriented to time, oriented to situation, CN II-XII grossly intact.  ABSENT: 

motor sensory deficit


Psychiatric exam: PRESENT: appropriate affect, normal mood.  ABSENT: homicidal 

ideation, suicidal ideation


Skin exam: PRESENT: dry, intact, warm.  ABSENT: cyanosis, rash








Results


Laboratory Results: 


                                        





                                 04/06/20 13:54 





                                 04/06/20 13:54 





                                        











  04/06/20 04/06/20





  13:54 13:54


 


WBC  8.0 


 


RBC  3.97 


 


Hgb  11.0 L D 


 


Hct  32.4 L 


 


MCV  82 


 


MCH  27.7 


 


MCHC  33.9 


 


RDW  14.5 H 


 


Plt Count  180 


 


Seg Neutrophils %  80.9 H 


 


Sodium   138.0


 


Potassium   3.6


 


Chloride   114 H


 


Carbon Dioxide   19 L


 


Anion Gap   5


 


BUN   14


 


Creatinine   0.73


 


Est GFR (African Amer)   > 60


 


Glucose   109


 


Calcium   8.8


 


Total Bilirubin   0.7


 


AST   28


 


Alkaline Phosphatase   93


 


Total Protein   5.6 L


 


Albumin   2.5 L








                                        





04/05/20 02:58   Blood   Blood Culture (PCR) - Final


                            Staphylococcus Species








Impressions: 


                                        





Chest X-Ray  04/05/20 03:08


IMPRESSION:


 


1. No acute pulmonary process identified.


 














Assessment & Plan





- Diagnosis


(1) Staphylococcus septicemia


Is this a current diagnosis for this admission?: Yes   


Plan: 


Blood culture grew Staphy Simulans resistant to Ceftriaxone but sensitive to 

Tetracycline and Bactrim. Patient has allergy to sulfa drugs. She will d/c IV 

Rocephin. Start on Bactrim DS 1 tablet po bid.








(2) Urinary tract infection


Qualifiers: 


   Urinary tract infection type: site unspecified   Hematuria presence: with 

hematuria   Qualified Code(s): N39.0 - Urinary tract infection, site not 

specified; R31.9 - Hematuria, unspecified   


Is this a current diagnosis for this admission?: Yes   


Plan: 


Urine culture grew Gram negative rods.








(3) Diabetes mellitus type 2 in nonobese


Is this a current diagnosis for this admission?: Yes   





(4) Multiple sclerosis, primary chronic progressive


Is this a current diagnosis for this admission?: Yes   





(5) Gait abnormality


Is this a current diagnosis for this admission?: Yes   





- Time


Time Spent with patient: 25-34 minutes


Level of Care: TELE


Medications reviewed and adjusted accordingly: Yes


Anticipated discharge: Home with Homehealth


Within: Other





- Inpatient Certification


Based on my medical assessment, after consideration of the patient's comor

bidities, presenting symptoms, or acuity I expect that the services needed 

warrant INPATIENT care.: Yes


I certify that my determination is in accordance with my understanding of 

Medicare's requirements for reasonable and necessary INPATIENT services [42 CFR 

412.3e].: Yes


Medical Necessity: Significant Comorbidiites Make Outpatient Treatment Too 

Risky, Need Close Monitoring Due to Risk of Patient Decompensation, Need For IV 

Fluids, Need For Continuous Telemetry Monitoring, Need for IV Antibiotics, Risk 

of Complication if Not Cared For in Hospital, Risk of Diagnosis Which Will 

Require Inpatient Eval/Care/Monitoring


Post Hospital Care: D/C Planner Documentation





- Plan Summary


Plan Summary: 





D/C IV Rocephin. Start on oral Doxycycline 100 mg p.o bid. Continue all other 

current medication management.

## 2020-04-08 RX ADMIN — INSULIN LISPRO SCH: 100 INJECTION, SOLUTION INTRAVENOUS; SUBCUTANEOUS at 16:01

## 2020-04-08 RX ADMIN — INSULIN LISPRO SCH: 100 INJECTION, SOLUTION INTRAVENOUS; SUBCUTANEOUS at 22:11

## 2020-04-08 RX ADMIN — DOXYCYCLINE HYCLATE SCH MG: 100 TABLET ORAL at 22:19

## 2020-04-08 RX ADMIN — INSULIN LISPRO SCH: 100 INJECTION, SOLUTION INTRAVENOUS; SUBCUTANEOUS at 11:00

## 2020-04-08 RX ADMIN — INSULIN LISPRO SCH: 100 INJECTION, SOLUTION INTRAVENOUS; SUBCUTANEOUS at 08:00

## 2020-04-08 RX ADMIN — SODIUM CHLORIDE PRN MLS/HR: 9 INJECTION, SOLUTION INTRAVENOUS at 05:53

## 2020-04-08 RX ADMIN — DOXYCYCLINE HYCLATE SCH MG: 100 TABLET ORAL at 11:22

## 2020-04-08 RX ADMIN — ACETAMINOPHEN PRN MG: 325 TABLET ORAL at 02:16

## 2020-04-08 NOTE — PDOC PROGRESS REPORT
Subjective


Progress Note for:: 04/08/20


Subjective:: 





Patient reported no fever or chills. No chest pain or difficulty with breathing.

No nausea, vomiting, or abdominal pain.


Reason For Visit: 


SEPSIS,URINARY TRACT INFECTION








Physical Exam


Vital Signs: 


                                        











Temp Pulse Resp BP Pulse Ox


 


 97.9 F   82   16   150/103 H  100 


 


 04/08/20 11:08  04/08/20 11:08  04/08/20 11:08  04/08/20 11:08  04/08/20 11:08








                                 Intake & Output











 04/07/20 04/08/20 04/09/20





 06:59 06:59 06:59


 


Intake Total 2653 2240 60


 


Output Total 1050 1475 500


 


Balance 1603 765 -440


 


Weight 73.2 kg 73.2 kg 











Physical Exam: 





General appearance: PRESENT: no acute distress, well-developed, well-nourished


Head exam: PRESENT: atraumatic, normocephalic


Eye exam: PRESENT: conjunctiva pink  ABSENT: pallor, scleral icterus


Ear exam: PRESENT: normal external ear exam


Mouth exam: PRESENT: moist, tongue midline


Respiratory exam: PRESENT: chest wall tenderness, decreased breath sounds - at 

lung bases


Cardiovascular exam: PRESENT: RRR, +S1, +S2.  ABSENT: diastolic murmur, rubs, 

systolic murmur


GI/Abdominal exam: PRESENT: normal bowel sounds, soft.  ABSENT: distended, 

guarding, mass, organomegaly, rebound, tenderness


Musculoskeletal exam: PRESENT: deformity - related to contracture deformity from

her multiple sclerosis


Neurological exam: PRESENT: alert, awake, oriented to person, oriented to place,

oriented to time, oriented to situation, CN II-XII grossly intact.  ABSENT: 

motor sensory deficit


Psychiatric exam: PRESENT: appropriate affect, normal mood.  ABSENT: homicidal 

ideation, suicidal ideation


Skin exam: PRESENT: dry, intact, warm.  ABSENT: cyanosis, rash





Results


Laboratory Results: 


                                        





                                 04/06/20 13:54 





                                 04/06/20 13:54 





                                        





04/06/20 15:13   Catheterized Urine   Urine Culture - Final


                            Escherichia Coli








Impressions: 


                                        





Chest X-Ray  04/05/20 03:08


IMPRESSION:


 


1. No acute pulmonary process identified.


 














Assessment & Plan





- Diagnosis


(1) Staphylococcus septicemia


Is this a current diagnosis for this admission?: Yes   





(2) Urinary tract infection


Qualifiers: 


   Urinary tract infection type: site unspecified   Hematuria presence: with 

hematuria   Qualified Code(s): N39.0 - Urinary tract infection, site not 

specified; R31.9 - Hematuria, unspecified   


Is this a current diagnosis for this admission?: Yes   





(3) Diabetes mellitus type 2 in nonobese


Is this a current diagnosis for this admission?: Yes   





(4) Multiple sclerosis, primary chronic progressive


Is this a current diagnosis for this admission?: Yes   





(5) Gait abnormality


Is this a current diagnosis for this admission?: Yes   





- Time


Time Spent with patient: 25-34 minutes


Level of Care: TELE


Medications reviewed and adjusted accordingly: Yes


Anticipated discharge: Home with Homehealth


Within: Other





- Inpatient Certification


Based on my medical assessment, after consideration of the patient's 

comorbidities, presenting symptoms, or acuity I expect that the services needed 

warrant INPATIENT care.: Yes


I certify that my determination is in accordance with my understanding of 

Medicare's requirements for reasonable and necessary INPATIENT services [42 CFR 

412.3e].: Yes


Medical Necessity: Significant Comorbidiites Make Outpatient Treatment Too 

Risky, Need Close Monitoring Due to Risk of Patient Decompensation, Need For Co

ntinuous Telemetry Monitoring, Risk of Complication if Not Cared For in 

Hospital, Risk of Diagnosis Which Will Require Inpatient Eval/Care/Monitoring


Post Hospital Care: D/C Planner Documentation





- Plan Summary


Plan Summary: 





Continue current medication management. Obtain CBC with diff and BMP in am.

## 2020-04-09 LAB
ADD MANUAL DIFF: NO
ANION GAP SERPL CALC-SCNC: 5 MMOL/L (ref 5–19)
ANION GAP SERPL CALC-SCNC: 5 MMOL/L (ref 5–19)
BASOPHILS # BLD AUTO: 0 10^3/UL (ref 0–0.2)
BASOPHILS NFR BLD AUTO: 0.6 % (ref 0–2)
BUN SERPL-MCNC: 4 MG/DL (ref 7–20)
BUN SERPL-MCNC: 5 MG/DL (ref 7–20)
CALCIUM: 8.6 MG/DL (ref 8.4–10.2)
CALCIUM: 8.6 MG/DL (ref 8.4–10.2)
CHLORIDE SERPL-SCNC: 114 MMOL/L (ref 98–107)
CHLORIDE SERPL-SCNC: 115 MMOL/L (ref 98–107)
CO2 SERPL-SCNC: 21 MMOL/L (ref 22–30)
CO2 SERPL-SCNC: 22 MMOL/L (ref 22–30)
EOSINOPHIL # BLD AUTO: 0.2 10^3/UL (ref 0–0.6)
EOSINOPHIL NFR BLD AUTO: 4.5 % (ref 0–6)
ERYTHROCYTE [DISTWIDTH] IN BLOOD BY AUTOMATED COUNT: 14.8 % (ref 11.5–14)
GLUCOSE SERPL-MCNC: 103 MG/DL (ref 75–110)
GLUCOSE SERPL-MCNC: 99 MG/DL (ref 75–110)
HCT VFR BLD CALC: 33.6 % (ref 36–47)
HGB BLD-MCNC: 11.4 G/DL (ref 12–15.5)
LYMPHOCYTES # BLD AUTO: 1.3 10^3/UL (ref 0.5–4.7)
LYMPHOCYTES NFR BLD AUTO: 31.5 % (ref 13–45)
MCH RBC QN AUTO: 27.1 PG (ref 27–33.4)
MCHC RBC AUTO-ENTMCNC: 34 G/DL (ref 32–36)
MCV RBC AUTO: 80 FL (ref 80–97)
MONOCYTES # BLD AUTO: 0.4 10^3/UL (ref 0.1–1.4)
MONOCYTES NFR BLD AUTO: 10 % (ref 3–13)
NEUTROPHILS # BLD AUTO: 2.3 10^3/UL (ref 1.7–8.2)
NEUTS SEG NFR BLD AUTO: 53.4 % (ref 42–78)
PLATELET # BLD: 182 10^3/UL (ref 150–450)
POTASSIUM SERPL-SCNC: 3.2 MMOL/L (ref 3.6–5)
POTASSIUM SERPL-SCNC: 4 MMOL/L (ref 3.6–5)
RBC # BLD AUTO: 4.21 10^6/UL (ref 3.72–5.28)
TOTAL CELLS COUNTED % (AUTO): 100 %
WBC # BLD AUTO: 4.3 10^3/UL (ref 4–10.5)

## 2020-04-09 RX ADMIN — INSULIN LISPRO SCH: 100 INJECTION, SOLUTION INTRAVENOUS; SUBCUTANEOUS at 11:31

## 2020-04-09 RX ADMIN — INSULIN LISPRO SCH: 100 INJECTION, SOLUTION INTRAVENOUS; SUBCUTANEOUS at 16:03

## 2020-04-09 RX ADMIN — DOXYCYCLINE HYCLATE SCH MG: 100 TABLET ORAL at 10:00

## 2020-04-09 RX ADMIN — INSULIN LISPRO SCH: 100 INJECTION, SOLUTION INTRAVENOUS; SUBCUTANEOUS at 08:07

## 2020-04-09 RX ADMIN — SODIUM CHLORIDE PRN MLS/HR: 9 INJECTION, SOLUTION INTRAVENOUS at 19:59

## 2020-04-09 RX ADMIN — DOXYCYCLINE HYCLATE SCH MG: 100 TABLET ORAL at 21:59

## 2020-04-09 RX ADMIN — POTASSIUM CHLORIDE SCH MEQ: 750 TABLET, FILM COATED, EXTENDED RELEASE ORAL at 13:58

## 2020-04-09 RX ADMIN — POTASSIUM CHLORIDE SCH MEQ: 750 TABLET, FILM COATED, EXTENDED RELEASE ORAL at 10:00

## 2020-04-09 RX ADMIN — INSULIN LISPRO SCH: 100 INJECTION, SOLUTION INTRAVENOUS; SUBCUTANEOUS at 21:58

## 2020-04-09 NOTE — PDOC PROGRESS REPORT
Subjective


Progress Note for:: 04/09/20


Subjective:: 





Patient denied chest pain or difficulty with breathing. No fever or chills. No 

nausea, vomiting, or abdominal pain.


Reason For Visit: 


SEPSIS,URINARY TRACT INFECTION








Physical Exam


Vital Signs: 


                                        











Temp Pulse Resp BP Pulse Ox


 


 98.1 F   72   19   152/90 H  99 


 


 04/09/20 00:14  04/09/20 07:00  04/09/20 00:14  04/09/20 00:14  04/09/20 00:14








                                 Intake & Output











 04/08/20 04/09/20 04/10/20





 06:59 06:59 06:59


 


Intake Total 2240 1180 


 


Output Total 1475 1550 


 


Balance 765 -370 


 


Weight 73.2 kg 73.2 kg 











Physical Exam: 


General appearance: PRESENT: no acute distress, well-developed, well-nourished


Head exam: PRESENT: atraumatic, normocephalic


Eye exam: PRESENT: conjunctiva pink  ABSENT: pallor, scleral icterus


Ear exam: PRESENT: normal external ear exam


Mouth exam: PRESENT: moist, tongue midline


Respiratory exam: PRESENT: chest wall tenderness, decreased breath sounds - at 

lung bases


Cardiovascular exam: PRESENT: RRR, +S1, +S2.  ABSENT: diastolic murmur, rubs, 

systolic murmur


GI/Abdominal exam: PRESENT: normal bowel sounds, soft.  ABSENT: distended, 

guarding, mass, organomegaly, rebound, tenderness


Musculoskeletal exam: PRESENT: deformity - related to contracture deformity from

her multiple sclerosis


Neurological exam: PRESENT: alert, awake, oriented to person, oriented to place,

oriented to time, oriented to situation, CN II-XII grossly intact.  ABSENT: 

motor sensory deficit


Psychiatric exam: PRESENT: appropriate affect, normal mood.  ABSENT: homicidal 

ideation, suicidal ideation


Skin exam: PRESENT: dry, warm.  There is unstageable coccyxgeal pressure ulcer. 

ABSENT: cyanosis, rash





Results


Laboratory Results: 


                                        





                                 04/09/20 06:10 





                                 04/09/20 06:10 





                                        











  04/09/20 04/09/20





  06:10 06:10


 


WBC  4.3 


 


RBC  4.21 


 


Hgb  11.4 L 


 


Hct  33.6 L 


 


MCV  80 


 


MCH  27.1 


 


MCHC  34.0 


 


RDW  14.8 H 


 


Plt Count  182 


 


Seg Neutrophils %  53.4 


 


Sodium   141.8


 


Potassium   3.2 L


 


Chloride   115 H


 


Carbon Dioxide   22


 


Anion Gap   5


 


BUN   4 L


 


Creatinine   0.42 L


 


Est GFR (African Amer)   > 60


 


Glucose   99


 


Calcium   8.6








                                        





04/06/20 15:13   Catheterized Urine   Urine Culture - Final


                            Escherichia Coli








Impressions: 


                                        





Chest X-Ray  04/05/20 03:08


IMPRESSION:


 


1. No acute pulmonary process identified.


 














Assessment & Plan





- Diagnosis


(1) Staphylococcus septicemia


Is this a current diagnosis for this admission?: Yes   





(2) Urinary tract infection


Qualifiers: 


   Urinary tract infection type: site unspecified   Hematuria presence: with 

hematuria   Qualified Code(s): N39.0 - Urinary tract infection, site not 

specified; R31.9 - Hematuria, unspecified   


Is this a current diagnosis for this admission?: Yes   





(3) Diabetes mellitus type 2 in nonobese


Is this a current diagnosis for this admission?: Yes   





(4) Multiple sclerosis, primary chronic progressive


Is this a current diagnosis for this admission?: Yes   





(5) Gait abnormality


Is this a current diagnosis for this admission?: Yes   





(6) Hypokalemia due to inadequate potassium intake


Is this a current diagnosis for this admission?: Yes   


Plan: 


Patient will receive oral potassium replacement therapy. Obtain BMP post 

treatment later today.








(7) Hypomagnesemia


Is this a current diagnosis for this admission?: Yes   


Plan: 


She will receive 1gm of magnesium sulfate infusion today. Obtain serum level 

post treatment.








(8) Pressure ulcer of coccygeal region, unstageable


Is this a current diagnosis for this admission?: Yes   


Plan: 


Continue daily wet to dry with allevyn dressing. She will benefit from [facility] 

service upon discharge for further skill nursing and aide service needs although

family always profess that they are taking good care of the patient. 








- Time


Time Spent with patient: 25-34 minutes


Level of Care: TELE


Medications reviewed and adjusted accordingly: Yes


Anticipated discharge: Home with Homehealth


Within: Other





- Inpatient Certification


Based on my medical assessment, after consideration of the patient's 

comorbidities, presenting symptoms, or acuity I expect that the services needed 

warrant INPATIENT care.: Yes


I certify that my determination is in accordance with my understanding of 

Medicare's requirements for reasonable and necessary INPATIENT services [42 CFR 

412.3e].: Yes


Medical Necessity: Significant Comorbidiites Make Outpatient Treatment Too 

Risky, Need Close Monitoring Due to Risk of Patient Decompensation, Need For IV 

Fluids, Need For Continuous Telemetry Monitoring, Risk of Complication if Not 

Cared For in Hospital, Risk of Diagnosis Which Will Require Inpatient 

Eval/Care/Monitoring


Post Hospital Care: D/C Planner Documentation





- Plan Summary


Plan Summary: 





See attending physician orders for details. Obtain post treatment BMP and Mag 

level. Possible discharge home tomorrow.

## 2020-04-10 VITALS — SYSTOLIC BLOOD PRESSURE: 137 MMHG | DIASTOLIC BLOOD PRESSURE: 84 MMHG

## 2020-04-10 RX ADMIN — INSULIN LISPRO SCH: 100 INJECTION, SOLUTION INTRAVENOUS; SUBCUTANEOUS at 11:39

## 2020-04-10 RX ADMIN — DOXYCYCLINE HYCLATE SCH MG: 100 TABLET ORAL at 11:11

## 2020-04-10 RX ADMIN — INSULIN LISPRO SCH: 100 INJECTION, SOLUTION INTRAVENOUS; SUBCUTANEOUS at 16:26

## 2020-04-10 RX ADMIN — INSULIN LISPRO SCH: 100 INJECTION, SOLUTION INTRAVENOUS; SUBCUTANEOUS at 07:35

## 2020-04-10 RX ADMIN — ACETAMINOPHEN PRN MG: 325 TABLET ORAL at 14:33

## 2020-04-10 RX ADMIN — SODIUM CHLORIDE PRN MLS/HR: 9 INJECTION, SOLUTION INTRAVENOUS at 06:23

## 2020-04-10 NOTE — PDOC DISCHARGE SUMMARY
Impression





- Admit/DC Date/PCP


Admission Date/Primary Care Provider: 


  04/05/20 06:56





  MEG MCCARTHY





Discharge Date: 04/10/20





- Discharge Diagnosis


(1) Staphylococcus septicemia


Is this a current diagnosis for this admission?: Yes   





(2) Urinary tract infection


Is this a current diagnosis for this admission?: Yes   





(3) Diabetes mellitus type 2 in nonobese


Is this a current diagnosis for this admission?: Yes   





(4) Multiple sclerosis, primary chronic progressive


Is this a current diagnosis for this admission?: Yes   





(5) Gait abnormality


Is this a current diagnosis for this admission?: Yes   





(6) Hypokalemia due to inadequate potassium intake


Is this a current diagnosis for this admission?: Yes   





(7) Hypomagnesemia


Is this a current diagnosis for this admission?: Yes   





(8) Pressure ulcer of coccygeal region, unstageable


Is this a current diagnosis for this admission?: Yes   





- Assessment


Summary: 


Patient was admitted for UTI with concern for probable sepsis. Her urine culture

grew E.coli and blood culture did grew staphylococcus simulans. She was treated 

with culture sensitivity reported appropriate antibiotics. She remained bed 

bound due to her advanced multiple sclerosis during this hospitalization. Her 

chronic coccyxgeal unstageable pressure ulcer was managed with wet to dry 

allevyn daily dressing. She will be discharged home today with home health 

agency services for skill nursing and aide. She will follow up on telehealth as 

indicated upon discharge.





- Additional Information


Resuscitation Status: Full Code


Discharge Diet: Diabetic


Discharge Activity: Activity As Tolerated


Referrals: 


[facility] [Outside]


MEG MCCARTHY MD [Primary Care Provider] - 04/22/20 10:00 am


Prescriptions: 


RX: Doxycycline Hyclate [Vibramycin 100 mg Tablet] 100 mg PO Q12 #7 tablet


Home Medications: 








RX: Baclofen [Baclofen 10 mg Tablet] 10 mg PO Q8HP PRN 04/06/20 


RX: Ergocalciferol (Vitamin D2) [Drisdol 50,000 unit (1.25MG) Capsule] 50,000 

unit PO Q7D 04/06/20 


RX: Doxycycline Hyclate [Vibramycin 100 mg Tablet] 100 mg PO Q12 #7 tablet 

04/10/20 











History of Present Illiness


History of Present Illness: 


VI PAIGE is a 54 year old female known to my practice who was brought 

to the ED via EMS with family reporting fever, coughing, and difficulty with 

breathing. Patient's account was more of her daughter's claim that she was 

acting funny with funny eye movement. She denied any fever or chills. EMS staff 

reported incident of possible aspiration with desaturation and need for 

oropharyngeal suction to improve her oxygenation status en route to the ED. She 

was on supplemental oxygen at 3L/minute via nasal canula upon arrival in the ED.

Her initial evaluation was significant for abnormal urinalysis suggestive of UTI

with hypotension. She was treatment with IV fluid infusion and antibiotic 

coverage. She was advised hospitalization for further evaluation and management.

Her morbidities are as listed below.





Hospital Course


Hospital Course: 


Patient was admitted for UTI with concern for probable sepsis. Her urine culture

grew E.coli and blood culture did grew staphylococcus simulans. She was treated 

with culture sensitivity reported appropriate antibiotics. She remained bed 

bound due to her advanced multiple sclerosis during this hospitalization. Her 

chronic coccyxgeal unstageable pressure ulcer was managed with wet to dry 

allevyn daily dressing. She will be discharged home today with home health 

agency services for skill nursing and aide. She will follow up on telehealth as 

indicated upon discharge.





Physical Exam


Vital Signs: 


                                        











Temp Pulse Resp BP Pulse Ox


 


 98.2 F   95   16   139/86 H  100 


 


 04/10/20 11:33  04/10/20 11:33  04/10/20 11:33  04/10/20 11:33  04/10/20 11:33








                                 Intake & Output











 04/09/20 04/10/20 04/11/20





 06:59 06:59 06:59


 


Intake Total 1180 1490 30


 


Output Total 1550 1610 475


 


Balance -370 -120 -445


 


Weight 73.2 kg 74.1 kg 














General appearance: PRESENT: no acute distress, well-developed, well-nourished


Head exam: PRESENT: atraumatic, normocephalic


Eye exam: PRESENT: conjunctiva pink  ABSENT: pallor, scleral icterus


Ear exam: PRESENT: normal external ear exam


Mouth exam: PRESENT: moist, tongue midline


Respiratory exam: PRESENT: chest wall tenderness, decreased breath sounds - at 

lung bases


Cardiovascular exam: PRESENT: RRR, +S1, +S2.  ABSENT: diastolic murmur, rubs, 

systolic murmur


GI/Abdominal exam: PRESENT: normal bowel sounds, soft.  ABSENT: distended, 

guarding, mass, organomegaly, rebound, tenderness


Musculoskeletal exam: PRESENT: deformity - related to contracture deformity from

her multiple sclerosis


Neurological exam: PRESENT: alert, awake, oriented to person, oriented to place,

oriented to time, oriented to situation, CN II-XII grossly intact.  ABSENT: 

motor sensory deficit


Psychiatric exam: PRESENT: appropriate affect, normal mood.  ABSENT: homicidal 

ideation, suicidal ideation


Skin exam: PRESENT: dry, warm.  There is unstageable coccyxgeal pressure ulcer. 

ABSENT: cyanosis, rash











Results


Laboratory Results: 


                                        











WBC  4.3 10^3/uL (4.0-10.5)   04/09/20  06:10    


 


RBC  4.21 10^6/uL (3.72-5.28)   04/09/20  06:10    


 


Hgb  11.4 g/dL (12.0-15.5)  L  04/09/20  06:10    


 


Hct  33.6 % (36.0-47.0)  L  04/09/20  06:10    


 


MCV  80 fl (80-97)   04/09/20  06:10    


 


MCH  27.1 pg (27.0-33.4)   04/09/20  06:10    


 


MCHC  34.0 g/dL (32.0-36.0)   04/09/20  06:10    


 


RDW  14.8 % (11.5-14.0)  H  04/09/20  06:10    


 


Plt Count  182 10^3/uL (150-450)   04/09/20  06:10    


 


Lymph % (Auto)  31.5 % (13-45)   04/09/20  06:10    


 


Mono % (Auto)  10.0 % (3-13)   04/09/20  06:10    


 


Eos % (Auto)  4.5 % (0-6)   04/09/20  06:10    


 


Baso % (Auto)  0.6 % (0-2)   04/09/20  06:10    


 


Absolute Neuts (auto)  2.3 10^3/uL (1.7-8.2)   04/09/20  06:10    


 


Absolute Lymphs (auto)  1.3 10^3/uL (0.5-4.7)   04/09/20  06:10    


 


Absolute Monos (auto)  0.4 10^3/uL (0.1-1.4)   04/09/20  06:10    


 


Absolute Eos (auto)  0.2 10^3/uL (0.0-0.6)   04/09/20  06:10    


 


Absolute Basos (auto)  0.0 10^3/uL (0.0-0.2)   04/09/20  06:10    


 


Seg Neutrophils %  53.4 % (42-78)   04/09/20  06:10    


 


PT  13.9 SEC (11.4-15.4)   04/05/20  02:58    


 


INR  1.07   04/05/20  02:58    


 


VBG pH  7.35  (7.30-7.42)   04/05/20  03:51    


 


VBG pCO2  30.5 mmHg (35-63)  L  04/05/20  03:51    


 


VBG HCO3  16.6 mmol/L (20-32)  L  04/05/20  03:51    


 


VBG Base Excess  -7.7 mmol/L  04/05/20  03:51    


 


Sodium  140.0 mmol/L (137-145)   04/09/20  17:56    


 


Potassium  4.0 mmol/L (3.6-5.0)   04/09/20  17:56    


 


Chloride  114 mmol/L ()  H  04/09/20  17:56    


 


Carbon Dioxide  21 mmol/L (22-30)  L  04/09/20  17:56    


 


Anion Gap  5  (5-19)   04/09/20  17:56    


 


BUN  5 mg/dL (7-20)  L  04/09/20  17:56    


 


Creatinine  0.37 mg/dL (0.52-1.25)  L  04/09/20  17:56    


 


Est GFR ( Amer)  > 60  (>60)   04/09/20  17:56    


 


Est GFR (MDRD) Non-Af  > 60  (>60)   04/09/20  17:56    


 


Glucose  103 mg/dL ()   04/09/20  17:56    


 


POC Glucose  83 mg/dL ()   04/10/20  10:40    


 


Lactic Acid  3.3 mmol/L (0.7-2.1)  H  04/05/20  09:45    


 


Calcium  8.6 mg/dL (8.4-10.2)   04/09/20  17:56    


 


Magnesium  1.8 mg/dL (1.6-2.3)   04/09/20  17:56    


 


Total Bilirubin  0.7 mg/dL (0.2-1.3)   04/06/20  13:54    


 


Direct Bilirubin  0.1 mg/dL (0.0-0.4)   04/06/20  13:54    


 


Neonat Total Bilirubin  Not Reportable   04/06/20  13:54    


 


Neonat Direct Bilirubin  Not Reportable   04/06/20  13:54    


 


Neonat Indirect Bili  Not Reportable   04/06/20  13:54    


 


AST  28 U/L (14-36)   04/06/20  13:54    


 


ALT  21 U/L (<35)   04/06/20  13:54    


 


Alkaline Phosphatase  93 U/L ()   04/06/20  13:54    


 


Total Protein  5.6 g/dL (6.3-8.2)  L  04/06/20  13:54    


 


Albumin  2.5 g/dL (3.5-5.0)  L  04/06/20  13:54    


 


Urine Color  YELLOW   04/05/20  03:08    


 


Urine Appearance  TURBID   04/05/20  03:08    


 


Urine pH  7.0  (5.0-9.0)   04/05/20  03:08    


 


Ur Specific Gravity  1.011   04/05/20  03:08    


 


Urine Protein  100 mg/dL (NEGATIVE)  H  04/05/20  03:08    


 


Urine Glucose (UA)  NEGATIVE mg/dL (NEGATIVE)   04/05/20  03:08    


 


Urine Ketones  TRACE mg/dL (NEGATIVE)  H  04/05/20  03:08    


 


Urine Blood  MODERATE  (NEGATIVE)  H  04/05/20  03:08    


 


Urine Nitrite (Reflex)  POSITIVE  (NEGATIVE)  H  04/05/20  03:08    


 


Urine Bilirubin  NEGATIVE  (NEGATIVE)   04/05/20  03:08    


 


Urine Urobilinogen  NEGATIVE mg/dL (<2.0)   04/05/20  03:08    


 


Leukocyte Esterase Rfl  LARGE  (NEGATIVE)  H  04/05/20  03:08    


 


Urine RBC (Auto)  134 /HPF  04/05/20  03:08    


 


Urine Bacteria (Auto)  1+ /HPF  04/05/20  03:08    


 


Urine WBC (Reflex)  > 182 /HPF  04/05/20  03:08    


 


Urine WBC Clumps  MANY /HPF  04/05/20  03:08    


 


Squamous Epi Cells Auto  8 /HPF  04/05/20  03:08    


 


U Non-Squamous Epis Auto  4 /HPF  04/05/20  03:08    


 


Urine Mucus (Auto)  RARE /LPF  04/05/20  03:08    


 


Urine Ascorbic Acid  NEGATIVE  (NEGATIVE)   04/05/20  03:08    











Impressions: 


                                        





Chest X-Ray  04/05/20 03:08


IMPRESSION:


 


1. No acute pulmonary process identified.


 














Plan


Health Concerns: 


Bed bound situation from her multiple sclerosis make her wound management a 

problem. She is high risk for readmission.


Plan of Treatment: 


Maintain on Doxycyline therapy for 7 days.


Goals: 


Close follow up and maintenance of home environment with family to reduce 

readmission  risk.





Stroke


Is this a Stroke Patient?: No





Acute Heart Failure





- **


Is this a Heart Failure Patient?: No

## 2020-05-19 ENCOUNTER — HOSPITAL ENCOUNTER (INPATIENT)
Dept: HOSPITAL 62 - ER | Age: 55
LOS: 7 days | Discharge: HOME | DRG: 871 | End: 2020-05-26
Attending: INTERNAL MEDICINE | Admitting: INTERNAL MEDICINE
Payer: MEDICARE

## 2020-05-19 DIAGNOSIS — A41.9: Primary | ICD-10-CM

## 2020-05-19 DIAGNOSIS — E87.6: ICD-10-CM

## 2020-05-19 DIAGNOSIS — Z91.030: ICD-10-CM

## 2020-05-19 DIAGNOSIS — B96.4: ICD-10-CM

## 2020-05-19 DIAGNOSIS — N39.0: ICD-10-CM

## 2020-05-19 DIAGNOSIS — Z91.040: ICD-10-CM

## 2020-05-19 DIAGNOSIS — G35: ICD-10-CM

## 2020-05-19 DIAGNOSIS — Z86.711: ICD-10-CM

## 2020-05-19 DIAGNOSIS — Z74.01: ICD-10-CM

## 2020-05-19 DIAGNOSIS — B96.89: ICD-10-CM

## 2020-05-19 DIAGNOSIS — G93.41: ICD-10-CM

## 2020-05-19 DIAGNOSIS — E55.9: ICD-10-CM

## 2020-05-19 DIAGNOSIS — Z88.2: ICD-10-CM

## 2020-05-19 DIAGNOSIS — E03.9: ICD-10-CM

## 2020-05-19 DIAGNOSIS — E11.9: ICD-10-CM

## 2020-05-19 DIAGNOSIS — L89.151: ICD-10-CM

## 2020-05-19 DIAGNOSIS — I48.0: ICD-10-CM

## 2020-05-19 LAB
ABSOLUTE LYMPHOCYTES# (MANUAL): 0.9 10^3/UL (ref 0.5–4.7)
ABSOLUTE MONOCYTES # (MANUAL): 0.6 10^3/UL (ref 0.1–1.4)
ADD MANUAL DIFF: YES
ALBUMIN SERPL-MCNC: 4.2 G/DL (ref 3.5–5)
ALP SERPL-CCNC: 115 U/L (ref 38–126)
ANION GAP SERPL CALC-SCNC: 12 MMOL/L (ref 5–19)
ANISOCYTOSIS BLD QL SMEAR: SLIGHT
APPEARANCE UR: (no result)
APTT PPP: YELLOW S
AST SERPL-CCNC: 30 U/L (ref 14–36)
BASE EXCESS BLDV CALC-SCNC: -2.7 MMOL/L
BASOPHILS NFR BLD MANUAL: 0 % (ref 0–2)
BILIRUB DIRECT SERPL-MCNC: 0.1 MG/DL (ref 0–0.4)
BILIRUB SERPL-MCNC: 1.2 MG/DL (ref 0.2–1.3)
BILIRUB UR QL STRIP: NEGATIVE
BUN SERPL-MCNC: 18 MG/DL (ref 7–20)
CALCIUM: 10.7 MG/DL (ref 8.4–10.2)
CHLORIDE SERPL-SCNC: 106 MMOL/L (ref 98–107)
CO2 SERPL-SCNC: 22 MMOL/L (ref 22–30)
EOSINOPHIL NFR BLD MANUAL: 1 % (ref 0–6)
ERYTHROCYTE [DISTWIDTH] IN BLOOD BY AUTOMATED COUNT: 15.2 % (ref 11.5–14)
GLUCOSE SERPL-MCNC: 136 MG/DL (ref 75–110)
GLUCOSE UR STRIP-MCNC: NEGATIVE MG/DL
HCO3 BLDV-SCNC: 21.2 MMOL/L (ref 20–32)
HCT VFR BLD CALC: 39.5 % (ref 36–47)
HGB BLD-MCNC: 13 G/DL (ref 12–15.5)
INR PPP: 0.99
KETONES UR STRIP-MCNC: 20 MG/DL
MCH RBC QN AUTO: 27 PG (ref 27–33.4)
MCHC RBC AUTO-ENTMCNC: 33 G/DL (ref 32–36)
MCV RBC AUTO: 82 FL (ref 80–97)
MONOCYTES % (MANUAL): 4 % (ref 3–13)
NEUTS BAND NFR BLD MANUAL: 1 % (ref 3–5)
PCO2 BLDV: 34.6 MMHG (ref 35–63)
PH BLDV: 7.41 [PH] (ref 7.3–7.42)
PH UR STRIP: 5 [PH] (ref 5–9)
PLATELET # BLD: 287 10^3/UL (ref 150–450)
PLATELET COMMENT: ADEQUATE
POTASSIUM SERPL-SCNC: 4.8 MMOL/L (ref 3.6–5)
PROT SERPL-MCNC: 8.3 G/DL (ref 6.3–8.2)
PROT UR STRIP-MCNC: 30 MG/DL
PROTHROMBIN TIME: 13.1 SEC (ref 11.4–15.4)
RBC # BLD AUTO: 4.83 10^6/UL (ref 3.72–5.28)
SEGMENTED NEUTROPHILS % (MAN): 88 % (ref 42–78)
SP GR UR STRIP: 1.01
TOTAL CELLS COUNTED BLD: 100
UROBILINOGEN UR-MCNC: NEGATIVE MG/DL (ref ?–2)
VARIANT LYMPHS NFR BLD MANUAL: 6 % (ref 13–45)
WBC # BLD AUTO: 15.2 10^3/UL (ref 4–10.5)

## 2020-05-19 PROCEDURE — 36415 COLL VENOUS BLD VENIPUNCTURE: CPT

## 2020-05-19 PROCEDURE — 84484 ASSAY OF TROPONIN QUANT: CPT

## 2020-05-19 PROCEDURE — 71045 X-RAY EXAM CHEST 1 VIEW: CPT

## 2020-05-19 PROCEDURE — 82565 ASSAY OF CREATININE: CPT

## 2020-05-19 PROCEDURE — 99285 EMERGENCY DEPT VISIT HI MDM: CPT

## 2020-05-19 PROCEDURE — 85025 COMPLETE CBC W/AUTO DIFF WBC: CPT

## 2020-05-19 PROCEDURE — 93010 ELECTROCARDIOGRAM REPORT: CPT

## 2020-05-19 PROCEDURE — 87186 SC STD MICRODIL/AGAR DIL: CPT

## 2020-05-19 PROCEDURE — 82803 BLOOD GASES ANY COMBINATION: CPT

## 2020-05-19 PROCEDURE — 87077 CULTURE AEROBIC IDENTIFY: CPT

## 2020-05-19 PROCEDURE — 87040 BLOOD CULTURE FOR BACTERIA: CPT

## 2020-05-19 PROCEDURE — 82962 GLUCOSE BLOOD TEST: CPT

## 2020-05-19 PROCEDURE — 96365 THER/PROPH/DIAG IV INF INIT: CPT

## 2020-05-19 PROCEDURE — 87086 URINE CULTURE/COLONY COUNT: CPT

## 2020-05-19 PROCEDURE — 93005 ELECTROCARDIOGRAM TRACING: CPT

## 2020-05-19 PROCEDURE — 80202 ASSAY OF VANCOMYCIN: CPT

## 2020-05-19 PROCEDURE — 87150 DNA/RNA AMPLIFIED PROBE: CPT

## 2020-05-19 PROCEDURE — 87088 URINE BACTERIA CULTURE: CPT

## 2020-05-19 PROCEDURE — 80053 COMPREHEN METABOLIC PANEL: CPT

## 2020-05-19 PROCEDURE — 81001 URINALYSIS AUTO W/SCOPE: CPT

## 2020-05-19 PROCEDURE — 83605 ASSAY OF LACTIC ACID: CPT

## 2020-05-19 PROCEDURE — 85610 PROTHROMBIN TIME: CPT

## 2020-05-19 RX ADMIN — SODIUM CHLORIDE PRN MLS/HR: 9 INJECTION, SOLUTION INTRAVENOUS at 16:23

## 2020-05-19 RX ADMIN — SODIUM CHLORIDE PRN MLS/HR: 9 INJECTION, SOLUTION INTRAVENOUS at 16:55

## 2020-05-19 NOTE — PDOC H&P
History of Present Illness


Admission Date/PCP: 


  20 18:06





  [facility]





History of Present Illness: 


VI PAIGE is a 54 year old female patient known to my practice who 

presented to the ED via EMS service with family complain about fever and high 

heart rate. Patient denied any chest pain, difficulty with breathing, nausea, 

vomiting, or abdominal pain. She has history of multiple sclerosis with severe 

contracture deformity and presently bedbound. There is her morbidity associated 

incontinence and reported skin breakdown in the sacral region. She admitted to 

associated weakness but denied dizziness, headache, and change in her mentation 

presently. Her initial ED evaluation was significant for leukocytosis and 

abnormal urinalysis suggestive of possible UTI and Her morbidities are as listed

below.








Past Medical History


Cardiac Medical History: Reports: Pulmonary Embolism - 2 years ago


   Denies: Atrial Fibrillation, Congestive Heart Failure, Myocardial Infarction,

Hyperlipidema, Hypertension


Pulmonary Medical History: 


   Denies: Asthma, Bronchitis, Chronic Obstructive Pulmonary Disease (COPD), 

Pneumonia, Respiratory Failure, Sleep Apnea, Tuberculosis


Neurological Medical History: Reports: Other - Multiple sclerosis


   Denies: Migraine, Seizures


Endocrine Medical History: Reports: Diabetes Mellitus Type 2, Hypothyroidism


   Denies: Diabetes Mellitus Type 1


Renal/ Medical History: 


   Denies: End Stage Renal Disease


Malignancy Medical History: 


   Denies: Leukemia, Lung Cancer


GI Medical History: 


   Denies: Gastroesophageal Reflux Disease, Hiatal Hernia


Musculoskeltal Medical History: 


   Denies: Arthritis


Psychiatric Medical History: 


   Denies: Attention Deficit Hyperactivity Disorder, Bipolar Disorder, Dementia,

Depression


Hematology: Reports: Anemia


   Denies: Hemophilia, Sickle Cell Disease


Infectious Medical History: 


   Denies: HIV





Past Surgical History


Past Surgical History: Reports:  Section - x2, Cholecystectomy


   Denies: Appendectomy, Coronary Artery Bypass Graft, Gastric Bypass Surgery, 

Herniorrhaphy, Hysterectomy, Mastectomy, Pacemaker, Tonsillectomy, Tubal 

Ligation





Social History


Smoking Status: Unknown if Ever Smoked


Frequency of Alcohol Use: None


Hx Recreational Drug Use: No


Drugs: None


Hx Prescription Drug Abuse: No





- Advance Directive


Resuscitation Status: Full Code





Family History


Family History: Reviewed & Not Pertinent


Parental Family History Reviewed: Yes


Children Family History Reviewed: Yes


Sibling(s) Family History Reviewed.: Yes





Medication/Allergy


Home Medications: 








Baclofen [Baclofen 10 mg Tablet] 10 mg PO Q8HP PRN 20 


Ergocalciferol (Vitamin D2) [Drisdol 50,000 unit (1.25MG) Capsule] 50,000 unit 

PO WE@1000 20 








Allergies/Adverse Reactions: 


                                        





latex [Latex] Allergy (Intermediate, Verified 20 16:45)


   RASH/BLISTER


Sulfa (Sulfonamide Antibiotics) Allergy (Intermediate, Verified 20 16:45)


   RASH/HIVES


bee venom protein (honey bee) Allergy (Verified 20 16:45)


   Anaphylaxis











Review of Systems


Constitutional: PRESENT: weakness.  ABSENT: chills, fever(s), headache(s), 

weight gain, weight loss


Eyes: ABSENT: visual disturbances


Ears: ABSENT: hearing changes


Cardiovascular: ABSENT: chest pain, dyspnea on exertion, edema, orthropnea, 

palpitations


Respiratory: ABSENT: cough, hemoptysis


Gastrointestinal: ABSENT: abdominal pain, constipation, diarrhea, hematemesis, 

hematochezia, nausea, vomiting


Genitourinary: ABSENT: dysuria, hematuria


Musculoskeletal: ABSENT: joint swelling


Integumentary: ABSENT: rash, wounds


Neurological: PRESENT: abnormal speech - related to her multiple sclerosis, 

focal weakness - related to contracture deformity from her multiple sclerosis.  

ABSENT: confusion, dizziness, syncope


Psychiatric: ABSENT: anxiety, depression, homidical ideation, suicidal ideation


Endocrine: ABSENT: cold intolerance, heat intolerance, polydipsia, polyuria


Hematologic/Lymphatic: ABSENT: easy bleeding, easy bruising, lymphadenopathy





Physical Exam


Vital Signs: 


                                        











Temp Pulse Resp BP Pulse Ox


 


 97.9 F      16   99/77 L  99 


 


 20 18:45     20 20:01  20 20:00  20 20:01








                                 Intake & Output











 20





 06:59 06:59 06:59


 


Intake Total   2617


 


Balance   2617


 


Weight   67.1 kg











General appearance: PRESENT: no acute distress, disheveled


Head exam: PRESENT: atraumatic, normocephalic


Eye exam: PRESENT: conjunctiva pink, EOMI, PERRLA.  ABSENT: scleral icterus


Ear exam: PRESENT: normal external ear exam


Mouth exam: PRESENT: moist, tongue midline


Respiratory exam: PRESENT: clear to auscultation elver, decreased breath sounds - 

at lung bases


Cardiovascular exam: PRESENT: RRR, +S1, +S2, tachycardia.  ABSENT: diastolic 

murmur, rubs, systolic murmur


Vascular exam: ABSENT: pallor


GI/Abdominal exam: PRESENT: normal bowel sounds, soft.  ABSENT: distended, 

guarding, mass, organolmegaly, rebound, tenderness


Rectal exam: PRESENT: deferred


Extremities exam: ABSENT: pedal edema


Musculoskeletal exam: PRESENT: deformity - contracture deformities due to her 

multiple sclerosis


Neurological exam: PRESENT: alert, awake, oriented to person, oriented to place,

 oriented to time, oriented to situation


Psychiatric exam: PRESENT: appropriate affect, normal mood.  ABSENT: homicidal 

ideation, suicidal ideation


Skin exam: PRESENT: dry, rash - on scalp fro dry skin and seborrhea, warm





Results


Laboratory Results: 


                                        





                                 20 15:46 





                                 20 15:46 





                                        











  20





  15:46 15:46 15:46


 


WBC  15.2 H  


 


RBC  4.83  


 


Hgb  13.0  


 


Hct  39.5  


 


MCV  82  


 


MCH  27.0  


 


MCHC  33.0  


 


RDW  15.2 H  


 


Plt Count  287  


 


Seg Neutrophils %  Not Reportable  


 


VBG pH    7.41


 


VBG pCO2    34.6 L


 


VBG HCO3    21.2


 


VBG Base Excess    -2.7


 


Sodium   139.9 


 


Potassium   4.8 


 


Chloride   106 


 


Carbon Dioxide   22 


 


Anion Gap   12 


 


BUN   18 


 


Creatinine   0.76 


 


Est GFR ( Amer)   > 60 


 


Glucose   136 H 


 


Lactic Acid   


 


Calcium   10.7 H 


 


Total Bilirubin   1.2 


 


AST   30 


 


Alkaline Phosphatase   115 


 


Total Protein   8.3 H 


 


Albumin   4.2 


 


Urine Color   


 


Urine Appearance   


 


Urine pH   


 


Ur Specific Gravity   


 


Urine Protein   


 


Urine Glucose (UA)   


 


Urine Ketones   


 


Urine Blood   


 


Urine RBC (Auto)   














  20





  15:46 16:13 18:45


 


WBC   


 


RBC   


 


Hgb   


 


Hct   


 


MCV   


 


MCH   


 


MCHC   


 


RDW   


 


Plt Count   


 


Seg Neutrophils %   


 


VBG pH   


 


VBG pCO2   


 


VBG HCO3   


 


VBG Base Excess   


 


Sodium   


 


Potassium   


 


Chloride   


 


Carbon Dioxide   


 


Anion Gap   


 


BUN   


 


Creatinine   


 


Est GFR (African Amer)   


 


Glucose   


 


Lactic Acid  1.0   0.7


 


Calcium   


 


Total Bilirubin   


 


AST   


 


Alkaline Phosphatase   


 


Total Protein   


 


Albumin   


 


Urine Color   YELLOW 


 


Urine Appearance   CLOUDY 


 


Urine pH   5.0 


 


Ur Specific Gravity   1.012 


 


Urine Protein   30 H 


 


Urine Glucose (UA)   NEGATIVE 


 


Urine Ketones   20 H 


 


Urine Blood   MODERATE H 


 


Urine RBC (Auto)   31 








                                        











  20





  15:46


 


Troponin I  < 0.012











Impressions: 


                                        





Chest X-Ray  20 15:42


IMPRESSION:  1.  Low lung volumes.  Slight left lung base atelectasis.  No acute

 pulmonary consolidation.


 














Assessment & Plan





- Diagnosis


(1) Urinary tract infection


Qualifiers: 


   Urinary tract infection type: site unspecified   Hematuria presence: without 

hematuria   Qualified Code(s): N39.0 - Urinary tract infection, site not 

specified   


Is this a current diagnosis for this admission?: Yes   


Plan: 


See admitting attending physician orders for details about her care plan.








(2) Diabetes mellitus type 2 in nonobese


Is this a current diagnosis for this admission?: Yes   


Plan: 


See admitting attending physician orders for details about her care plan.








(3) Hypothyroidism


Qualifiers: 


   Hypothyroidism type: unspecified   Qualified Code(s): E03.9 - Hypothyroidism,

 unspecified   


Is this a current diagnosis for this admission?: Yes   


Plan: 


See admitting attending physician orders for details about her care plan.








(4) Vitamin D deficiency


Is this a current diagnosis for this admission?: Yes   


Plan: 


See admitting attending physician orders for details about her care plan.








(5) Multiple sclerosis, primary chronic progressive


Is this a current diagnosis for this admission?: Yes   


Plan: 


See admitting attending physician orders for details about her care plan.








- Time


Time Spent: 50 to 70 Minutes


Medications reviewed and adjusted accordingly: Yes


Anticipated discharge: Home with Homehealth


Within: Other





- Inpatient Certification


Based on my medical assessment, after consideration of the patient's 

comorbidities, presenting symptoms, or acuity I expect that the services needed 

warrant INPATIENT care.: Yes


I certify that my determination is in accordance with my understanding of 

Medicare's requirements for reasonable and necessary INPATIENT services [42 CFR 

412.3e].: Yes


Medical Necessity: Significant Comorbidiites Make Outpatient Treatment Too 

Risky, Need Close Monitoring Due to Risk of Patient Decompensation, Need For IV 

Fluids, Need For Continuous Telemetry Monitoring, Need for IV Antibiotics, Risk 

of Complication if Not Cared For in Hospital, Risk of Diagnosis Which Will 

Require Inpatient Eval/Care/Monitoring


Post Hospital Care: D/C Planner Documentation





- Plan Summary


Plan Summary: 





See admitting attending physician orders for details about her care plan.

## 2020-05-19 NOTE — ER DOCUMENT REPORT
ED General





- General


Chief Complaint: Fever


Stated Complaint: FEVER


Time Seen by Provider: 20 15:39


Primary Care Provider: 


MEG MCCARTHY MD [Primary Care Provider] - Follow up as needed


TRAVEL OUTSIDE OF THE [place] IN LAST 30 DAYS: No





- HPI


Notes: 





Patient is a 54-year-old female with a history of multiple sclerosis who 

presents to the emergency department for evaluation of fever and elevated heart 

rate.  She states she had family members "watching out for sepsis."  She denies 

any pain.  She states she just feels weak.  She is nonambulatory.





- Related Data


Allergies/Adverse Reactions: 


                                        





latex [Latex] Allergy (Intermediate, Verified 20 16:45)


   RASH/BLISTER


Sulfa (Sulfonamide Antibiotics) Allergy (Intermediate, Verified 20 16:45)


   RASH/HIVES


bee venom protein (honey bee) Allergy (Verified 20 16:45)


   Anaphylaxis











Past Medical History





- General


Information source: Patient





- Social History


Smoking Status: Unknown if Ever Smoked


Family History: Reviewed & Not Pertinent





- Past Medical History


Cardiac Medical History: Reports: Hx Pulmonary Embolism - 2 years ago


   Denies: Hx Atrial Fibrillation, Hx Congestive Heart Failure, Hx Heart Attack,

 Hx Hypercholesterolemia, Hx Hypertension


Pulmonary Medical History: 


   Denies: Hx Asthma, Hx Bronchitis, Hx COPD, Hx Pneumonia, Hx Respiratory 

Failure, Hx Sleep Apnea, Hx Tuberculosis


Neurological Medical History: Reports: Other - Multiple sclerosis.  Denies: Hx 

Cerebrovascular Accident, Hx Migraine, Hx Seizures, Hx Parkinson's Disease


Endocrine Medical History: Reports: Hx Hypothyroidism.  Denies: Hx Diabetes 

Mellitus Type 1


Renal/ Medical History: Denies: Hx End Stage Renal Disease, Hx Kidney Stones, 

Hx Peritoneal Dialysis


Malignancy Medical History: Denies: Hx Leukemia, Hx Lung Cancer


GI Medical History: Denies: Hx Gastroesophageal Reflux Disease, Hx Hiatal 

Hernia, Hx Ulcer


Musculoskeletal Medical History: Denies Hx Arthritis, Reports Hx Multiple 

Sclerosis


Psychiatric Medical History: 


   Denies: Hx Attention Deficit Hyperactivity Disorder, Hx Bipolar Disorder, Hx 

Dementia, Hx Depression, Hx Schizophrenia


Infectious Medical History: Denies: Hx HIV


Past Surgical History: Reports: Hx  Section - x2, Hx Cholecystectomy.  

Denies: Hx Appendectomy, Hx Bowel Surgery, Hx Coronary Artery Bypass Graft, Hx 

Gastric Bypass Surgery, Hx Herniorrhaphy, Hx Hysterectomy, Hx Mastectomy, Hx 

Pacemaker, Hx Tonsillectomy, Hx Tubal Ligation





- Immunizations


Immunizations up to date: Yes


Hx Diphtheria, Pertussis, Tetanus Vaccination: Yes





Review of Systems





- Review of Systems


Constitutional: See HPI


-: Yes All other systems reviewed and negative





Physical Exam





- Vital signs


Vitals: 





                                        











Pulse Ox


 


 96 


 


 20 15:26














- Notes


Notes: 





This is a frail-appearing 54-year-old female, who appears much older than her 

stated age in no acute distress.  Head is normocephalic and atraumatic, pupils 

are equal round, reactive to light.  Mucosa is moist.  Uvula is midline.  Heart 

is tachycardic with normal S1-S2.  Lungs are clear station bilaterally.  Abdomen

 is obese, nontender with normoactive bowel sounds.  Extremities without 

cyanosis or clubbing.  Peripheral pulses are equal, posterior calves are 

nontender.  Examination of the skin yields multiple stage II and III ischial and

 sacral decubitus ulcers.  Patient is awake and alert, cooperative examiner.





Course





- Re-evaluation


Re-evalutation: 





20 17:48


Patient presents to the emergency department for evaluation.  She was markedly 

tachycardic but afebrile here.  She did take Tylenol home, but she was unsure as

 to when.  Patient was started on the sepsis protocol.  She was given a bolus.  

She was found have a UTI.  We did go ahead and treat with ceftriaxone.  Because 

of concern regarding the decubiti being a source of infection, I did order 

vancomycin, as she has had some methicillin-resistant staph in the past.  I 

spoke with Dr. Mccarthy.  He will admit the patient for further care.





- Vital Signs


Vital signs: 





                                        











Temp Pulse Resp BP Pulse Ox


 


 98.6 F      23 H  138/84 H  98 


 


 20 15:28     20 16:01  20 16:00  20 16:01














- Laboratory


Result Diagrams: 


                                 20 15:46





                                 20 15:46


Laboratory results interpreted by me: 





                                        











  20





  15:46 15:46 15:46


 


WBC  15.2 H  


 


RDW  15.2 H  


 


Seg Neuts % (Manual)  88 H  


 


Band Neutrophils %  1 L  


 


Lymphocytes % (Manual)  6 L  


 


Abs Neuts (Manual)  13.5 H  


 


VBG pCO2    34.6 L


 


Glucose   136 H 


 


Calcium   10.7 H 


 


Total Protein   8.3 H 


 


Urine Protein   


 


Urine Ketones   


 


Urine Blood   


 


Urine Nitrite (Reflex)   


 


Leukocyte Esterase Rfl   














  20





  16:13


 


WBC 


 


RDW 


 


Seg Neuts % (Manual) 


 


Band Neutrophils % 


 


Lymphocytes % (Manual) 


 


Abs Neuts (Manual) 


 


VBG pCO2 


 


Glucose 


 


Calcium 


 


Total Protein 


 


Urine Protein  30 H


 


Urine Ketones  20 H


 


Urine Blood  MODERATE H


 


Urine Nitrite (Reflex)  POSITIVE H


 


Leukocyte Esterase Rfl  LARGE H














- Diagnostic Test


Radiology reviewed: Reports reviewed


Radiology results interpreted by me: 





20 17:49





                                        





Chest X-Ray  20 15:42


IMPRESSION:  1.  Low lung volumes.  Slight left lung base atelectasis.  No acute

 pulmonary consolidation.


 














Discharge





- Discharge


Clinical Impression: 


Urinary tract infection


Qualifiers:


 Urinary tract infection type: site unspecified Hematuria presence: without 

hematuria Qualified Code(s): N39.0 - Urinary tract infection, site not specified





Sepsis


Qualifiers:


 Sepsis type: sepsis due to unspecified organism Severe sepsis shock status: 

without septic shock 





Condition: Stable


Disposition: ADMITTED AS INPATIENT


Admitting Provider: Aura


Unit Admitted: Telemetry


Referrals: 


MEG MCCARTHY MD [Primary Care Provider] - Follow up as needed

## 2020-05-19 NOTE — RADIOLOGY REPORT (SQ)
EXAM DESCRIPTION:  CHEST SINGLE VIEW



IMAGES COMPLETED DATE/TIME:  5/19/2020 5:09 pm



REASON FOR STUDY:  eval sepsis



COMPARISON:  4/5/2020



EXAM PARAMETERS:  NUMBER OF VIEWS: One view.

TECHNIQUE: Single frontal radiographic view of the chest acquired.

RADIATION DOSE: NA

LIMITATIONS: None.



FINDINGS:  LUNGS AND PLEURA:  Low lung volumes.  Slight left lung base atelectasis.  No acute pulmona
ry consolidation.  No pneumothorax or pleural effusion.

MEDIASTINUM AND HILAR STRUCTURES: No masses.  Contour normal.

HEART AND VASCULAR STRUCTURES: Heart normal in size.  Normal vasculature.

BONES: No acute findings.

HARDWARE:  Left basilar chest tube.

OTHER: No other significant finding.



IMPRESSION:  1.  Low lung volumes.  Slight left lung base atelectasis.  No acute pulmonary consolidat
ion.



TECHNICAL DOCUMENTATION:  JOB ID:  [id number]

 2011 [employer]- All Rights Reserved



Reading location - IP/workstation name: [email or web address]

## 2020-05-20 LAB
ADD MANUAL DIFF: NO
ALBUMIN SERPL-MCNC: 3.2 G/DL (ref 3.5–5)
ALP SERPL-CCNC: 83 U/L (ref 38–126)
ANION GAP SERPL CALC-SCNC: 8 MMOL/L (ref 5–19)
AST SERPL-CCNC: 22 U/L (ref 14–36)
BASOPHILS # BLD AUTO: 0 10^3/UL (ref 0–0.2)
BASOPHILS NFR BLD AUTO: 0.5 % (ref 0–2)
BILIRUB DIRECT SERPL-MCNC: 0.1 MG/DL (ref 0–0.4)
BILIRUB SERPL-MCNC: 0.9 MG/DL (ref 0.2–1.3)
BUN SERPL-MCNC: 14 MG/DL (ref 7–20)
CALCIUM: 9.4 MG/DL (ref 8.4–10.2)
CHLORIDE SERPL-SCNC: 112 MMOL/L (ref 98–107)
CO2 SERPL-SCNC: 19 MMOL/L (ref 22–30)
EOSINOPHIL # BLD AUTO: 0.1 10^3/UL (ref 0–0.6)
EOSINOPHIL NFR BLD AUTO: 1.1 % (ref 0–6)
ERYTHROCYTE [DISTWIDTH] IN BLOOD BY AUTOMATED COUNT: 15.2 % (ref 11.5–14)
GLUCOSE SERPL-MCNC: 93 MG/DL (ref 75–110)
HCT VFR BLD CALC: 35.8 % (ref 36–47)
HGB BLD-MCNC: 12 G/DL (ref 12–15.5)
LYMPHOCYTES # BLD AUTO: 0.9 10^3/UL (ref 0.5–4.7)
LYMPHOCYTES NFR BLD AUTO: 11.2 % (ref 13–45)
MCH RBC QN AUTO: 27.6 PG (ref 27–33.4)
MCHC RBC AUTO-ENTMCNC: 33.5 G/DL (ref 32–36)
MCV RBC AUTO: 82 FL (ref 80–97)
MONOCYTES # BLD AUTO: 0.7 10^3/UL (ref 0.1–1.4)
MONOCYTES NFR BLD AUTO: 8.5 % (ref 3–13)
NEUTROPHILS # BLD AUTO: 6.2 10^3/UL (ref 1.7–8.2)
NEUTS SEG NFR BLD AUTO: 78.7 % (ref 42–78)
PLATELET # BLD: 217 10^3/UL (ref 150–450)
POTASSIUM SERPL-SCNC: 4.1 MMOL/L (ref 3.6–5)
PROT SERPL-MCNC: 6.6 G/DL (ref 6.3–8.2)
RBC # BLD AUTO: 4.35 10^6/UL (ref 3.72–5.28)
TOTAL CELLS COUNTED % (AUTO): 100 %
WBC # BLD AUTO: 7.8 10^3/UL (ref 4–10.5)

## 2020-05-20 RX ADMIN — ACETAMINOPHEN PRN MG: 325 TABLET ORAL at 08:05

## 2020-05-20 RX ADMIN — INSULIN LISPRO SCH: 100 INJECTION, SOLUTION INTRAVENOUS; SUBCUTANEOUS at 22:00

## 2020-05-20 RX ADMIN — BACLOFEN PRN MG: 10 TABLET ORAL at 09:48

## 2020-05-20 RX ADMIN — CEFTRIAXONE SCH MLS/HR: 1 INJECTION, SOLUTION INTRAVENOUS at 17:48

## 2020-05-20 RX ADMIN — SODIUM CHLORIDE PRN MLS/HR: 9 INJECTION, SOLUTION INTRAVENOUS at 00:50

## 2020-05-20 RX ADMIN — ENOXAPARIN SODIUM SCH MG: 40 INJECTION SUBCUTANEOUS at 09:48

## 2020-05-20 RX ADMIN — INSULIN LISPRO SCH: 100 INJECTION, SOLUTION INTRAVENOUS; SUBCUTANEOUS at 08:04

## 2020-05-20 RX ADMIN — SODIUM CHLORIDE PRN MLS/HR: 9 INJECTION, SOLUTION INTRAVENOUS at 15:59

## 2020-05-20 RX ADMIN — BACLOFEN PRN MG: 10 TABLET ORAL at 17:48

## 2020-05-20 RX ADMIN — VANCOMYCIN HYDROCHLORIDE SCH MLS/HR: 1 INJECTION, POWDER, LYOPHILIZED, FOR SOLUTION INTRAVENOUS at 22:09

## 2020-05-20 RX ADMIN — PANTOPRAZOLE SODIUM SCH MG: 40 TABLET, DELAYED RELEASE ORAL at 05:22

## 2020-05-20 RX ADMIN — INSULIN LISPRO SCH: 100 INJECTION, SOLUTION INTRAVENOUS; SUBCUTANEOUS at 16:56

## 2020-05-20 RX ADMIN — ACETAMINOPHEN PRN MG: 325 TABLET ORAL at 15:59

## 2020-05-20 RX ADMIN — INSULIN LISPRO SCH: 100 INJECTION, SOLUTION INTRAVENOUS; SUBCUTANEOUS at 11:55

## 2020-05-20 NOTE — PDOC PROGRESS REPORT
Subjective


Progress Note for:: 05/20/20


Subjective:: 





Patient denied any fever or chills. No chest pain or difficulty with breathing. 

No nausea, vomiting, or abdominal pain. Remain on IV Rocephin. Her blood culture

grew gram positive cocci in clusters.


Reason For Visit: 


UTI,MS,DM TYPE 2,HYPOTHYROIDISM,VIT D DEFICIENCY








Physical Exam


Vital Signs: 


                                        











Temp Pulse Resp BP Pulse Ox


 


 99.6 F   120 H  16   136/75 H  99 


 


 05/20/20 15:21  05/20/20 15:21  05/20/20 15:21  05/20/20 15:21  05/20/20 15:21








                                 Intake & Output











 05/19/20 05/20/20 05/21/20





 06:59 06:59 06:59


 


Intake Total  3170 1200


 


Output Total  100 150


 


Balance  3070 1050


 


Weight  74.5 kg 











General appearance: PRESENT: no acute distress, well-developed, well-nourished


Head exam: PRESENT: atraumatic, normocephalic


Eye exam: PRESENT: conjunctiva pink.  ABSENT: scleral icterus


Mouth exam: PRESENT: moist


Respiratory exam: PRESENT: clear to auscultation elver, decreased breath sounds - 

at lung bases


Cardiovascular exam: PRESENT: +S1, +S2, tachycardia.  ABSENT: diastolic murmur, 

systolic murmur


Vascular exam: ABSENT: pallor


GI/Abdominal exam: PRESENT: normal bowel sounds, soft.  ABSENT: distended, 

guarding, mass, organolmegaly, rebound, tenderness


Extremities exam: ABSENT: pedal edema


Musculoskeletal exam: PRESENT: deformity - from extensive contraction


Neurological exam: PRESENT: alert, awake


Skin exam: PRESENT: dry, warm





Results


Laboratory Results: 


                                        





                                 05/20/20 05:40 





                                 05/20/20 05:40 





                                        











  05/19/20 05/19/20 05/20/20





  18:45 22:16 05:40


 


WBC    7.8


 


RBC    4.35


 


Hgb    12.0


 


Hct    35.8 L


 


MCV    82


 


MCH    27.6


 


MCHC    33.5


 


RDW    15.2 H


 


Plt Count    217


 


Seg Neutrophils %    78.7 H


 


Sodium   


 


Potassium   


 


Chloride   


 


Carbon Dioxide   


 


Anion Gap   


 


BUN   


 


Creatinine   


 


Est GFR (African Amer)   


 


Glucose   


 


Lactic Acid  0.7  1.4 


 


Calcium   


 


Total Bilirubin   


 


AST   


 


Alkaline Phosphatase   


 


Total Protein   


 


Albumin   














  05/20/20





  05:40


 


WBC 


 


RBC 


 


Hgb 


 


Hct 


 


MCV 


 


MCH 


 


MCHC 


 


RDW 


 


Plt Count 


 


Seg Neutrophils % 


 


Sodium  139.0


 


Potassium  4.1


 


Chloride  112 H


 


Carbon Dioxide  19 L


 


Anion Gap  8


 


BUN  14


 


Creatinine  0.75


 


Est GFR (African Amer)  > 60


 


Glucose  93


 


Lactic Acid 


 


Calcium  9.4


 


Total Bilirubin  0.9


 


AST  22


 


Alkaline Phosphatase  83


 


Total Protein  6.6


 


Albumin  3.2 L








                                        





05/19/20 17:06   Blood   Blood Culture (PCR) - Final


                            Staphylococcus Species





                                        











  05/19/20





  15:46


 


Troponin I  < 0.012











Impressions: 


                                        





Chest X-Ray  05/19/20 15:42


IMPRESSION:  1.  Low lung volumes.  Slight left lung base atelectasis.  No acute

pulmonary consolidation.


 














Assessment & Plan





- Diagnosis


(1) Gram positive septicemia


Is this a current diagnosis for this admission?: Yes   


Plan: 


Start on IV Vancomycin and maintain on V Rocephin pending final findings.








(2) Urinary tract infection


Qualifiers: 


   Urinary tract infection type: site unspecified   Hematuria presence: without 

hematuria   Qualified Code(s): N39.0 - Urinary tract infection, site not 

specified   


Is this a current diagnosis for this admission?: Yes   


Plan: 


Urine growing gram negative rods. Maintain on IV Rocephin. Follow up on final 

report of urine culture.








(3) Diabetes mellitus type 2 in nonobese


Is this a current diagnosis for this admission?: Yes   





(4) Hypothyroidism


Qualifiers: 


   Hypothyroidism type: unspecified   Qualified Code(s): E03.9 - Hypothyroidism,

unspecified   


Is this a current diagnosis for this admission?: Yes   





(5) Vitamin D deficiency


Is this a current diagnosis for this admission?: Yes   





(6) Multiple sclerosis, primary chronic progressive


Is this a current diagnosis for this admission?: Yes   





- Time


Time Spent with patient: 25-34 minutes


Level of Care: TELE


Medications reviewed and adjusted accordingly: Yes


Anticipated discharge: Home with Homehealth


Within: Other





- Inpatient Certification


Based on my medical assessment, after consideration of the patient's 

comorbidities, presenting symptoms, or acuity I expect that the services needed 

warrant INPATIENT care.: Yes


I certify that my determination is in accordance with my understanding of 

Medicare's requirements for reasonable and necessary INPATIENT services [42 CFR 

412.3e].: Yes


Medical Necessity: Significant Comorbidiites Make Outpatient Treatment Too 

Risky, Need Close Monitoring Due to Risk of Patient Decompensation, Need For IV 

Fluids, Need For Continuous Telemetry Monitoring, Need for IV Antibiotics, Risk 

of Complication if Not Cared For in Hospital, Risk of Diagnosis Which Will Requ

esau Inpatient Eval/Care/Monitoring


Post Hospital Care: D/C Planner Documentation





- Plan Summary


Plan Summary: 





Continue current medication management. Follow up on culture findings.

## 2020-05-21 RX ADMIN — PANTOPRAZOLE SODIUM SCH: 40 TABLET, DELAYED RELEASE ORAL at 05:32

## 2020-05-21 RX ADMIN — Medication PRN MLS/HR: at 21:55

## 2020-05-21 RX ADMIN — PANTOPRAZOLE SODIUM SCH MG: 40 TABLET, DELAYED RELEASE ORAL at 05:26

## 2020-05-21 RX ADMIN — INSULIN LISPRO SCH: 100 INJECTION, SOLUTION INTRAVENOUS; SUBCUTANEOUS at 09:16

## 2020-05-21 RX ADMIN — INSULIN LISPRO SCH: 100 INJECTION, SOLUTION INTRAVENOUS; SUBCUTANEOUS at 13:18

## 2020-05-21 RX ADMIN — ENOXAPARIN SODIUM SCH MG: 40 INJECTION SUBCUTANEOUS at 10:36

## 2020-05-21 RX ADMIN — INSULIN LISPRO SCH: 100 INJECTION, SOLUTION INTRAVENOUS; SUBCUTANEOUS at 16:08

## 2020-05-21 RX ADMIN — SODIUM CHLORIDE PRN MLS/HR: 9 INJECTION, SOLUTION INTRAVENOUS at 19:00

## 2020-05-21 RX ADMIN — CEFTRIAXONE SCH MLS/HR: 1 INJECTION, SOLUTION INTRAVENOUS at 18:08

## 2020-05-21 RX ADMIN — VANCOMYCIN HYDROCHLORIDE SCH MLS/HR: 1 INJECTION, POWDER, LYOPHILIZED, FOR SOLUTION INTRAVENOUS at 10:37

## 2020-05-21 RX ADMIN — Medication PRN MLS/HR: at 19:04

## 2020-05-21 NOTE — EKG REPORT
SEVERITY:- ABNORMAL ECG -

SINUS TACHYCARDIA

REPOL ABNRM SUGGESTS ISCHEMIA, ANT-LAT LEADS

:

Confirmed by: Mahnaz Cortez MD 21-May-2020 20:56:22

## 2020-05-21 NOTE — PDOC PROGRESS REPORT
Subjective


Progress Note for:: 05/21/20


Subjective:: 





No chest pain or difficulty with breathing. Patient denied any fever or chills. 

Remain on IV Rocephin and Vancomycin. No nausea, vomiting, or abdominal pain. 

Her blood culture grew gram positive cocci in clusters.


Reason For Visit: 


UTI,MS,DM TYPE 2,HYPOTHYROIDISM,VIT D DEFICIENCY








Physical Exam


Vital Signs: 


                                        











Temp Pulse Resp BP Pulse Ox


 


 98.5 F   109 H  17   129/64 H  99 


 


 05/20/20 23:59  05/21/20 02:00  05/20/20 23:59  05/20/20 23:59  05/20/20 23:59








                                 Intake & Output











 05/20/20 05/21/20 05/22/20





 06:59 06:59 06:59


 


Intake Total 3170 1500 


 


Output Total 100 850 


 


Balance 3070 650 


 


Weight 74.5 kg 74.2 kg 











Physical Exam: 


General appearance: PRESENT: no acute distress, well-developed, well-nourished


Head exam: PRESENT: atraumatic, normocephalic


Eye exam: PRESENT: conjunctiva pink.  ABSENT: pallor, scleral icterus


Mouth exam: PRESENT: moist


Respiratory exam: PRESENT: clear to auscultation elver, decreased breath sounds - 

at lung bases


Cardiovascular exam: PRESENT: +S1, +S2, tachycardia.  ABSENT: diastolic murmur, 

systolic murmur


GI/Abdominal exam: PRESENT: normal bowel sounds, soft.  ABSENT: distended, 

guarding, mass, organomegaly, rebound, tenderness


Extremities exam: ABSENT: pedal edema


Musculoskeletal exam: PRESENT: deformity - from extensive contraction


Neurological exam: PRESENT: alert, awake


Skin exam: PRESENT: dry, warm








Results


Laboratory Results: 


                                        





                                 05/20/20 05:40 





                                 05/20/20 05:40 





                                        





05/19/20 17:06   Blood   Blood Culture (PCR) - Final


                            Staphylococcus Species





                                        











  05/19/20





  15:46


 


Troponin I  < 0.012











Impressions: 


                                        





Chest X-Ray  05/19/20 15:42


IMPRESSION:  1.  Low lung volumes.  Slight left lung base atelectasis.  No acute

pulmonary consolidation.


 














Assessment & Plan





- Diagnosis


(1) Gram positive septicemia


Is this a current diagnosis for this admission?: Yes   





(2) Urinary tract infection


Qualifiers: 


   Urinary tract infection type: site unspecified   Hematuria presence: without 

hematuria   Qualified Code(s): N39.0 - Urinary tract infection, site not 

specified   


Is this a current diagnosis for this admission?: Yes   





(3) Diabetes mellitus type 2 in nonobese


Is this a current diagnosis for this admission?: Yes   





(4) Hypothyroidism


Qualifiers: 


   Hypothyroidism type: unspecified   Qualified Code(s): E03.9 - Hypothyroidism,

unspecified   


Is this a current diagnosis for this admission?: Yes   





(5) Vitamin D deficiency


Is this a current diagnosis for this admission?: Yes   





(6) Multiple sclerosis, primary chronic progressive


Is this a current diagnosis for this admission?: Yes   





- Time


Time Spent with patient: 25-34 minutes


Level of Care: TELE


Medications reviewed and adjusted accordingly: Yes


Anticipated discharge: Home with Homehealth


Within: Other





- Inpatient Certification


Based on my medical assessment, after consideration of the patient's 

comorbidities, presenting symptoms, or acuity I expect that the services needed 

warrant INPATIENT care.: Yes


I certify that my determination is in accordance with my understanding of 

Medicare's requirements for reasonable and necessary INPATIENT services [42 CFR 

412.3e].: Yes


Medical Necessity: Significant Comorbidiites Make Outpatient Treatment Too 

Risky, Need Close Monitoring Due to Risk of Patient Decompensation, Need For IV 

Fluids, Need For Continuous Telemetry Monitoring, Need for IV Antibiotics, Risk 

of Complication if Not Cared For in Hospital, Risk of Diagnosis Which Will 

Require Inpatient Eval/Care/Monitoring


Post Hospital Care: D/C Planner Documentation





- Plan Summary


Plan Summary: 





Continue IV Rocephin and Vancomycin therapy. Follow up on pending culture 

findings. Dr. Etienne will cover my service till 05/27/2020.

## 2020-05-22 LAB — VANCOMYCIN,TROUGH: 21.9 UG/ML (ref 5–20)

## 2020-05-22 RX ADMIN — INSULIN LISPRO SCH: 100 INJECTION, SOLUTION INTRAVENOUS; SUBCUTANEOUS at 21:50

## 2020-05-22 RX ADMIN — Medication PRN MLS/HR: at 03:51

## 2020-05-22 RX ADMIN — INSULIN LISPRO SCH: 100 INJECTION, SOLUTION INTRAVENOUS; SUBCUTANEOUS at 16:31

## 2020-05-22 RX ADMIN — VANCOMYCIN HYDROCHLORIDE SCH MLS/HR: 1 INJECTION, POWDER, LYOPHILIZED, FOR SOLUTION INTRAVENOUS at 10:16

## 2020-05-22 RX ADMIN — VANCOMYCIN HYDROCHLORIDE SCH MLS/HR: 1 INJECTION, POWDER, LYOPHILIZED, FOR SOLUTION INTRAVENOUS at 00:05

## 2020-05-22 RX ADMIN — INSULIN LISPRO SCH: 100 INJECTION, SOLUTION INTRAVENOUS; SUBCUTANEOUS at 08:23

## 2020-05-22 RX ADMIN — Medication PRN MLS/HR: at 21:52

## 2020-05-22 RX ADMIN — SODIUM CHLORIDE PRN MLS/HR: 9 INJECTION, SOLUTION INTRAVENOUS at 04:57

## 2020-05-22 RX ADMIN — INSULIN LISPRO SCH: 100 INJECTION, SOLUTION INTRAVENOUS; SUBCUTANEOUS at 12:35

## 2020-05-22 RX ADMIN — CEFTRIAXONE SCH MLS/HR: 1 INJECTION, SOLUTION INTRAVENOUS at 17:06

## 2020-05-22 RX ADMIN — PANTOPRAZOLE SODIUM SCH: 40 TABLET, DELAYED RELEASE ORAL at 05:31

## 2020-05-22 RX ADMIN — SODIUM CHLORIDE PRN MLS/HR: 9 INJECTION, SOLUTION INTRAVENOUS at 14:13

## 2020-05-22 RX ADMIN — Medication PRN MLS/HR: at 11:20

## 2020-05-22 RX ADMIN — INSULIN LISPRO SCH: 100 INJECTION, SOLUTION INTRAVENOUS; SUBCUTANEOUS at 00:25

## 2020-05-22 RX ADMIN — ENOXAPARIN SODIUM SCH MG: 40 INJECTION SUBCUTANEOUS at 09:12

## 2020-05-22 NOTE — PDOC PROGRESS REPORT
Subjective


Progress Note for:: 05/22/20


Subjective:: 


Patient seen by the bedside, she was transferred from medical floor to a 

monitored bed in [facility] yesterday because of tachycardia, the heart rate was as 

high as 160, there was occasional SVT/A. fib.  Patient does not communicate 

much, she was noticed to be retaining urine this morning a Cherry catheter was 

inserted copious amount of urine was collected, the urine appearance was very 

cloudy looks infected.  The urine culture is polymicrobial but it seems that the

predominant organism is Serratia with significant colony count,  sensitive to 

ceftriaxone, patient already on ceftriaxone, the blood culture also grew gram-

positive cocci in clusters not MRSA, coagulase-negative probably staph epi 

probably contamination, will DC vancomycin presently on hold





Reason For Visit: 


UTI,MS,DM TYPE 2,HYPOTHYROIDISM,VIT D DEFICIENCY








Physical Exam


Vital Signs: 


                                        











Temp Pulse Resp BP Pulse Ox


 


 98.3 F   100   24 H  101/51 L  98 


 


 05/22/20 08:14  05/22/20 18:00  05/22/20 04:19  05/22/20 18:00  05/22/20 16:12








                                 Intake & Output











 05/21/20 05/22/20 05/23/20





 06:59 06:59 06:59


 


Intake Total 2500 1411 1685


 


Output Total 850 700 250


 


Balance 5731 042 5303


 


Weight 74.2 kg 72.2 kg 











General appearance: PRESENT: no acute distress


Eye exam: PRESENT: PERRLA


Respiratory exam: PRESENT: clear to auscultation elver


Cardiovascular exam: PRESENT: +S1, +S2


GI/Abdominal exam: PRESENT: soft


Neurological exam: PRESENT: alert





Results


Laboratory Results: 


                                        





                                 05/20/20 05:40 





                                 05/20/20 05:40 





                                        





05/19/20 16:13   Catheterized Urine   Urine Culture - Final


                            Serratia Marcescens


                            Proteus Mirabilis





                                        











  05/19/20





  15:46


 


Troponin I  < 0.012











Impressions: 


                                        





Chest X-Ray  05/19/20 15:42


IMPRESSION:  1.  Low lung volumes.  Slight left lung base atelectasis.  No acute

pulmonary consolidation.


 














Assessment & Plan





- Diagnosis


(1) Sepsis


Qualifiers: 


   Sepsis type: sepsis due to unspecified organism   Severe sepsis shock status:

without septic shock 


Is this a current diagnosis for this admission?: Yes   


Plan: 


She has sepsis syndrome due to UTI continue present treatment, Hydration 

antibiotic








(2) Urinary tract infection


Qualifiers: 


   Urinary tract infection type: site unspecified   Hematuria presence: without 

hematuria   Qualified Code(s): N39.0 - Urinary tract infection, site not 

specified   


Is this a current diagnosis for this admission?: Yes   


Plan: 


The patient will continue ceftriaxone








(3) Diabetes mellitus type 2 in obese


Is this a current diagnosis for this admission?: Yes   





(4) Multiple sclerosis


Is this a current diagnosis for this admission?: Yes   





(5) Paroxysmal atrial fibrillation


Is this a current diagnosis for this admission?: Yes   


Plan: 


This is sepsis related continue Cardizem infusion








- Time


Time Spent with patient: 35 or more minutes


Level of Care: IMCU

## 2020-05-23 LAB — VANCOMYCIN,TROUGH: 19.3 UG/ML (ref 5–20)

## 2020-05-23 RX ADMIN — Medication PRN MLS/HR: at 22:42

## 2020-05-23 RX ADMIN — PANTOPRAZOLE SODIUM SCH MG: 40 TABLET, DELAYED RELEASE ORAL at 05:42

## 2020-05-23 RX ADMIN — SODIUM CHLORIDE PRN MLS/HR: 9 INJECTION, SOLUTION INTRAVENOUS at 00:15

## 2020-05-23 RX ADMIN — SODIUM CHLORIDE PRN MLS/HR: 9 INJECTION, SOLUTION INTRAVENOUS at 09:29

## 2020-05-23 RX ADMIN — INSULIN LISPRO SCH: 100 INJECTION, SOLUTION INTRAVENOUS; SUBCUTANEOUS at 08:24

## 2020-05-23 RX ADMIN — INSULIN LISPRO SCH: 100 INJECTION, SOLUTION INTRAVENOUS; SUBCUTANEOUS at 16:45

## 2020-05-23 RX ADMIN — SODIUM CHLORIDE PRN MLS/HR: 9 INJECTION, SOLUTION INTRAVENOUS at 20:48

## 2020-05-23 RX ADMIN — Medication PRN MLS/HR: at 09:29

## 2020-05-23 RX ADMIN — ENOXAPARIN SODIUM SCH MG: 40 INJECTION SUBCUTANEOUS at 09:29

## 2020-05-23 RX ADMIN — CEFTRIAXONE SCH MLS/HR: 1 INJECTION, SOLUTION INTRAVENOUS at 17:22

## 2020-05-23 RX ADMIN — INSULIN LISPRO SCH: 100 INJECTION, SOLUTION INTRAVENOUS; SUBCUTANEOUS at 12:14

## 2020-05-23 RX ADMIN — INSULIN LISPRO SCH: 100 INJECTION, SOLUTION INTRAVENOUS; SUBCUTANEOUS at 22:37

## 2020-05-23 NOTE — PDOC PROGRESS REPORT
Subjective


Progress Note for:: 05/23/20


Subjective:: 





Patient seen by the bedside, she is more alert today, she was able to engage in 

conversation, she is doing quite well compared to previous days


Reason For Visit: 


UTI,MS,DM TYPE 2,HYPOTHYROIDISM,VIT D DEFICIENCY








Physical Exam


Vital Signs: 


                                        











Temp Pulse Resp BP Pulse Ox


 


 98.3 F   106 H  12   122/52 L  99 


 


 05/23/20 11:21  05/23/20 14:00  05/22/20 19:34  05/23/20 12:00  05/23/20 11:21








                                 Intake & Output











 05/22/20 05/23/20 05/24/20





 06:59 06:59 06:59


 


Intake Total 1411 2714 1039


 


Output Total 700 475 


 


Balance 711 2239 1039


 


Weight 72.2 kg 72.2 kg 











General appearance: PRESENT: no acute distress


Eye exam: PRESENT: PERRLA


Respiratory exam: PRESENT: clear to auscultation elver


Cardiovascular exam: PRESENT: +S1, +S2


GI/Abdominal exam: PRESENT: soft


Neurological exam: PRESENT: alert





Results


Laboratory Results: 


                                        





                                 05/20/20 05:40 





                                 05/23/20 05:56 





                                        











  05/23/20





  05:56


 


Creatinine  0.58


 


Est GFR ( Amer)  > 60








                                        





05/19/20 17:06   Blood   Blood Culture (PCR) - Final


                            Staphylococcus Species





                                        











  05/19/20





  15:46


 


Troponin I  < 0.012











Impressions: 


                                        





Chest X-Ray  05/19/20 15:42


IMPRESSION:  1.  Low lung volumes.  Slight left lung base atelectasis.  No acute

pulmonary consolidation.


 














Assessment & Plan





- Diagnosis


(1) Sepsis


Qualifiers: 


   Sepsis type: sepsis due to unspecified organism   Severe sepsis shock status:

without septic shock 


Is this a current diagnosis for this admission?: Yes   


Plan: 


She has sepsis due to UTI, she is responding to antibiotic continue present line

of management.  She has Staphylococcus hominis from 1 culture bottle most likely

contamination








(2) Urinary tract infection


Qualifiers: 


   Urinary tract infection type: site unspecified   Hematuria presence: without 

hematuria   Qualified Code(s): N39.0 - Urinary tract infection, site not 

specified   


Is this a current diagnosis for this admission?: Yes   


Plan: 


Urine culture grew Serratia with significant colony count more than 100,000, 

continue antibiotic








(3) Diabetes mellitus type 2 in obese


Is this a current diagnosis for this admission?: Yes   





(4) Multiple sclerosis


Is this a current diagnosis for this admission?: Yes   


Plan: 


She has multiple sclerosis, progressive type with loss of muscle function








(5) Paroxysmal atrial fibrillation


Is this a current diagnosis for this admission?: Yes   


Plan: 


She had episode of paroxysmal atrial fibrillation, she required Cardizem 

infusion for rate control, this is most likely sepsis related, questionable 

anticoagulation chronically, chads S2 score 1 








- Time


Time Spent with patient: 25-34 minutes


Level of Care: IMCU

## 2020-05-24 LAB
ADD MANUAL DIFF: NO
ALBUMIN SERPL-MCNC: 2.3 G/DL (ref 3.5–5)
ALP SERPL-CCNC: 108 U/L (ref 38–126)
ANION GAP SERPL CALC-SCNC: 7 MMOL/L (ref 5–19)
AST SERPL-CCNC: 21 U/L (ref 14–36)
BASOPHILS # BLD AUTO: 0 10^3/UL (ref 0–0.2)
BASOPHILS NFR BLD AUTO: 0.4 % (ref 0–2)
BILIRUB DIRECT SERPL-MCNC: 0.1 MG/DL (ref 0–0.4)
BILIRUB SERPL-MCNC: 0.5 MG/DL (ref 0.2–1.3)
BUN SERPL-MCNC: 5 MG/DL (ref 7–20)
CALCIUM: 8.5 MG/DL (ref 8.4–10.2)
CHLORIDE SERPL-SCNC: 114 MMOL/L (ref 98–107)
CO2 SERPL-SCNC: 20 MMOL/L (ref 22–30)
EOSINOPHIL # BLD AUTO: 0.3 10^3/UL (ref 0–0.6)
EOSINOPHIL NFR BLD AUTO: 5.7 % (ref 0–6)
ERYTHROCYTE [DISTWIDTH] IN BLOOD BY AUTOMATED COUNT: 15.7 % (ref 11.5–14)
GLUCOSE SERPL-MCNC: 103 MG/DL (ref 75–110)
HCT VFR BLD CALC: 30.4 % (ref 36–47)
HGB BLD-MCNC: 10.4 G/DL (ref 12–15.5)
LYMPHOCYTES # BLD AUTO: 1.1 10^3/UL (ref 0.5–4.7)
LYMPHOCYTES NFR BLD AUTO: 20.7 % (ref 13–45)
MCH RBC QN AUTO: 27 PG (ref 27–33.4)
MCHC RBC AUTO-ENTMCNC: 34.2 G/DL (ref 32–36)
MCV RBC AUTO: 79 FL (ref 80–97)
MONOCYTES # BLD AUTO: 0.5 10^3/UL (ref 0.1–1.4)
MONOCYTES NFR BLD AUTO: 9.2 % (ref 3–13)
NEUTROPHILS # BLD AUTO: 3.4 10^3/UL (ref 1.7–8.2)
NEUTS SEG NFR BLD AUTO: 64 % (ref 42–78)
PLATELET # BLD: 131 10^3/UL (ref 150–450)
POTASSIUM SERPL-SCNC: 3.1 MMOL/L (ref 3.6–5)
PROT SERPL-MCNC: 5.4 G/DL (ref 6.3–8.2)
RBC # BLD AUTO: 3.84 10^6/UL (ref 3.72–5.28)
TOTAL CELLS COUNTED % (AUTO): 100 %
WBC # BLD AUTO: 5.3 10^3/UL (ref 4–10.5)

## 2020-05-24 RX ADMIN — INSULIN LISPRO SCH: 100 INJECTION, SOLUTION INTRAVENOUS; SUBCUTANEOUS at 09:47

## 2020-05-24 RX ADMIN — SODIUM CHLORIDE PRN MLS/HR: 9 INJECTION, SOLUTION INTRAVENOUS at 15:57

## 2020-05-24 RX ADMIN — PANTOPRAZOLE SODIUM SCH MG: 40 TABLET, DELAYED RELEASE ORAL at 05:19

## 2020-05-24 RX ADMIN — INSULIN LISPRO SCH: 100 INJECTION, SOLUTION INTRAVENOUS; SUBCUTANEOUS at 21:45

## 2020-05-24 RX ADMIN — CEFTRIAXONE SCH MLS/HR: 1 INJECTION, SOLUTION INTRAVENOUS at 17:49

## 2020-05-24 RX ADMIN — INSULIN LISPRO SCH: 100 INJECTION, SOLUTION INTRAVENOUS; SUBCUTANEOUS at 11:40

## 2020-05-24 RX ADMIN — ENOXAPARIN SODIUM SCH MG: 40 INJECTION SUBCUTANEOUS at 09:55

## 2020-05-24 RX ADMIN — INSULIN LISPRO SCH: 100 INJECTION, SOLUTION INTRAVENOUS; SUBCUTANEOUS at 16:44

## 2020-05-24 RX ADMIN — SODIUM CHLORIDE PRN MLS/HR: 9 INJECTION, SOLUTION INTRAVENOUS at 05:57

## 2020-05-24 NOTE — PDOC PROGRESS REPORT
Subjective


Progress Note for:: 05/24/20


Subjective:: 


Patient seen by the bedside, no chest pain or shortness of breath





Reason For Visit: 


UTI,MS,DM TYPE 2,HYPOTHYROIDISM,VIT D DEFICIENCY








Physical Exam


Vital Signs: 


                                        











Temp Pulse Resp BP Pulse Ox


 


 97.6 F   101 H  12   113/66   100 


 


 05/24/20 07:33  05/24/20 15:00  05/22/20 19:34  05/24/20 15:00  05/24/20 07:33








                                 Intake & Output











 05/23/20 05/24/20 05/25/20





 06:59 06:59 06:59


 


Intake Total 2714 3654 1000


 


Output Total 475 1375 


 


Balance 2239 2279 1000


 


Weight 72.2 kg 74.2 kg 74.2 kg











General appearance: PRESENT: no acute distress


Eye exam: PRESENT: PERRLA


Respiratory exam: PRESENT: clear to auscultation elver


Cardiovascular exam: PRESENT: +S1, +S2


GI/Abdominal exam: PRESENT: soft


Neurological exam: PRESENT: alert





Results


Laboratory Results: 


                                        





                                 05/20/20 05:40 





                                 05/23/20 05:56 





                                        





05/19/20 15:46   Blood   Blood Culture - Final


                            NO GROWTH IN 5 DAYS


05/19/20 17:06   Blood   Blood Culture (PCR) - Final


                            Staphylococcus Species


05/19/20 17:06   Blood   Blood Culture - Final


                            Staphylococcus Hominis





                                        











  05/19/20





  15:46


 


Troponin I  < 0.012











Impressions: 


                                        





Chest X-Ray  05/19/20 15:42


IMPRESSION:  1.  Low lung volumes.  Slight left lung base atelectasis.  No acute

pulmonary consolidation.


 














Assessment & Plan





- Diagnosis


(1) Sepsis


Qualifiers: 


   Sepsis type: sepsis due to unspecified organism   Severe sepsis shock status:

without septic shock 


Is this a current diagnosis for this admission?: Yes   





(2) Urinary tract infection


Qualifiers: 


   Urinary tract infection type: site unspecified   Hematuria presence: without 

hematuria   Qualified Code(s): N39.0 - Urinary tract infection, site not 

specified   


Is this a current diagnosis for this admission?: Yes   





(3) Diabetes mellitus type 2 in obese


Is this a current diagnosis for this admission?: Yes   





(4) Multiple sclerosis


Is this a current diagnosis for this admission?: Yes   





(5) Paroxysmal atrial fibrillation


Is this a current diagnosis for this admission?: Yes   





- Time


Time Spent with patient: 25-34 minutes


Level of Care: IMCU

## 2020-05-25 LAB
ALBUMIN SERPL-MCNC: 2.4 G/DL (ref 3.5–5)
ALP SERPL-CCNC: 110 U/L (ref 38–126)
ANION GAP SERPL CALC-SCNC: 6 MMOL/L (ref 5–19)
AST SERPL-CCNC: 23 U/L (ref 14–36)
BILIRUB DIRECT SERPL-MCNC: 0.1 MG/DL (ref 0–0.4)
BILIRUB SERPL-MCNC: 0.5 MG/DL (ref 0.2–1.3)
BUN SERPL-MCNC: 4 MG/DL (ref 7–20)
CALCIUM: 8.4 MG/DL (ref 8.4–10.2)
CHLORIDE SERPL-SCNC: 113 MMOL/L (ref 98–107)
CO2 SERPL-SCNC: 22 MMOL/L (ref 22–30)
GLUCOSE SERPL-MCNC: 87 MG/DL (ref 75–110)
POTASSIUM SERPL-SCNC: 2.9 MMOL/L (ref 3.6–5)
PROT SERPL-MCNC: 5.7 G/DL (ref 6.3–8.2)

## 2020-05-25 RX ADMIN — INSULIN LISPRO SCH: 100 INJECTION, SOLUTION INTRAVENOUS; SUBCUTANEOUS at 22:00

## 2020-05-25 RX ADMIN — Medication PRN MLS/HR: at 00:01

## 2020-05-25 RX ADMIN — INSULIN LISPRO SCH: 100 INJECTION, SOLUTION INTRAVENOUS; SUBCUTANEOUS at 08:42

## 2020-05-25 RX ADMIN — SODIUM CHLORIDE PRN MLS/HR: 9 INJECTION, SOLUTION INTRAVENOUS at 13:55

## 2020-05-25 RX ADMIN — ENOXAPARIN SODIUM SCH: 40 INJECTION SUBCUTANEOUS at 10:31

## 2020-05-25 RX ADMIN — INSULIN LISPRO SCH: 100 INJECTION, SOLUTION INTRAVENOUS; SUBCUTANEOUS at 17:04

## 2020-05-25 RX ADMIN — PANTOPRAZOLE SODIUM SCH MG: 40 TABLET, DELAYED RELEASE ORAL at 05:45

## 2020-05-25 RX ADMIN — INSULIN LISPRO SCH: 100 INJECTION, SOLUTION INTRAVENOUS; SUBCUTANEOUS at 12:27

## 2020-05-25 RX ADMIN — CEFTRIAXONE SCH MLS/HR: 1 INJECTION, SOLUTION INTRAVENOUS at 17:43

## 2020-05-25 RX ADMIN — SODIUM CHLORIDE PRN MLS/HR: 9 INJECTION, SOLUTION INTRAVENOUS at 05:46

## 2020-05-25 NOTE — PDOC PROGRESS REPORT
Subjective


Progress Note for:: 05/25/20


Subjective:: 





Patient seen by the bedside, she is back to baseline, ready for discharge home, 

we will DC IV fluid, check electrolytes


Reason For Visit: 


UTI,MS,DM TYPE 2,HYPOTHYROIDISM,VIT D DEFICIENCY








Physical Exam


Vital Signs: 


                                        











Temp Pulse Resp BP Pulse Ox


 


 98.1 F   88   12   126/67 H  98 


 


 05/25/20 12:14  05/25/20 13:00  05/22/20 19:34  05/25/20 13:00  05/25/20 12:14








                                 Intake & Output











 05/24/20 05/25/20 05/26/20





 06:59 06:59 06:59


 


Intake Total 3654 2497 994


 


Output Total 1375 2675 


 


Balance 2279 -178 994


 


Weight 74.2 kg 74.4 kg 











General appearance: PRESENT: no acute distress


Eye exam: PRESENT: PERRLA


Respiratory exam: PRESENT: clear to auscultation elver


Cardiovascular exam: PRESENT: +S1, +S2


GI/Abdominal exam: PRESENT: soft


Neurological exam: PRESENT: alert





Results


Laboratory Results: 


                                        





                                 05/24/20 18:00 





                                 05/24/20 18:00 





                                        











  05/24/20 05/24/20





  18:00 18:00


 


WBC  5.3 


 


RBC  3.84 


 


Hgb  10.4 L 


 


Hct  30.4 L 


 


MCV  79 L 


 


MCH  27.0 


 


MCHC  34.2 


 


RDW  15.7 H 


 


Plt Count  131 L 


 


Seg Neutrophils %  64.0 


 


Sodium   140.9


 


Potassium   3.1 L


 


Chloride   114 H


 


Carbon Dioxide   20 L


 


Anion Gap   7


 


BUN   5 L


 


Creatinine   0.56


 


Est GFR (African Amer)   > 60


 


Glucose   103


 


Calcium   8.5


 


Total Bilirubin   0.5


 


AST   21


 


Alkaline Phosphatase   108


 


Total Protein   5.4 L


 


Albumin   2.3 L








                                        





05/19/20 17:06   Blood   Blood Culture (PCR) - Final


                            Staphylococcus Species


05/19/20 17:06   Blood   Blood Culture - Final


                            Staphylococcus Hominis


05/19/20 15:46   Blood   Blood Culture - Final


                            NO GROWTH IN 5 DAYS





                                        











  05/19/20





  15:46


 


Troponin I  < 0.012











Impressions: 


                                        





Chest X-Ray  05/19/20 15:42


IMPRESSION:  1.  Low lung volumes.  Slight left lung base atelectasis.  No acute

pulmonary consolidation.


 














Assessment & Plan





- Diagnosis


(1) Sepsis


Qualifiers: 


   Sepsis type: sepsis due to unspecified organism   Severe sepsis shock status:

without septic shock 


Is this a current diagnosis for this admission?: Yes   


Plan: 


This is resolved








(2) Urinary tract infection


Qualifiers: 


   Urinary tract infection type: site unspecified   Hematuria presence: without 

hematuria   Qualified Code(s): N39.0 - Urinary tract infection, site not 

specified   


Is this a current diagnosis for this admission?: Yes   


Plan: 


Improved








(3) Diabetes mellitus type 2 in obese


Is this a current diagnosis for this admission?: Yes   





(4) Multiple sclerosis


Is this a current diagnosis for this admission?: Yes   





(5) Paroxysmal atrial fibrillation


Is this a current diagnosis for this admission?: Yes   


Plan: 


Discontinue Cardizem infusion, this is probably related to sepsis, no need for 

chronic anticoagulation








(6) Hypokalemia


Is this a current diagnosis for this admission?: Yes   


Plan: 


Replace potassium








- Time


Time Spent with patient: 25-34 minutes


Level of Care: IMCU

## 2020-05-26 VITALS — DIASTOLIC BLOOD PRESSURE: 63 MMHG | SYSTOLIC BLOOD PRESSURE: 112 MMHG

## 2020-05-26 LAB
ALBUMIN SERPL-MCNC: 2.4 G/DL (ref 3.5–5)
ALP SERPL-CCNC: 115 U/L (ref 38–126)
ANION GAP SERPL CALC-SCNC: 5 MMOL/L (ref 5–19)
AST SERPL-CCNC: 27 U/L (ref 14–36)
BILIRUB DIRECT SERPL-MCNC: 0 MG/DL (ref 0–0.4)
BILIRUB SERPL-MCNC: 0.4 MG/DL (ref 0.2–1.3)
BUN SERPL-MCNC: 4 MG/DL (ref 7–20)
CALCIUM: 9 MG/DL (ref 8.4–10.2)
CHLORIDE SERPL-SCNC: 113 MMOL/L (ref 98–107)
CO2 SERPL-SCNC: 25 MMOL/L (ref 22–30)
GLUCOSE SERPL-MCNC: 90 MG/DL (ref 75–110)
POTASSIUM SERPL-SCNC: 4.1 MMOL/L (ref 3.6–5)
PROT SERPL-MCNC: 5.8 G/DL (ref 6.3–8.2)

## 2020-05-26 RX ADMIN — INSULIN LISPRO SCH: 100 INJECTION, SOLUTION INTRAVENOUS; SUBCUTANEOUS at 17:03

## 2020-05-26 RX ADMIN — ENOXAPARIN SODIUM SCH: 40 INJECTION SUBCUTANEOUS at 10:26

## 2020-05-26 RX ADMIN — CEFTRIAXONE SCH MLS/HR: 1 INJECTION, SOLUTION INTRAVENOUS at 17:10

## 2020-05-26 RX ADMIN — PANTOPRAZOLE SODIUM SCH MG: 40 TABLET, DELAYED RELEASE ORAL at 06:32

## 2020-05-26 RX ADMIN — BACLOFEN PRN MG: 10 TABLET ORAL at 10:29

## 2020-05-26 RX ADMIN — INSULIN LISPRO SCH: 100 INJECTION, SOLUTION INTRAVENOUS; SUBCUTANEOUS at 08:28

## 2020-05-26 RX ADMIN — INSULIN LISPRO SCH: 100 INJECTION, SOLUTION INTRAVENOUS; SUBCUTANEOUS at 13:06

## 2020-05-26 NOTE — PDOC DISCHARGE SUMMARY
Impression





- Admit/DC Date/PCP


Admission Date/Primary Care Provider: 


  05/19/20 18:06





  MEG MCCARTHY





Discharge Date: 05/26/20





- Discharge Diagnosis


(1) Sepsis


Is this a current diagnosis for this admission?: Yes   





(2) Urinary tract infection


Is this a current diagnosis for this admission?: Yes   





(3) Diabetes mellitus type 2 in obese


Is this a current diagnosis for this admission?: Yes   





(4) Multiple sclerosis


Is this a current diagnosis for this admission?: Yes   





(5) Paroxysmal atrial fibrillation


Is this a current diagnosis for this admission?: Yes   





(6) Hypokalemia


Is this a current diagnosis for this admission?: Yes   





(7) Decubitus ulcer of sacral region, stage 1


Is this a current diagnosis for this admission?: Yes   





- Additional Information


Resuscitation Status: Full Code


Discharge Diet: As Tolerated


Discharge Activity: Activity As Tolerated


Referrals: 


MEG MCCARTHY MD [Primary Care Provider] - Follow up as needed


Home Medications: 








Baclofen [Baclofen 10 mg Tablet] 10 mg PO Q8HP PRN 04/06/20 


Ergocalciferol (Vitamin D2) [Drisdol 50,000 unit (1.25MG) Capsule] 50,000 unit 

PO WE@1000 04/06/20 











History of Present Illiness


History of Present Illness: 


VI PAIGE is a 54 year old female ,she  presented to the ED via EMS 

service with family .complain about fever and high heart rate. Patient denied 

any chest pain, difficulty with breathing, nausea, vomiting, or abdominal pain. 

She has history of multiple sclerosis with severe contracture deformity and 

presently bedbound. There is associated  urinary incontinence and reported skin 

breakdown in the sacral region. She admitted to associated weakness but denied 

dizziness, headache, and change in her mentation presently. Her initial ED 

evaluation was significant for leukocytosis and abnormal urinalysis suggestive 

of possible UTI and 





Hospital Course


Hospital Course: 


Patient was admitted for the management of urinary tract infection, sepsis due 

to UTI, she was treated empirically with IV ceftriaxone.  Urine culture grew 

Serratia with significant colony count, Urine culture also grew Proteus 

mirabilis with insignificant colony count,50,000.Hospital course was complicated

with paroxysmal atrial fibrillation with rapid regular response, she required 

Cardizem infusion for rate control, She has underlining multiple sclerosis 

essentially bedbound.  There was associated metabolic encephalopathy due to 

sepsis.The blood culture grew staph hominis felt to be due to contamination.  

She also had  hypokalemia that was replaced.Patient is much improved ready for d

ischarge home today





Physical Exam


Vital Signs: 


                                        











Temp Pulse Resp BP Pulse Ox


 


 98.4 F   103 H  17   112/63   100 


 


 05/26/20 18:26  05/26/20 18:26  05/26/20 18:26  05/26/20 18:26  05/26/20 18:26








                                 Intake & Output











 05/25/20 05/26/20 05/27/20





 06:59 06:59 06:59


 


Intake Total 2547 1512 712


 


Output Total 8473 3985 800


 


Balance -128 -1113 -88


 


Weight 74.4 kg 73.3 kg 











General appearance: PRESENT: no acute distress


Eye exam: PRESENT: PERRLA


Respiratory exam: PRESENT: clear to auscultation elver


Cardiovascular exam: PRESENT: +S1, +S2


GI/Abdominal exam: PRESENT: soft


Neurological exam: PRESENT: alert, CN II-XII grossly intact





Results


Laboratory Results: 


                                        











WBC  5.3 10^3/uL (4.0-10.5)   05/24/20  18:00    


 


RBC  3.84 10^6/uL (3.72-5.28)   05/24/20  18:00    


 


Hgb  10.4 g/dL (12.0-15.5)  L  05/24/20  18:00    


 


Hct  30.4 % (36.0-47.0)  L  05/24/20  18:00    


 


MCV  79 fl (80-97)  L  05/24/20  18:00    


 


MCH  27.0 pg (27.0-33.4)   05/24/20  18:00    


 


MCHC  34.2 g/dL (32.0-36.0)   05/24/20  18:00    


 


RDW  15.7 % (11.5-14.0)  H  05/24/20  18:00    


 


Plt Count  131 10^3/uL (150-450)  L  05/24/20  18:00    


 


Lymph % (Auto)  20.7 % (13-45)   05/24/20  18:00    


 


Mono % (Auto)  9.2 % (3-13)   05/24/20  18:00    


 


Eos % (Auto)  5.7 % (0-6)   05/24/20  18:00    


 


Baso % (Auto)  0.4 % (0-2)   05/24/20  18:00    


 


Absolute Neuts (auto)  3.4 10^3/uL (1.7-8.2)   05/24/20  18:00    


 


Absolute Lymphs (auto)  1.1 10^3/uL (0.5-4.7)   05/24/20  18:00    


 


Absolute Monos (auto)  0.5 10^3/uL (0.1-1.4)   05/24/20  18:00    


 


Absolute Eos (auto)  0.3 10^3/uL (0.0-0.6)   05/24/20  18:00    


 


Absolute Basos (auto)  0.0 10^3/uL (0.0-0.2)   05/24/20  18:00    


 


Total Counted  100   05/19/20  15:46    


 


Seg Neutrophils %  64.0 % (42-78)   05/24/20  18:00    


 


Seg Neuts % (Manual)  88 % (42-78)  H  05/19/20  15:46    


 


Band Neutrophils %  1 % (3-5)  L  05/19/20  15:46    


 


Lymphocytes % (Manual)  6 % (13-45)  L  05/19/20  15:46    


 


Monocytes % (Manual)  4 % (3-13)   05/19/20  15:46    


 


Eosinophils % (Manual)  1 % (0-6)   05/19/20  15:46    


 


Basophils % (Manual)  0 % (0-2)   05/19/20  15:46    


 


Abs Neuts (Manual)  13.5 10^3/uL (1.7-8.2)  H  05/19/20  15:46    


 


Abs Lymphs (Manual)  0.9 10^3/uL (0.5-4.7)   05/19/20  15:46    


 


Abs Monocytes (Manual)  0.6 10^3/uL (0.1-1.4)   05/19/20  15:46    


 


Absolute Eos (Manual)  0.2 10^3/uL (0.0-0.6)   05/19/20  15:46    


 


Abs Basophils (Manual)  0.0 10^3/uL (0.0-0.2)   05/19/20  15:46    


 


Platelet Comment  ADEQUATE   05/19/20  15:46    


 


Anisocytosis  SLIGHT   05/19/20  15:46    


 


PT  13.1 SEC (11.4-15.4)   05/19/20  15:46    


 


INR  0.99   05/19/20  15:46    


 


VBG pH  7.41  (7.30-7.42)   05/19/20  15:46    


 


VBG pCO2  34.6 mmHg (35-63)  L  05/19/20  15:46    


 


VBG HCO3  21.2 mmol/L (20-32)   05/19/20  15:46    


 


VBG Base Excess  -2.7 mmol/L  05/19/20  15:46    


 


Sodium  143.0 mmol/L (137-145)   05/26/20  05:59    


 


Potassium  4.1 mmol/L (3.6-5.0)  D 05/26/20  05:59    


 


Chloride  113 mmol/L ()  H  05/26/20  05:59    


 


Carbon Dioxide  25 mmol/L (22-30)   05/26/20  05:59    


 


Anion Gap  5  (5-19)   05/26/20  05:59    


 


BUN  4 mg/dL (7-20)  L  05/26/20  05:59    


 


Creatinine  0.50 mg/dL (0.52-1.25)  L  05/26/20  05:59    


 


Est GFR ( Amer)  > 60  (>60)   05/26/20  05:59    


 


Est GFR (MDRD) Non-Af  > 60  (>60)   05/26/20  05:59    


 


Glucose  90 mg/dL ()   05/26/20  05:59    


 


POC Glucose  75 mg/dL ()   05/26/20  16:41    


 


Lactic Acid  1.4 mmol/L (0.7-2.1)   05/19/20  22:16    


 


Calcium  9.0 mg/dL (8.4-10.2)   05/26/20  05:59    


 


Total Bilirubin  0.4 mg/dL (0.2-1.3)   05/26/20  05:59    


 


Direct Bilirubin  0.0 mg/dL (0.0-0.4)   05/26/20  05:59    


 


Neonat Total Bilirubin  Not Reportable   05/26/20  05:59    


 


Neonat Direct Bilirubin  Not Reportable   05/26/20  05:59    


 


Neonat Indirect Bili  Not Reportable   05/26/20  05:59    


 


AST  27 U/L (14-36)   05/26/20  05:59    


 


ALT  17 U/L (<35)   05/26/20  05:59    


 


Alkaline Phosphatase  115 U/L ()   05/26/20  05:59    


 


Troponin I  < 0.012 ng/mL  05/19/20  15:46    


 


Total Protein  5.8 g/dL (6.3-8.2)  L  05/26/20  05:59    


 


Albumin  2.4 g/dL (3.5-5.0)  L  05/26/20  05:59    


 


Urine Color  YELLOW   05/19/20  16:13    


 


Urine Appearance  CLOUDY   05/19/20  16:13    


 


Urine pH  5.0  (5.0-9.0)   05/19/20  16:13    


 


Ur Specific Gravity  1.012   05/19/20  16:13    


 


Urine Protein  30 mg/dL (NEGATIVE)  H  05/19/20  16:13    


 


Urine Glucose (UA)  NEGATIVE mg/dL (NEGATIVE)   05/19/20  16:13    


 


Urine Ketones  20 mg/dL (NEGATIVE)  H  05/19/20  16:13    


 


Urine Blood  MODERATE  (NEGATIVE)  H  05/19/20  16:13    


 


Urine Nitrite (Reflex)  POSITIVE  (NEGATIVE)  H  05/19/20  16:13    


 


Urine Bilirubin  NEGATIVE  (NEGATIVE)   05/19/20  16:13    


 


Urine Urobilinogen  NEGATIVE mg/dL (<2.0)   05/19/20  16:13    


 


Leukocyte Esterase Rfl  LARGE  (NEGATIVE)  H  05/19/20  16:13    


 


Urine RBC (Auto)  31 /HPF  05/19/20  16:13    


 


Urine Bacteria (Auto)  3+ /HPF  05/19/20  16:13    


 


Urine WBC (Reflex)  > 182 /HPF  05/19/20  16:13    


 


Urine WBC Clumps  MANY /HPF  05/19/20  16:13    


 


Squamous Epi Cells Auto  3 /HPF  05/19/20  16:13    


 


Urine Mucus (Auto)  RARE /LPF  05/19/20  16:13    


 


Urine Ascorbic Acid  NEGATIVE  (NEGATIVE)   05/19/20  16:13    


 


Time Trough Drawn  0556   05/23/20  05:56    


 


Vancomycin Trough  19.3 ug/mL (5.0-20.0)   05/23/20  05:56    








                                        











  05/19/20





  15:46


 


Troponin I  < 0.012











Impressions: 


                                        





Chest X-Ray  05/19/20 15:42


IMPRESSION:  1.  Low lung volumes.  Slight left lung base atelectasis.  No acute

pulmonary consolidation.


 














Stroke


Is this a Stroke Patient?: No





Acute Heart Failure





- **


Is this a Heart Failure Patient?: No

## 2020-06-28 ENCOUNTER — HOSPITAL ENCOUNTER (INPATIENT)
Dept: HOSPITAL 62 - ER | Age: 55
LOS: 8 days | Discharge: HOME HEALTH | DRG: 673 | End: 2020-07-06
Attending: INTERNAL MEDICINE | Admitting: INTERNAL MEDICINE
Payer: MEDICARE

## 2020-06-28 DIAGNOSIS — E03.9: ICD-10-CM

## 2020-06-28 DIAGNOSIS — L89.313: ICD-10-CM

## 2020-06-28 DIAGNOSIS — B95.8: ICD-10-CM

## 2020-06-28 DIAGNOSIS — N39.0: Primary | ICD-10-CM

## 2020-06-28 DIAGNOSIS — Z88.2: ICD-10-CM

## 2020-06-28 DIAGNOSIS — Z91.030: ICD-10-CM

## 2020-06-28 DIAGNOSIS — L89.152: ICD-10-CM

## 2020-06-28 DIAGNOSIS — Z74.01: ICD-10-CM

## 2020-06-28 DIAGNOSIS — Z86.711: ICD-10-CM

## 2020-06-28 DIAGNOSIS — L89.323: ICD-10-CM

## 2020-06-28 DIAGNOSIS — G35: ICD-10-CM

## 2020-06-28 DIAGNOSIS — E55.9: ICD-10-CM

## 2020-06-28 DIAGNOSIS — Z91.040: ICD-10-CM

## 2020-06-28 DIAGNOSIS — Z03.818: ICD-10-CM

## 2020-06-28 DIAGNOSIS — E11.9: ICD-10-CM

## 2020-06-28 DIAGNOSIS — E86.0: ICD-10-CM

## 2020-06-28 DIAGNOSIS — R31.9: ICD-10-CM

## 2020-06-28 DIAGNOSIS — G93.41: ICD-10-CM

## 2020-06-28 LAB
ADD MANUAL DIFF: NO
ALBUMIN SERPL-MCNC: 3.9 G/DL (ref 3.5–5)
ALP SERPL-CCNC: 125 U/L (ref 38–126)
ANION GAP SERPL CALC-SCNC: 9 MMOL/L (ref 5–19)
APPEARANCE UR: (no result)
APTT PPP: YELLOW S
AST SERPL-CCNC: 23 U/L (ref 14–36)
BARBITURATES UR QL SCN: NEGATIVE
BASE EXCESS BLDV CALC-SCNC: -7.5 MMOL/L
BASOPHILS # BLD AUTO: 0.1 10^3/UL (ref 0–0.2)
BASOPHILS NFR BLD AUTO: 0.6 % (ref 0–2)
BILIRUB DIRECT SERPL-MCNC: 0.1 MG/DL (ref 0–0.4)
BILIRUB SERPL-MCNC: 0.7 MG/DL (ref 0.2–1.3)
BILIRUB UR QL STRIP: NEGATIVE
BUN SERPL-MCNC: 32 MG/DL (ref 7–20)
CALCIUM: 10.7 MG/DL (ref 8.4–10.2)
CHLORIDE SERPL-SCNC: 112 MMOL/L (ref 98–107)
CO2 SERPL-SCNC: 19 MMOL/L (ref 22–30)
EOSINOPHIL # BLD AUTO: 0.1 10^3/UL (ref 0–0.6)
EOSINOPHIL NFR BLD AUTO: 1 % (ref 0–6)
ERYTHROCYTE [DISTWIDTH] IN BLOOD BY AUTOMATED COUNT: 15.1 % (ref 11.5–14)
ETHANOL SERPL-MCNC: < 10 MG/DL
GLUCOSE SERPL-MCNC: 146 MG/DL (ref 75–110)
GLUCOSE UR STRIP-MCNC: NEGATIVE MG/DL
HCO3 BLDV-SCNC: 17.7 MMOL/L (ref 20–32)
HCT VFR BLD CALC: 37.4 % (ref 36–47)
HGB BLD-MCNC: 12.4 G/DL (ref 12–15.5)
KETONES UR STRIP-MCNC: NEGATIVE MG/DL
LYMPHOCYTES # BLD AUTO: 1.2 10^3/UL (ref 0.5–4.7)
LYMPHOCYTES NFR BLD AUTO: 12.7 % (ref 13–45)
MCH RBC QN AUTO: 27 PG (ref 27–33.4)
MCHC RBC AUTO-ENTMCNC: 33.3 G/DL (ref 32–36)
MCV RBC AUTO: 81 FL (ref 80–97)
METHADONE UR QL SCN: NEGATIVE
MONOCYTES # BLD AUTO: 0.7 10^3/UL (ref 0.1–1.4)
MONOCYTES NFR BLD AUTO: 7.7 % (ref 3–13)
NEUTROPHILS # BLD AUTO: 7.4 10^3/UL (ref 1.7–8.2)
NEUTS SEG NFR BLD AUTO: 78 % (ref 42–78)
NITRITE UR QL STRIP: POSITIVE
PCO2 BLDV: 34.9 MMHG (ref 35–63)
PCP UR QL SCN: NEGATIVE
PH BLDV: 7.32 [PH] (ref 7.3–7.42)
PH UR STRIP: 6 [PH] (ref 5–9)
PLATELET # BLD: 310 10^3/UL (ref 150–450)
POTASSIUM SERPL-SCNC: 4 MMOL/L (ref 3.6–5)
PROT SERPL-MCNC: 8.1 G/DL (ref 6.3–8.2)
PROT UR STRIP-MCNC: 100 MG/DL
RBC # BLD AUTO: 4.6 10^6/UL (ref 3.72–5.28)
SP GR UR STRIP: 1.02
TOTAL CELLS COUNTED % (AUTO): 100 %
URINE AMPHETAMINES SCREEN: NEGATIVE
URINE BENZODIAZEPINES SCREEN: NEGATIVE
URINE COCAINE SCREEN: NEGATIVE
URINE MARIJUANA (THC) SCREEN: NEGATIVE
UROBILINOGEN UR-MCNC: NEGATIVE MG/DL (ref ?–2)
WBC # BLD AUTO: 9.5 10^3/UL (ref 4–10.5)

## 2020-06-28 PROCEDURE — 87635 SARS-COV-2 COVID-19 AMP PRB: CPT

## 2020-06-28 PROCEDURE — 81001 URINALYSIS AUTO W/SCOPE: CPT

## 2020-06-28 PROCEDURE — 80307 DRUG TEST PRSMV CHEM ANLYZR: CPT

## 2020-06-28 PROCEDURE — 87186 SC STD MICRODIL/AGAR DIL: CPT

## 2020-06-28 PROCEDURE — 99285 EMERGENCY DEPT VISIT HI MDM: CPT

## 2020-06-28 PROCEDURE — 36415 COLL VENOUS BLD VENIPUNCTURE: CPT

## 2020-06-28 PROCEDURE — 93005 ELECTROCARDIOGRAM TRACING: CPT

## 2020-06-28 PROCEDURE — 96365 THER/PROPH/DIAG IV INF INIT: CPT

## 2020-06-28 PROCEDURE — 87150 DNA/RNA AMPLIFIED PROBE: CPT

## 2020-06-28 PROCEDURE — 80053 COMPREHEN METABOLIC PANEL: CPT

## 2020-06-28 PROCEDURE — C9803 HOPD COVID-19 SPEC COLLECT: HCPCS

## 2020-06-28 PROCEDURE — 93010 ELECTROCARDIOGRAM REPORT: CPT

## 2020-06-28 PROCEDURE — 85025 COMPLETE CBC W/AUTO DIFF WBC: CPT

## 2020-06-28 PROCEDURE — 82803 BLOOD GASES ANY COMBINATION: CPT

## 2020-06-28 PROCEDURE — 87077 CULTURE AEROBIC IDENTIFY: CPT

## 2020-06-28 PROCEDURE — 83605 ASSAY OF LACTIC ACID: CPT

## 2020-06-28 PROCEDURE — 83735 ASSAY OF MAGNESIUM: CPT

## 2020-06-28 PROCEDURE — 87040 BLOOD CULTURE FOR BACTERIA: CPT

## 2020-06-28 RX ADMIN — SODIUM CHLORIDE PRN MLS/HR: 9 INJECTION, SOLUTION INTRAVENOUS at 21:59

## 2020-06-28 NOTE — EKG REPORT
SEVERITY:- ABNORMAL ECG -

SINUS TACHYCARDIA

BORDERLINE LEFT AXIS DEVIATION

BORDERLINE R WAVE PROGRESSION, ANTERIOR LEADS

NONSPECIFIC T ABNORMALITIES, DIFFUSE LEADS

:

Confirmed by: Ale Price 28-Jun-2020 18:37:57

## 2020-06-28 NOTE — ER DOCUMENT REPORT
Entered by KARMA BLAKE SCRIBE  20 1454 





Acting as scribe for:GUILLAUME ESPOSITO MD





ED General





- General


Chief Complaint: Altered Mental Status


Stated Complaint: ALTERED MENTAL STATUS


Time Seen by Provider: 20 14:47


Primary Care Provider: 


MEG MCCARTHY MD [Primary Care Provider] - Follow up as needed


Information source: Emergency Med Personnel, [facility] Records


Cannot obtain history due to: Altered mental status


Notes: 





This 55 year old female patient presents to the emergency department today with 

an altered mental status. Patient's history was obtained by [facility] records and EMS 

personnel. Patient had difficulty speaking and was not making sense this 

morning. Urine was reported to have a odor this morning. Patient has a history 

of urinary tract infections and has had similar episodes in the past. Patient 

was hospitalized x2 this year because of sepsis from UTI's. Patient is alert and

states she feels fine compared to earlier today.


TRAVEL OUTSIDE OF THE [place] IN LAST 30 DAYS: No





- Related Data


Allergies/Adverse Reactions: 


                                        





latex [Latex] Allergy (Intermediate, Verified 20 16:45)


   RASH/BLISTER


Sulfa (Sulfonamide Antibiotics) Allergy (Intermediate, Verified 20 16:45)


   RASH/HIVES


bee venom protein (honey bee) Allergy (Verified 20 16:45)


   Anaphylaxis











Past Medical History





- General


Information source: Emergency Med Personnel, [facility] Records


Cannot obtain history due to: Altered mental status





- Social History


Smoking Status: Unknown if Ever Smoked


Frequency of alcohol use: None


Drug Abuse: None


Family History: Reviewed & Not Pertinent





- Past Medical History


Cardiac Medical History: Reports: Hx Pulmonary Embolism - 2 years ago


Endocrine Medical History: Reports: Hx Diabetes Mellitus Type 2, Hx 

Hypothyroidism


Musculoskeletal Medical History: Reports Hx Multiple Sclerosis


Past Surgical History: Reports: Hx  Section - x2, Hx Cholecystectomy





- Immunizations


Immunizations up to date: Yes


Hx Diphtheria, Pertussis, Tetanus Vaccination: Yes





Review of Systems





- Review of Systems


-: Yes ROS unobtainable due to patient's medical condition





Physical Exam





- Vital signs


Vitals: 


                                        











Resp Pulse Ox


 


 29 H  96 


 


 20 12:48  20 12:48














- General


General appearance: Appears well, Alert





- HEENT


Head: Normocephalic, Atraumatic


Eyes: Normal


Pupils: PERRL





- Respiratory


Respiratory status: No respiratory distress


Chest status: Nontender


Breath sounds: Normal


Chest palpation: Normal





- Cardiovascular


Rhythm: Regular


Heart sounds: Normal auscultation


Murmur: No





- Abdominal


Inspection: Normal - Soft, Obese


Distension: No distension


Bowel sounds: Normal


Tenderness: Nontender





- Extremities


General lower extremity: Normal inspection.  No: Edema


Notes: 


Hypersupination of the right arm with minimal to no control. 





- Neurological


Neuro grossly intact: Yes


Cognition: Normal


Orientation: AAOx4


Speech: Normal





- Psychological


Associated symptoms: Normal affect, Normal mood





- Skin


Skin Temperature: Warm


Skin Moisture: Dry


Skin Color: Normal





Course





- Vital Signs


Vital signs: 


                                        











Temp Pulse Resp BP Pulse Ox


 


 98.2 F      22 H  117/84   98 


 


 20 13:14     20 15:00  20 14:05  20 15:00














- Laboratory


Result Diagrams: 


                                 20 12:55





                                 20 12:55


Laboratory results interpreted by me: 


                                        











  20





  12:55 12:55 12:55


 


RDW  15.1 H  


 


Lymph % (Auto)  12.7 L  


 


VBG pCO2   


 


VBG HCO3   


 


Chloride   112 H 


 


Carbon Dioxide   19 L 


 


BUN   32 H 


 


Glucose   146 H 


 


Lactic Acid    0.6 L


 


Calcium   10.7 H 


 


Urine Protein   


 


Urine Blood   


 


Urine Nitrite   


 


Ur Leukocyte Esterase   


 


Urine Ascorbic Acid   














  20





  13:51 14:57


 


RDW  


 


Lymph % (Auto)  


 


VBG pCO2   34.9 L


 


VBG HCO3   17.7 L


 


Chloride  


 


Carbon Dioxide  


 


BUN  


 


Glucose  


 


Lactic Acid  


 


Calcium  


 


Urine Protein  100 H 


 


Urine Blood  MODERATE H 


 


Urine Nitrite  POSITIVE H 


 


Ur Leukocyte Esterase  LARGE H 


 


Urine Ascorbic Acid  40 H 














- EKG Interpretation by Me


EKG shows normal: Sinus rhythm, Axis, Intervals.  abnormal: QRS Complexes - 

Borderline R wave progression in the anterior leads, ST-T Waves - Nonspecific 

diffuse T abnormalities


Rate: Tachycardia - 123


Axis/QRS: Left axis deviation


When compared to previous EKG there are: No significant change





- Consults


  ** Dr. Mccarthy


Time consulted: 15:50


Consulted provider: will see as inpatient - Medical floor admission.





Discharge





- Discharge


Clinical Impression: 


 Multiple sclerosis, primary chronic progressive, Tachycardia, Dehydration





Urinary tract infection


Qualifiers:


 Urinary tract infection type: site unspecified Hematuria presence: with 

hematuria Qualified Code(s): N39.0 - Urinary tract infection, site not 

specified; R31.9 - Hematuria, unspecified





Altered mental status


Qualifiers:


 Altered mental status type: unspecified Qualified Code(s): R41.82 - Altered 

mental status, unspecified





Condition: Good


Disposition: ADMITTED AS INPATIENT


Admitting Provider: Aura


Unit Admitted: Medical Floor


Referrals: 


MEG MCCARTHY MD [Primary Care Provider] - Follow up as needed





I personally performed the services described in the documentation, reviewed and

edited the documentation which was dictated to the scribe in my presence, and it

accurately records my words and actions.

## 2020-06-29 LAB
ADD MANUAL DIFF: NO
ALBUMIN SERPL-MCNC: 3.4 G/DL (ref 3.5–5)
ALP SERPL-CCNC: 110 U/L (ref 38–126)
ANION GAP SERPL CALC-SCNC: 11 MMOL/L (ref 5–19)
AST SERPL-CCNC: 18 U/L (ref 14–36)
BASOPHILS # BLD AUTO: 0 10^3/UL (ref 0–0.2)
BASOPHILS NFR BLD AUTO: 0.2 % (ref 0–2)
BILIRUB DIRECT SERPL-MCNC: 0.1 MG/DL (ref 0–0.4)
BILIRUB SERPL-MCNC: 0.5 MG/DL (ref 0.2–1.3)
BUN SERPL-MCNC: 23 MG/DL (ref 7–20)
CALCIUM: 10.5 MG/DL (ref 8.4–10.2)
CHLORIDE SERPL-SCNC: 112 MMOL/L (ref 98–107)
CO2 SERPL-SCNC: 19 MMOL/L (ref 22–30)
EOSINOPHIL # BLD AUTO: 0 10^3/UL (ref 0–0.6)
EOSINOPHIL NFR BLD AUTO: 0.3 % (ref 0–6)
ERYTHROCYTE [DISTWIDTH] IN BLOOD BY AUTOMATED COUNT: 15.5 % (ref 11.5–14)
GLUCOSE SERPL-MCNC: 92 MG/DL (ref 75–110)
HCT VFR BLD CALC: 38.2 % (ref 36–47)
HGB BLD-MCNC: 12.7 G/DL (ref 12–15.5)
LYMPHOCYTES # BLD AUTO: 1.3 10^3/UL (ref 0.5–4.7)
LYMPHOCYTES NFR BLD AUTO: 14.2 % (ref 13–45)
MCH RBC QN AUTO: 27.1 PG (ref 27–33.4)
MCHC RBC AUTO-ENTMCNC: 33.3 G/DL (ref 32–36)
MCV RBC AUTO: 81 FL (ref 80–97)
MONOCYTES # BLD AUTO: 0.9 10^3/UL (ref 0.1–1.4)
MONOCYTES NFR BLD AUTO: 9.7 % (ref 3–13)
NEUTROPHILS # BLD AUTO: 6.8 10^3/UL (ref 1.7–8.2)
NEUTS SEG NFR BLD AUTO: 75.6 % (ref 42–78)
PLATELET # BLD: 257 10^3/UL (ref 150–450)
POTASSIUM SERPL-SCNC: 4.2 MMOL/L (ref 3.6–5)
PROT SERPL-MCNC: 7.4 G/DL (ref 6.3–8.2)
RBC # BLD AUTO: 4.69 10^6/UL (ref 3.72–5.28)
TOTAL CELLS COUNTED % (AUTO): 100 %
WBC # BLD AUTO: 8.9 10^3/UL (ref 4–10.5)

## 2020-06-29 RX ADMIN — SODIUM CHLORIDE PRN MLS/HR: 9 INJECTION, SOLUTION INTRAVENOUS at 09:20

## 2020-06-29 RX ADMIN — LEVOFLOXACIN SCH MLS/HR: 500 INJECTION, SOLUTION INTRAVENOUS at 09:26

## 2020-06-29 RX ADMIN — PANTOPRAZOLE SODIUM SCH MG: 40 TABLET, DELAYED RELEASE ORAL at 06:12

## 2020-06-29 NOTE — PDOC H&P
History of Present Illness


Admission Date/PCP: 


  20 16:54





  MEG SHARI





History of Present Illness: 


VI PAIGE is a 55 year old female patient known to my practice who 

presented via EMS to the ED with report of altered mental state with difficulty 

speaking and incoherent in her conversation. Her symptoms started this morning 

as per report of the EMS personnel. Her initial ED evaluation was significant 

for tachypnea, tachycardia, hypercalcemia, hyperglycemia, pre-renal azotemia, 

and abnormal urinalysis suggestive of UTI. Her presentation is similar to her 

usual ED visit with UTI. She was treated with IV fluid and IV Levofloxacin based

on her prior culture results. Patient demonstrated improvement in her mental 

state after these interventions. she was advised hospitalization for further 

evaluation and management. Her morbidities as as listed below.





Past Medical History


Cardiac Medical History: Reports: Pulmonary Embolism - 2 years ago


   Denies: Atrial Fibrillation, Congestive Heart Failure, Myocardial Infarction,

Hyperlipidema, Hypertension


Pulmonary Medical History: 


   Denies: Asthma, Bronchitis, Chronic Obstructive Pulmonary Disease (COPD), 

Pneumonia, Respiratory Failure, Sleep Apnea, Tuberculosis


Neurological Medical History: 


   Denies: Migraine, Seizures


Endocrine Medical History: Reports: Diabetes Mellitus Type 2, Hypothyroidism


   Denies: Diabetes Mellitus Type 1


Renal/ Medical History: 


   Denies: End Stage Renal Disease


Malignancy Medical History: 


   Denies: Leukemia, Lung Cancer


GI Medical History: 


   Denies: Gastroesophageal Reflux Disease, Hiatal Hernia


Musculoskeltal Medical History: 


   Denies: Arthritis


Psychiatric Medical History: 


   Denies: Attention Deficit Hyperactivity Disorder, Bipolar Disorder, Dementia,

Depression


Hematology: Reports: Anemia


   Denies: Hemophilia, Sickle Cell Disease


Infectious Medical History: 


   Denies: HIV





Past Surgical History


Past Surgical History: Reports:  Section - x2, Cholecystectomy


   Denies: Appendectomy, Coronary Artery Bypass Graft, Gastric Bypass Surgery, 

Herniorrhaphy, Hysterectomy, Mastectomy, Pacemaker, Tonsillectomy, Tubal 

Ligation





Social History


Smoking Status: Never Smoker


Frequency of Alcohol Use: None


Hx Recreational Drug Use: No


Drugs: None


Hx Prescription Drug Abuse: No





- Advance Directive


Resuscitation Status: Full Code





Family History


Family History: Reviewed & Not Pertinent


Parental Family History Reviewed: Yes


Children Family History Reviewed: Yes


Sibling(s) Family History Reviewed.: Yes





Medication/Allergy


Home Medications: 








Acetaminophen [Tylenol 325 mg Tablet] 650 mg PO Q6HP PRN 20 


Baclofen [Baclofen 10 mg Tablet] 10 mg PO Q8HP PRN 20 








Allergies/Adverse Reactions: 


                                        





latex [Latex] Allergy (Intermediate, Verified 20 16:45)


   RASH/BLISTER


Sulfa (Sulfonamide Antibiotics) Allergy (Intermediate, Verified 20 16:45)


   RASH/HIVES


bee venom protein (honey bee) Allergy (Verified 20 16:45)


   Anaphylaxis











Review of Systems


Constitutional: PRESENT: chills


Eyes: ABSENT: visual disturbances


Ears: ABSENT: hearing changes


Nose, Mouth, and Throat: ABSENT: headache(s), mouth pain, sore throat, vertigo


Cardiovascular: ABSENT: chest pain, edema, palpitations


Respiratory: PRESENT: dyspnea.  ABSENT: cough, hemoptysis, sputum


Gastrointestinal: ABSENT: abdominal pain, constipation, diarrhea, hematemesis, 

hematochezia, nausea, vomiting


Genitourinary: PRESENT: difficulty urinating, dysuria, nocturia


Musculoskeletal: PRESENT: deformity - related to her advanced multiple sclerosis


Integumentary: ABSENT: rash, wounds


Neurological: PRESENT: abnormal gait - bed bound due to advance multiple 

sclerosis, abnormal speech, confusion, focal weakness - due to advance multiple 

sclerosis.  ABSENT: dizziness, syncope


Psychiatric: ABSENT: anxiety, depression, homidical ideation, suicidal ideation


Endocrine: ABSENT: cold intolerance, heat intolerance, polydipsia, polyuria


Allergic/Immunologic: ABSENT: seasonal rhinorrhea





Physical Exam


Vital Signs: 


                                        











Temp Pulse Resp BP Pulse Ox


 


 98.6 F   121 H  28 H  135/81 H  97 


 


 20 18:26  20 18:26  20 18:26  20 18:26  20 18:26








                                 Intake & Output











 20





 06:59 06:59 06:59


 


Intake Total   1150


 


Balance   1150


 


Weight   72 kg











Physical Exam: 





General appearance: PRESENT: no acute distress, disheveled


Head exam: PRESENT: atraumatic, normocephalic


Eye exam: PRESENT: conjunctiva pink, EOMI, PERRLA.  ABSENT: scleral icterus


Ear exam: PRESENT: normal external ear exam


Mouth exam: PRESENT: moist, tongue midline


Respiratory exam: PRESENT: clear to auscultation elver, decreased breath sounds - 

at lung bases


Cardiovascular exam: PRESENT: RRR, +S1, +S2, tachycardia.  ABSENT: diastolic 

murmur, rubs, systolic murmur


Vascular exam: ABSENT: pallor


GI/Abdominal exam: PRESENT: normal bowel sounds, soft.  ABSENT: distended, 

guarding, mass, organolmegaly, rebound, tenderness


Rectal exam: PRESENT: deferred


Extremities exam: ABSENT: pedal edema


Musculoskeletal exam: PRESENT: deformity - contracture deformities due to her 

multiple sclerosis


Neurological exam: PRESENT: alert, awake, oriented to person, oriented to place,

 oriented to time, oriented to situation


Psychiatric exam: PRESENT: appropriate affect, normal mood.  ABSENT: homicidal 

ideation, suicidal ideation


Skin exam: PRESENT: dry, rash - dry skin and seborrhea, warm, unstageable 

bilateral buttocks and sacral stage 2 pressure ulcers








Results


Laboratory Results: 


                                        





                                 20 12:55 





                                 20 12:55 





                                        











  20





  12:55 12:55 12:55


 


WBC  9.5  


 


RBC  4.60  


 


Hgb  12.4  


 


Hct  37.4  


 


MCV  81  


 


MCH  27.0  


 


MCHC  33.3  


 


RDW  15.1 H  


 


Plt Count  310  


 


Seg Neutrophils %  78.0  


 


VBG pH   


 


VBG pCO2   


 


VBG HCO3   


 


VBG Base Excess   


 


Sodium   140.2 


 


Potassium   4.0 


 


Chloride   112 H 


 


Carbon Dioxide   19 L 


 


Anion Gap   9 


 


BUN   32 H 


 


Creatinine   0.76 


 


Est GFR ( Amer)   > 60 


 


Glucose   146 H 


 


Lactic Acid    0.6 L


 


Calcium   10.7 H 


 


Magnesium   2.0 


 


Total Bilirubin   0.7 


 


AST   23 


 


Alkaline Phosphatase   125 


 


Total Protein   8.1 


 


Albumin   3.9 


 


Urine Color   


 


Urine Appearance   


 


Urine pH   


 


Ur Specific Gravity   


 


Urine Protein   


 


Urine Glucose (UA)   


 


Urine Ketones   


 


Urine Blood   


 


Urine Nitrite   


 


Ur Leukocyte Esterase   


 


Urine WBC (Auto)   


 


Urine RBC (Auto)   














  20





  13:51 14:57 15:55


 


WBC   


 


RBC   


 


Hgb   


 


Hct   


 


MCV   


 


MCH   


 


MCHC   


 


RDW   


 


Plt Count   


 


Seg Neutrophils %   


 


VBG pH   7.32 


 


VBG pCO2   34.9 L 


 


VBG HCO3   17.7 L 


 


VBG Base Excess   -7.5 


 


Sodium   


 


Potassium   


 


Chloride   


 


Carbon Dioxide   


 


Anion Gap   


 


BUN   


 


Creatinine   


 


Est GFR (African Amer)   


 


Glucose   


 


Lactic Acid    0.9


 


Calcium   


 


Magnesium   


 


Total Bilirubin   


 


AST   


 


Alkaline Phosphatase   


 


Total Protein   


 


Albumin   


 


Urine Color  YELLOW  


 


Urine Appearance  TURBID  


 


Urine pH  6.0  


 


Ur Specific Gravity  1.016  


 


Urine Protein  100 H  


 


Urine Glucose (UA)  NEGATIVE  


 


Urine Ketones  NEGATIVE  


 


Urine Blood  MODERATE H  


 


Urine Nitrite  POSITIVE H  


 


Ur Leukocyte Esterase  LARGE H  


 


Urine WBC (Auto)  >182  


 


Urine RBC (Auto)  43  














  20





  17:35


 


WBC 


 


RBC 


 


Hgb 


 


Hct 


 


MCV 


 


MCH 


 


MCHC 


 


RDW 


 


Plt Count 


 


Seg Neutrophils % 


 


VBG pH 


 


VBG pCO2 


 


VBG HCO3 


 


VBG Base Excess 


 


Sodium 


 


Potassium 


 


Chloride 


 


Carbon Dioxide 


 


Anion Gap 


 


BUN 


 


Creatinine 


 


Est GFR (African Amer) 


 


Glucose 


 


Lactic Acid  1.7


 


Calcium 


 


Magnesium 


 


Total Bilirubin 


 


AST 


 


Alkaline Phosphatase 


 


Total Protein 


 


Albumin 


 


Urine Color 


 


Urine Appearance 


 


Urine pH 


 


Ur Specific Gravity 


 


Urine Protein 


 


Urine Glucose (UA) 


 


Urine Ketones 


 


Urine Blood 


 


Urine Nitrite 


 


Ur Leukocyte Esterase 


 


Urine WBC (Auto) 


 


Urine RBC (Auto) 














Assessment & Plan





- Diagnosis


(1) Altered mental status


Qualifiers: 


   Altered mental status type: unspecified   Qualified Code(s): R41.82 - Altered

 mental status, unspecified   


Is this a current diagnosis for this admission?: Yes   


Plan: 


See admitting attending physician orders for details about care plan.








(2) Urinary tract infection


Qualifiers: 


   Urinary tract infection type: site unspecified   Hematuria presence: with 

hematuria   Qualified Code(s): N39.0 - Urinary tract infection, site not 

specified; R31.9 - Hematuria, unspecified   


Is this a current diagnosis for this admission?: Yes   


Plan: 


See admitting attending physician orders for details about care plan.








(3) Dehydration


Is this a current diagnosis for this admission?: Yes   


Plan: 


See admitting attending physician orders for details about care plan.








(4) Decubitus ulcer of buttock, unstageable


Qualifiers: 


   Laterality: unspecified laterality   Qualified Code(s): L89.300 - Pressure 

ulcer of unspecified buttock, unstageable   


Is this a current diagnosis for this admission?: Yes   


Plan: 


See admitting attending physician orders for details about care plan. Will 

request surgical consultation for wound evaluation for possible debridement.








(5) Multiple sclerosis, primary chronic progressive


Is this a current diagnosis for this admission?: Yes   


Plan: 


See admitting attending physician orders for details about care plan.








(6) Vitamin D deficiency


Is this a current diagnosis for this admission?: Yes   


Plan: 


See admitting attending physician orders for details about care plan.








- Time


Time Spent: 50 to 70 Minutes


Medications reviewed and adjusted accordingly: Yes


Anticipated discharge: Home with Homehealth





- Inpatient Certification


Based on my medical assessment, after consideration of the patient's 

comorbidities, presenting symptoms, or acuity I expect that the services needed 

warrant INPATIENT care.: Yes


I certify that my determination is in accordance with my understanding of 

Medicare's requirements for reasonable and necessary INPATIENT services [42 CFR 

412.3e].: Yes


Medical Necessity: Significant Comorbidiites Make Outpatient Treatment Too 

Risky, Need Close Monitoring Due to Risk of Patient Decompensation, Need For IV 

Fluids, Need for IV Antibiotics, Risk of Complication if Not Cared For in 

Hospital, Risk of Diagnosis Which Will Require Inpatient Eval/Care/Monitoring


Post Hospital Care: D/C Planner Documentation





- Plan Summary


Plan Summary: 





See admitting attending physician orders for details about care plan.

## 2020-06-29 NOTE — PDOC PROGRESS REPORT
Subjective


Progress Note for:: 06/29/20


Subjective:: 





Patient remain alter and appropriate in responses. Denied any chest pain or 

difficulty with breathing. No reported fever. No nausea, vomiting, or abdominal 

pain. PO intake remain a challenge.


Reason For Visit: 


METABOLIC ENCEPHALOPATHY,UTI,DEHYDRATION,ADVANCE








Physical Exam


Vital Signs: 


                                        











Temp Pulse Resp BP Pulse Ox


 


 98.4 F   125 H  15   129/68 H  100 


 


 06/29/20 12:13  06/29/20 12:13  06/29/20 12:13  06/29/20 12:13  06/29/20 12:13








                                 Intake & Output











 06/28/20 06/29/20 06/30/20





 06:59 06:59 06:59


 


Intake Total  1150 1340


 


Output Total  200 500


 


Balance  950 840


 


Weight  72 kg 











General appearance: PRESENT: no acute distress


Head exam: PRESENT: atraumatic, normocephalic


Eye exam: PRESENT: conjunctiva pink.  ABSENT: scleral icterus


Mouth exam: PRESENT: moist


Respiratory exam: PRESENT: clear to auscultation elver, decreased breath sounds - 

at lung bases


Cardiovascular exam: PRESENT: RRR, +S1, +S2.  ABSENT: diastolic murmur, rubs, 

systolic murmur


GI/Abdominal exam: PRESENT: normal bowel sounds, soft.  ABSENT: tenderness


Extremities exam: ABSENT: pedal edema


Musculoskeletal exam: PRESENT: deformity - related to multiple sclerosis 

spasticity


Neurological exam: PRESENT: alert, awake


Psychiatric exam: PRESENT: appropriate affect, normal mood.  ABSENT: homicidal 

ideation, suicidal ideation


Skin exam: PRESENT: dry, warm, other - bilateral buttock and sacral stage 2 

pressure ulcers.





Results


Laboratory Results: 


                                        





                                 06/29/20 06:26 





                                 06/29/20 06:26 





                                        











  06/28/20 06/28/20 06/28/20





  12:55 14:57 15:55


 


WBC   


 


RBC   


 


Hgb   


 


Hct   


 


MCV   


 


MCH   


 


MCHC   


 


RDW   


 


Plt Count   


 


Seg Neutrophils %   


 


VBG pH   7.32 


 


VBG pCO2   34.9 L 


 


VBG HCO3   17.7 L 


 


VBG Base Excess   -7.5 


 


Sodium   


 


Potassium   


 


Chloride   


 


Carbon Dioxide   


 


Anion Gap   


 


BUN   


 


Creatinine   


 


Est GFR (African Amer)   


 


Glucose   


 


Lactic Acid  0.6 L   0.9


 


Calcium   


 


Total Bilirubin   


 


AST   


 


Alkaline Phosphatase   


 


Total Protein   


 


Albumin   














  06/28/20 06/29/20 06/29/20





  17:35 06:26 06:26


 


WBC   8.9 


 


RBC   4.69 


 


Hgb   12.7 


 


Hct   38.2 


 


MCV   81 


 


MCH   27.1 


 


MCHC   33.3 


 


RDW   15.5 H 


 


Plt Count   257 


 


Seg Neutrophils %   75.6 


 


VBG pH   


 


VBG pCO2   


 


VBG HCO3   


 


VBG Base Excess   


 


Sodium    141.9


 


Potassium    4.2


 


Chloride    112 H


 


Carbon Dioxide    19 L


 


Anion Gap    11


 


BUN    23 H


 


Creatinine    0.64


 


Est GFR ( Amer)    > 60


 


Glucose    92


 


Lactic Acid  1.7  


 


Calcium    10.5 H


 


Total Bilirubin    0.5


 


AST    18


 


Alkaline Phosphatase    110


 


Total Protein    7.4


 


Albumin    3.4 L














Assessment & Plan





- Diagnosis


(1) Altered mental status


Qualifiers: 


   Altered mental status type: unspecified   Qualified Code(s): R41.82 - Altered

mental status, unspecified   


Is this a current diagnosis for this admission?: Yes   





(2) Urinary tract infection


Qualifiers: 


   Urinary tract infection type: site unspecified   Hematuria presence: with 

hematuria   Qualified Code(s): N39.0 - Urinary tract infection, site not s

pecified; R31.9 - Hematuria, unspecified   


Is this a current diagnosis for this admission?: Yes   





(3) Dehydration


Is this a current diagnosis for this admission?: Yes   





(4) Decubitus ulcer of buttock, unstageable


Qualifiers: 


   Laterality: unspecified laterality   Qualified Code(s): L89.300 - Pressure 

ulcer of unspecified buttock, unstageable   


Is this a current diagnosis for this admission?: Yes   





(5) Multiple sclerosis, primary chronic progressive


Is this a current diagnosis for this admission?: Yes   





(6) Vitamin D deficiency


Is this a current diagnosis for this admission?: Yes   





- Time


Time Spent with patient: 25-34 minutes


Level of Care: MEDICAL


Medications reviewed and adjusted accordingly: Yes


Anticipated discharge: Home with Homehealth


Within: Other





- Inpatient Certification


Based on my medical assessment, after consideration of the patient's 

comorbidities, presenting symptoms, or acuity I expect that the services needed 

warrant INPATIENT care.: Yes


I certify that my determination is in accordance with my understanding of 

Medicare's requirements for reasonable and necessary INPATIENT services [42 CFR 

412.3e].: Yes


Medical Necessity: Significant Comorbidiites Make Outpatient Treatment Too 

Risky, Need Close Monitoring Due to Risk of Patient Decompensation, Need For IV 

Fluids, Need for IV Antibiotics, Need for Surgery, Risk of Complication if Not 

Cared For in Hospital, Risk of Diagnosis Which Will Require Inpatient 

Eval/Care/Monitoring


Post Hospital Care: D/C Planner Documentation





- Plan Summary


Plan Summary: 





Continue current medication management. Follow up on surgicalist consultation 

request regarding need for possible pressure ulcer debridement.

## 2020-06-30 PROCEDURE — 0JB90ZZ EXCISION OF BUTTOCK SUBCUTANEOUS TISSUE AND FASCIA, OPEN APPROACH: ICD-10-PCS | Performed by: SURGERY

## 2020-06-30 RX ADMIN — ACETAMINOPHEN PRN MG: 325 TABLET ORAL at 05:40

## 2020-06-30 RX ADMIN — ACETAMINOPHEN PRN MG: 325 TABLET ORAL at 09:47

## 2020-06-30 RX ADMIN — PANTOPRAZOLE SODIUM SCH MG: 40 TABLET, DELAYED RELEASE ORAL at 05:40

## 2020-06-30 RX ADMIN — SODIUM CHLORIDE PRN MLS/HR: 9 INJECTION, SOLUTION INTRAVENOUS at 17:44

## 2020-06-30 RX ADMIN — LEVOFLOXACIN SCH MLS/HR: 500 INJECTION, SOLUTION INTRAVENOUS at 09:45

## 2020-06-30 NOTE — OPERATIVE REPORT
Nonrecallable Operative Report


DATE OF SURGERY: 06/30/20


PREOPERATIVE DIAGNOSIS: Bilateral ischial decubitus ulcers.


POSTOPERATIVE DIAGNOSIS: Same as above


OPERATION: 1.  Sharp, excisional debridement of skin and fatty soft tissue of 

the left ischial decubitus ulcer (2 cm x 2 cm x 2 cm debrided).  2.  Sharp, 

excisional debridement of skin and fatty soft tissue of the right ischial 

decubitus ulcer (2 cm x 2 cm x 2 cm debridement).


SURGEON: KELLEY PERRIN


ANESTHESIA: Local


TISSUE REMOVED OR ALTERED: Necrotic skin and fatty soft tissue.


COMPLICATIONS: 





None apparent


ESTIMATED BLOOD LOSS: 20 cc


PROCEDURE: 





Drains/implants: 4 x 4 gauze packing.





Procedure in detail: After informed consent was obtained, the patient was laid 

in the left lateral decubitus position in the hospital room.  The right ischial 

tuberosity decubitus was prepped and draped.  1% lidocaine with epinephrine was 

infiltrated into the skin.  All necrotic appearing skin and fatty soft tissue 

was debrided away sharply with an 11 blade scalpel.  This measured approximately

2 cm x 2 cm x 2 cm.  A dressing was then placed, using 4 x 4 gauze.  Attention 

was then turned to the left side.





The patient was rolled into the right lateral decubitus position.  The left 

ischial decubitus ulcer was prepped and draped.  1% lidocaine with epinephrine 

was used to anesthetize the skin.  An 11 blade scalpel was used to sharply and 

excisionally debride away necrotic skin and fatty soft tissue.  The area excised

measured approximately 2 cm x 2 cm x 2 cm.  A dressing was then placed, and the 

procedure was concluded.  All sponge, instrument, and needle counts were correct

x2.





Condition: Stable.

## 2020-06-30 NOTE — PDOC PROGRESS REPORT
Subjective


Progress Note for:: 06/30/20


Subjective:: 





Patient denied any chest pain or difficulty with breathing.No fever or chills. 

No nausea, vomiting or abdominal pan. Her p.o intake remain a challenge. She is 

schedule for buttock regions wound debridement today.


Reason For Visit: 


METABOLIC ENCEPHALOPATHY,UTI,DEHYDRATION,ADVANCE








Physical Exam


Vital Signs: 


                                        











Temp Pulse Resp BP Pulse Ox


 


 98.4 F   114 H  17   132/67 H  100 


 


 06/30/20 15:12  06/30/20 15:12  06/30/20 15:12  06/30/20 15:12  06/30/20 15:12








                                 Intake & Output











 06/29/20 06/30/20 07/01/20





 06:59 06:59 06:59


 


Intake Total 1150 2580 350


 


Output Total 200 1500 


 


Balance 950 1080 350


 


Weight 72 kg 82.3 kg 82.3 kg











General appearance: PRESENT: no acute distress


Head exam: PRESENT: atraumatic, normocephalic


Eye exam: PRESENT: conjunctiva pink, scleral icterus


Mouth exam: PRESENT: moist


Respiratory exam: PRESENT: clear to auscultation elver, decreased breath sounds - 

at lung bases


Cardiovascular exam: PRESENT: RRR.  ABSENT: diastolic murmur, rubs, systolic 

murmur


Vascular exam: ABSENT: pallor


GI/Abdominal exam: PRESENT: normal bowel sounds, soft.  ABSENT: tenderness


Musculoskeletal exam: PRESENT: deformity - due to her advanced mulytiple 

sclerosis


Neurological exam: PRESENT: altered, awake, oriented to person, oriented to 

place, oriented to situation


Psychiatric exam: PRESENT: appropriate affect, normal mood.  ABSENT: homicidal 

ideation, suicidal ideation


Skin exam: PRESENT: dry, warm, other - multiple sacral region pressure ulcers





Results


Laboratory Results: 


                                        





                                 06/29/20 06:26 





                                 06/29/20 06:26 





                                        





06/28/20 14:57   Blood   Blood Culture (PCR) - Final


                            Staphylococcus Species











Assessment & Plan





- Diagnosis


(1) Altered mental status


Qualifiers: 


   Altered mental status type: unspecified   Qualified Code(s): R41.82 - Altered

mental status, unspecified   


Is this a current diagnosis for this admission?: Yes   





(2) Urinary tract infection


Qualifiers: 


   Urinary tract infection type: site unspecified   Hematuria presence: with 

hematuria   Qualified Code(s): N39.0 - Urinary tract infection, site not 

specified; R31.9 - Hematuria, unspecified   


Is this a current diagnosis for this admission?: Yes   





(3) Dehydration


Is this a current diagnosis for this admission?: Yes   





(4) Decubitus ulcer of buttock, unstageable


Qualifiers: 


   Laterality: unspecified laterality   Qualified Code(s): L89.300 - Pressure 

ulcer of unspecified buttock, unstageable   


Is this a current diagnosis for this admission?: Yes   





(5) Multiple sclerosis, primary chronic progressive


Is this a current diagnosis for this admission?: Yes   





(6) Vitamin D deficiency


Is this a current diagnosis for this admission?: Yes   





- Time


Time Spent with patient: 25-34 minutes


Level of Care: MEDICAL


Medications reviewed and adjusted accordingly: Yes


Anticipated discharge: Home with Homehealth


Within: Other





- Inpatient Certification


Based on my medical assessment, after consideration of the patient's 

comorbidities, presenting symptoms, or acuity I expect that the services needed 

warrant INPATIENT care.: Yes


I certify that my determination is in accordance with my understanding of 

Medicare's requirements for reasonable and necessary INPATIENT services [42 CFR 

412.3e].: Yes


Medical Necessity: Significant Comorbidiites Make Outpatient Treatment Too Ris

ky, Need Close Monitoring Due to Risk of Patient Decompensation, Need For 

Continuous Telemetry Monitoring, Need for IV Antibiotics, Risk of Complication 

if Not Cared For in Hospital, Risk of Diagnosis Which Will Require Inpatient 

Eval/Care/Monitoring


Post Hospital Care: D/C Planner Documentation





- Plan Summary


Plan Summary: 





Continue current medication management. Obtain CBC with diff and CMP in am. 

Follow up with surgical recommendations.

## 2020-06-30 NOTE — PDOC CONSULTATION
Consultation


Consult Date: 20


Provider Consulted: SURGICAL SURGICALIST MD


Consult reason:: Bilateral heel decubitus ulcers





History of Present Illness


Admission Date/PCP: 


  20 16:54





  MEG MCCARTHY





History of Present Illness: 


VI PAIGE is a 55 year old female seen in consultation at the request of

Dr. Mccarthy.  Patient has multiple sclerosis, and is now bed ridden.  She has 

developed bilateral ischial tuberosity decubiti.  Surgery was consulted to 

evaluate the ulcers and determine the need for debridement.  The patient denies 

fevers, chills, nausea, vomiting, headache, dizziness, blurry vision, chest 

pain, shortness of breath.  The patient was admitted to the hospital with mental

status changes related to a urinary tract infection.  The symptoms have all but 

resolved.  The patient is mentating normally now.  The patient cannot see any 

ulcers, so she is unsure of any drainage or bleeding.  The nurse denies any foul

smell, purulent discharge, or erythema.








Past Medical History


Cardiac Medical History: Reports: Pulmonary Embolism - 2 years ago


   Denies: Atrial Fibrillation, Congestive Heart Failure, Myocardial Infarction,

Hyperlipidema, Hypertension


Pulmonary Medical History: 


   Denies: Asthma, Bronchitis, Chronic Obstructive Pulmonary Disease (COPD), 

Pneumonia, Respiratory Failure, Sleep Apnea, Tuberculosis


Neurological Medical History: 


   Denies: Migraine, Seizures


Endocrine Medical History: Reports: Diabetes Mellitus Type 2, Hypothyroidism


   Denies: Diabetes Mellitus Type 1


Renal/ Medical History: 


   Denies: End Stage Renal Disease


Malignancy Medical History: 


   Denies: Leukemia, Lung Cancer


GI Medical History: 


   Denies: Gastroesophageal Reflux Disease, Hiatal Hernia


Musculoskeltal Medical History: 


   Denies: Arthritis


Psychiatric Medical History: 


   Denies: Attention Deficit Hyperactivity Disorder, Bipolar Disorder, Dementia,

Depression


Hematology: Reports: Anemia


   Denies: Hemophilia, Sickle Cell Disease


Infectious Medical History: 


   Denies: HIV





Past Surgical History


Past Surgical History: Reports:  Section - x2, Cholecystectomy


   Denies: Appendectomy, Coronary Artery Bypass Graft, Gastric Bypass Surgery, 

Herniorrhaphy, Hysterectomy, Mastectomy, Pacemaker, Tonsillectomy, Tubal 

Ligation





Social History


Smoking Status: Never Smoker


Frequency of Alcohol Use: None


Hx Recreational Drug Use: No


Drugs: None


Hx Prescription Drug Abuse: No





- Advance Directive


Resuscitation Status: Full Code





Family History


Family History: Reviewed & Not Pertinent


Parental Family History Reviewed: Yes


Children Family History Reviewed: Yes


Sibling(s) Family History Reviewed.: Yes





Medication/Allergy


Home Medications: 








Acetaminophen [Tylenol 325 mg Tablet] 650 mg PO Q6HP PRN 20 


Baclofen [Baclofen 10 mg Tablet] 10 mg PO Q8HP PRN 20 








Allergies/Adverse Reactions: 


                                        





latex [Latex] Allergy (Intermediate, Verified 20 16:45)


   RASH/BLISTER


Sulfa (Sulfonamide Antibiotics) Allergy (Intermediate, Verified 20 16:45)


   RASH/HIVES


bee venom protein (honey bee) Allergy (Verified 20 16:45)


   Anaphylaxis











Review of Systems


Constitutional: PRESENT: weakness.  ABSENT: anorexia, chills, fatigue


Eyes: ABSENT: visual disturbances


Ears: ABSENT: hearing changes


Nose, Mouth, and Throat: ABSENT: sore throat


Cardiovascular: ABSENT: chest pain


Respiratory: ABSENT: cough


Gastrointestinal: ABSENT: abdominal pain, hematemesis, hematochezia, melena, 

nausea, vomiting


Genitourinary: PRESENT: dysuria


Musculoskeletal: ABSENT: back pain


Integumentary: PRESENT: wounds - Ischial tuberosity decubitus ulcers.  ABSENT: 

pruritus


Neurological: PRESENT: confusion - On admission, now resolved.  ABSENT: 

dizziness


Psychiatric: ABSENT: anxiety, depression


Endocrine: ABSENT: cold intolerance, heat intolerance


Hematologic/Lymphatic: ABSENT: easy bleeding, easy bruising





Physical Exam


Vital Signs: 


                                        











Temp Pulse Resp BP Pulse Ox


 


 98.4 F   125 H  15   129/68 H  100 


 


 20 12:13  20 12:13  20 12:13  20 12:13  20 12:13








                                 Intake & Output











 20





 06:59 06:59 06:59


 


Intake Total  1150 1340


 


Output Total  200 500


 


Balance  950 840


 


Weight  72 kg 











General appearance: PRESENT: no acute distress


Head exam: PRESENT: atraumatic


Eye exam: ABSENT: scleral icterus


Mouth exam: PRESENT: moist, neck supple


Neck exam: ABSENT: meningismus, tenderness, thyromegaly, tracheal deviation


Respiratory exam: PRESENT: unlabored.  ABSENT: tachypnea, wheezes


Cardiovascular exam: PRESENT: tachycardia


Pulses: PRESENT: normal radial pulses


Vascular exam: PRESENT: normal capillary refill


GI/Abdominal exam: PRESENT: soft.  ABSENT: rigid, tenderness


Rectal exam: PRESENT: deferred


Neurological exam: PRESENT: alert, awake, oriented to person, oriented to place,

 CN II-XII grossly intact


Psychiatric exam: ABSENT: agitated, anxious, depressed


Focused psych exam: ABSENT: delusional


Skin exam: PRESENT: other - Bilateral ischial tuberosity decubitus ulcers 

present.  There is a small amount of necrotic tissue present.  There is no 

erythema or purulent drainage present.





Results


Laboratory Results: 


                                        





                                 20 06:26 





                                 20 06:26 





                                        











  20





  17:35 06:26 06:26


 


WBC   8.9 


 


RBC   4.69 


 


Hgb   12.7 


 


Hct   38.2 


 


MCV   81 


 


MCH   27.1 


 


MCHC   33.3 


 


RDW   15.5 H 


 


Plt Count   257 


 


Seg Neutrophils %   75.6 


 


Sodium    141.9


 


Potassium    4.2


 


Chloride    112 H


 


Carbon Dioxide    19 L


 


Anion Gap    11


 


BUN    23 H


 


Creatinine    0.64


 


Est GFR ( Amer)    > 60


 


Glucose    92


 


Lactic Acid  1.7  


 


Calcium    10.5 H


 


Total Bilirubin    0.5


 


AST    18


 


Alkaline Phosphatase    110


 


Total Protein    7.4


 


Albumin    3.4 L














Assessment & Plan





- Diagnosis


(1) Decubitus ulcer of ischium, stage 3


Qualifiers: 


   Laterality: unspecified laterality   Qualified Code(s): L89.303 - Pressure 

ulcer of unspecified buttock, stage 3   


Is this a current diagnosis for this admission?: Yes   





- Plan Summary


Plan Summary: 





This is a 55-year-old female with bilateral ischial tuberosity decubitus ulcers.

  They are small, and appear noninfected.  There is unhealthy skin present.  I 

believe she would benefit from local debridement.  They appear to extend into 

the subcutaneous fatty tissue.  I believe they are stage III.  The patient has 

agreed to bedside debridement.  I will plan for bedside debridement today or 

tomorrow.  I will have the nursing staff perform damp to dry dressing changes 

after debridement has been performed.  It is imperative that the patient receive

 every 2 hour turning, as well as supplemental nutrition (protein supplements). 

 Surgery will follow.

## 2020-07-01 LAB
ADD MANUAL DIFF: NO
ALBUMIN SERPL-MCNC: 2.8 G/DL (ref 3.5–5)
ALP SERPL-CCNC: 88 U/L (ref 38–126)
ANION GAP SERPL CALC-SCNC: 6 MMOL/L (ref 5–19)
AST SERPL-CCNC: 22 U/L (ref 14–36)
BASOPHILS # BLD AUTO: 0 10^3/UL (ref 0–0.2)
BASOPHILS NFR BLD AUTO: 0.5 % (ref 0–2)
BILIRUB DIRECT SERPL-MCNC: 0.1 MG/DL (ref 0–0.4)
BILIRUB SERPL-MCNC: 0.5 MG/DL (ref 0.2–1.3)
BUN SERPL-MCNC: 9 MG/DL (ref 7–20)
CALCIUM: 9.4 MG/DL (ref 8.4–10.2)
CHLORIDE SERPL-SCNC: 115 MMOL/L (ref 98–107)
CO2 SERPL-SCNC: 18 MMOL/L (ref 22–30)
EOSINOPHIL # BLD AUTO: 0.1 10^3/UL (ref 0–0.6)
EOSINOPHIL NFR BLD AUTO: 1.4 % (ref 0–6)
ERYTHROCYTE [DISTWIDTH] IN BLOOD BY AUTOMATED COUNT: 15.4 % (ref 11.5–14)
GLUCOSE SERPL-MCNC: 101 MG/DL (ref 75–110)
HCT VFR BLD CALC: 30.2 % (ref 36–47)
HGB BLD-MCNC: 10.1 G/DL (ref 12–15.5)
LYMPHOCYTES # BLD AUTO: 1.1 10^3/UL (ref 0.5–4.7)
LYMPHOCYTES NFR BLD AUTO: 17.1 % (ref 13–45)
MCH RBC QN AUTO: 27.1 PG (ref 27–33.4)
MCHC RBC AUTO-ENTMCNC: 33.5 G/DL (ref 32–36)
MCV RBC AUTO: 81 FL (ref 80–97)
MONOCYTES # BLD AUTO: 0.6 10^3/UL (ref 0.1–1.4)
MONOCYTES NFR BLD AUTO: 8.8 % (ref 3–13)
NEUTROPHILS # BLD AUTO: 4.7 10^3/UL (ref 1.7–8.2)
NEUTS SEG NFR BLD AUTO: 72.2 % (ref 42–78)
PLATELET # BLD: 266 10^3/UL (ref 150–450)
POTASSIUM SERPL-SCNC: 3.6 MMOL/L (ref 3.6–5)
PROT SERPL-MCNC: 6.5 G/DL (ref 6.3–8.2)
RBC # BLD AUTO: 3.73 10^6/UL (ref 3.72–5.28)
TOTAL CELLS COUNTED % (AUTO): 100 %
WBC # BLD AUTO: 6.5 10^3/UL (ref 4–10.5)

## 2020-07-01 RX ADMIN — SODIUM CHLORIDE PRN MLS/HR: 9 INJECTION, SOLUTION INTRAVENOUS at 13:28

## 2020-07-01 RX ADMIN — ACETAMINOPHEN PRN MG: 325 TABLET ORAL at 05:55

## 2020-07-01 RX ADMIN — PANTOPRAZOLE SODIUM SCH MG: 40 TABLET, DELAYED RELEASE ORAL at 05:55

## 2020-07-01 RX ADMIN — LEVOFLOXACIN SCH MLS/HR: 500 INJECTION, SOLUTION INTRAVENOUS at 09:28

## 2020-07-01 RX ADMIN — ACETAMINOPHEN PRN MG: 325 TABLET ORAL at 17:22

## 2020-07-01 NOTE — PDOC PROGRESS REPORT
Subjective


Reason For Visit: 


METABOLIC ENCEPHALOPATHY,UTI,DEHYDRATION,ADVANCE


Patient laying supine, unable to move any of her extremities.





Physical Exam


Vital Signs: 


                                        











Temp Pulse Resp BP Pulse Ox


 


 98.5 F   112 H  17   132/66 H  100 


 


 07/01/20 11:50  07/01/20 11:50  07/01/20 08:00  07/01/20 11:50  07/01/20 11:50








                                 Intake & Output











 06/30/20 07/01/20 07/02/20





 06:59 06:59 06:59


 


Intake Total 2580 1600 100


 


Output Total 1500 800 900


 


Balance 1080 800 -800


 


Weight 82.3 kg 83.8 kg 











Skin exam: PRESENT: other - Wounds examined.  Patient has each 3 bilateral issue

wounds; status post debridement at bedside yesterday; packing removed, wounds 

irrigated and repacked.





Results


Laboratory Results: 


                                        





                                 07/01/20 05:43 





                                 07/01/20 05:43 





                                        











  07/01/20 07/01/20





  05:43 05:43


 


WBC  6.5 


 


RBC  3.73 


 


Hgb  10.1 L D 


 


Hct  30.2 L 


 


MCV  81 


 


MCH  27.1 


 


MCHC  33.5 


 


RDW  15.4 H 


 


Plt Count  266 


 


Seg Neutrophils %  72.2 


 


Sodium   139.3


 


Potassium   3.6


 


Chloride   115 H


 


Carbon Dioxide   18 L


 


Anion Gap   6


 


BUN   9


 


Creatinine   0.47 L


 


Est GFR (African Amer)   > 60


 


Glucose   101


 


Calcium   9.4


 


Total Bilirubin   0.5


 


AST   22


 


Alkaline Phosphatase   88


 


Total Protein   6.5


 


Albumin   2.8 L








                                        





06/28/20 14:57   Blood   Blood Culture (PCR) - Final


                            Staphylococcus Species











Assessment & Plan





- Diagnosis


(1) Decubitus ulcer of ischium, stage 3


Qualifiers: 


   Laterality: unspecified laterality   Qualified Code(s): L89.303 - Pressure 

ulcer of unspecified buttock, stage 3   


Is this a current diagnosis for this admission?: Yes   


Plan: 


Impression: Patient with a bilateral stage III initial decubiti, status post 

bedside debridement, wound is cleaning up nicely.  No indication for the 

debridement





Plan:





1.  We will start dressing changes; orders written and reviewed with nursing 

staff





2.  Pressure offloading would be of use will be difficult to manage with this 

patient who has 0 mobility.





3.  Surgery will sign off; reconsult if clinically indicated.

## 2020-07-01 NOTE — PDOC PROGRESS REPORT
Subjective


Progress Note for:: 07/01/20


Subjective:: 





No chest pain or difficulty with breathing. No fever or chills. No nausea, 

vomiting or abdominal pan. 


Reason For Visit: 


METABOLIC ENCEPHALOPATHY,UTI,DEHYDRATION,ADVANCE








Physical Exam


Vital Signs: 


                                        











Temp Pulse Resp BP Pulse Ox


 


 98.5 F   112 H  17   132/66 H  100 


 


 07/01/20 11:50  07/01/20 11:50  07/01/20 08:00  07/01/20 11:50  07/01/20 11:50








                                 Intake & Output











 06/30/20 07/01/20 07/02/20





 06:59 06:59 06:59


 


Intake Total 2580 1600 150


 


Output Total 1500 800 900


 


Balance 1080 800 -750


 


Weight 82.3 kg 83.8 kg 











Physical Exam: 





General appearance: PRESENT: no acute distress


Head exam: PRESENT: atraumatic, normocephalic


Eye exam: PRESENT: conjunctiva pink, pallor, scleral icterus


Mouth exam: PRESENT: moist


Respiratory exam: PRESENT: clear to auscultation elver, decreased breath sounds - 

at lung bases


Cardiovascular exam: PRESENT: RRR.  ABSENT: diastolic murmur, rubs, systolic 

murmur


GI/Abdominal exam: PRESENT: normal bowel sounds, soft.  ABSENT: tenderness


Musculoskeletal exam: PRESENT: deformity - due to her advanced multiple 

sclerosis


Neurological exam: PRESENT: altered, awake, oriented to person, oriented to 

place, oriented to situation


Psychiatric exam: PRESENT: appropriate affect, normal mood.  ABSENT: homicidal i

deation, suicidal ideation


Skin exam: PRESENT: dry, warm, other - multiple sacral region pressure ulcers





Results


Laboratory Results: 


                                        





                                 07/01/20 05:43 





                                 07/01/20 05:43 





                                        











  07/01/20 07/01/20





  05:43 05:43


 


WBC  6.5 


 


RBC  3.73 


 


Hgb  10.1 L D 


 


Hct  30.2 L 


 


MCV  81 


 


MCH  27.1 


 


MCHC  33.5 


 


RDW  15.4 H 


 


Plt Count  266 


 


Seg Neutrophils %  72.2 


 


Sodium   139.3


 


Potassium   3.6


 


Chloride   115 H


 


Carbon Dioxide   18 L


 


Anion Gap   6


 


BUN   9


 


Creatinine   0.47 L


 


Est GFR (African Amer)   > 60


 


Glucose   101


 


Calcium   9.4


 


Total Bilirubin   0.5


 


AST   22


 


Alkaline Phosphatase   88


 


Total Protein   6.5


 


Albumin   2.8 L








                                        





06/28/20 14:57   Blood   Blood Culture (PCR) - Final


                            Staphylococcus Species











Assessment & Plan





- Diagnosis


(1) Altered mental status


Qualifiers: 


   Altered mental status type: unspecified   Qualified Code(s): R41.82 - Altered

mental status, unspecified   


Is this a current diagnosis for this admission?: Yes   





(2) Urinary tract infection


Qualifiers: 


   Urinary tract infection type: site unspecified   Hematuria presence: with 

hematuria   Qualified Code(s): N39.0 - Urinary tract infection, site not 

specified; R31.9 - Hematuria, unspecified   


Is this a current diagnosis for this admission?: Yes   





(3) Dehydration


Is this a current diagnosis for this admission?: Yes   





(4) Decubitus ulcer of buttock, unstageable


Qualifiers: 


   Laterality: unspecified laterality   Qualified Code(s): L89.300 - Pressure 

ulcer of unspecified buttock, unstageable   


Is this a current diagnosis for this admission?: Yes   





(5) Multiple sclerosis, primary chronic progressive


Is this a current diagnosis for this admission?: Yes   





(6) Vitamin D deficiency


Is this a current diagnosis for this admission?: Yes   





- Time


Time Spent with patient: 25-34 minutes


Level of Care: IMCU


Medications reviewed and adjusted accordingly: Yes


Anticipated discharge: Home with Homehealth


Within: Other





- Inpatient Certification


Based on my medical assessment, after consideration of the patient's 

comorbidities, presenting symptoms, or acuity I expect that the services needed 

warrant INPATIENT care.: Yes


I certify that my determination is in accordance with my understanding of 

Medicare's requirements for reasonable and necessary INPATIENT services [42 CFR 

412.3e].: Yes


Medical Necessity: Significant Comorbidiites Make Outpatient Treatment Too 

Risky, Need Close Monitoring Due to Risk of Patient Decompensation, Need For IV 

Fluids, Need for IV Antibiotics, Risk of Complication if Not Cared For in 

Hospital, Risk of Diagnosis Which Will Require Inpatient Eval/Care/Monitoring


Post Hospital Care: D/C Planner Documentation





- Plan Summary


Plan Summary: 





Continue current medication management. Follow up on culture definite findings.

## 2020-07-02 RX ADMIN — PANTOPRAZOLE SODIUM SCH: 40 TABLET, DELAYED RELEASE ORAL at 08:51

## 2020-07-02 RX ADMIN — SODIUM CHLORIDE PRN MLS/HR: 9 INJECTION, SOLUTION INTRAVENOUS at 08:54

## 2020-07-02 RX ADMIN — LEVOFLOXACIN SCH MLS/HR: 500 INJECTION, SOLUTION INTRAVENOUS at 09:23

## 2020-07-02 NOTE — PDOC PROGRESS REPORT
Subjective


Progress Note for:: 07/02/20


Subjective:: 





No chest pain or difficulty with breathing. No fever or chills. No nausea, 

vomiting or abdominal pan. There is concern about inability of the DME supplier 

to meet patient's need for alternating air mattress during this July 4th 

holiday.


Reason For Visit: 


METABOLIC ENCEPHALOPATHY,UTI,DEHYDRATION,ADVANCE








Physical Exam


Vital Signs: 


                                        











Temp Pulse Resp BP Pulse Ox


 


 98.5 F   106 H  16   141/77 H  100 


 


 07/02/20 16:08  07/02/20 16:08  07/02/20 16:08  07/02/20 16:08  07/02/20 16:08








                                 Intake & Output











 07/01/20 07/02/20 07/03/20





 06:59 06:59 06:59


 


Intake Total 1600 1487 380


 


Output Total 800 1200 300


 


Balance 800 287 80


 


Weight 83.8 kg 84.4 kg 84.4 kg











Physical Exam: 





General appearance: PRESENT: no acute distress


Head exam: PRESENT: atraumatic, normocephalic


Eye exam: PRESENT: conjunctiva pink, pallor, scleral icterus


Mouth exam: PRESENT: moist


Respiratory exam: PRESENT: clear to auscultation elver, decreased breath sounds - 

at lung bases


Cardiovascular exam: PRESENT: RRR.  ABSENT: diastolic murmur, rubs, systolic 

murmur


GI/Abdominal exam: PRESENT: normal bowel sounds, soft.  ABSENT: tenderness


Musculoskeletal exam: PRESENT: deformity - due to her advanced multiple 

sclerosis


Neurological exam: PRESENT: altered, awake, oriented to person, oriented to 

place, oriented to situation


Psychiatric exam: PRESENT: appropriate affect, normal mood.  ABSENT: homicidal 

ideation, suicidal ideation


Skin exam: PRESENT: dry, warm, other - multiple sacral region pressure ulcers 

s/p surgical debridement.








Results


Laboratory Results: 


                                        





                                 07/01/20 05:43 





                                 07/01/20 05:43 





                                        





06/28/20 14:57   Blood   Blood Culture (PCR) - Final


                            Staphylococcus Species


06/28/20 14:57   Blood   Blood Culture - Final


                            Staphylococcus Capitis











Assessment & Plan





- Diagnosis


(1) Altered mental status


Qualifiers: 


   Altered mental status type: unspecified   Qualified Code(s): R41.82 - Altered

mental status, unspecified   


Is this a current diagnosis for this admission?: Yes   





(2) Urinary tract infection


Qualifiers: 


   Urinary tract infection type: site unspecified   Hematuria presence: with 

hematuria   Qualified Code(s): N39.0 - Urinary tract infection, site not 

specified; R31.9 - Hematuria, unspecified   


Is this a current diagnosis for this admission?: Yes   





(3) Dehydration


Is this a current diagnosis for this admission?: Yes   





(4) Decubitus ulcer of buttock, unstageable


Qualifiers: 


   Laterality: unspecified laterality   Qualified Code(s): L89.300 - Pressure 

ulcer of unspecified buttock, unstageable   


Is this a current diagnosis for this admission?: Yes   





(5) Multiple sclerosis, primary chronic progressive


Is this a current diagnosis for this admission?: Yes   





(6) Vitamin D deficiency


Is this a current diagnosis for this admission?: Yes   





- Time


Time Spent with patient: 25-34 minutes


Level of Care: IMCU


Medications reviewed and adjusted accordingly: Yes


Anticipated discharge: Home with Homehealth


Within: Other





- Inpatient Certification


Based on my medical assessment, after consideration of the patient's 

comorbidities, presenting symptoms, or acuity I expect that the services needed 

warrant INPATIENT care.: Yes


I certify that my determination is in accordance with my understanding of 

Medicare's requirements for reasonable and necessary INPATIENT services [42 CFR 

412.3e].: Yes


Medical Necessity: Significant Comorbidiites Make Outpatient Treatment Too 

Risky, Need Close Monitoring Due to Risk of Patient Decompensation, Need For IV 

Fluids, Need For Continuous Telemetry Monitoring, Need for IV Antibiotics, Risk 

of Complication if Not Cared For in Hospital, Risk of Diagnosis Which Will 

Require Inpatient Eval/Care/Monitoring


Post Hospital Care: D/C Planner Documentation





- Plan Summary


Plan Summary: 





Continue current medication management.

## 2020-07-03 RX ADMIN — LEVOFLOXACIN SCH MLS/HR: 500 INJECTION, SOLUTION INTRAVENOUS at 09:30

## 2020-07-03 RX ADMIN — PANTOPRAZOLE SODIUM SCH: 40 TABLET, DELAYED RELEASE ORAL at 05:52

## 2020-07-03 RX ADMIN — SODIUM CHLORIDE PRN MLS/HR: 9 INJECTION, SOLUTION INTRAVENOUS at 17:27

## 2020-07-03 NOTE — PDOC PROGRESS REPORT
Subjective


Progress Note for:: 07/03/20


Subjective:: 





Patient seen by the bedside, history of multiple sclerosis


Reason For Visit: 


METABOLIC ENCEPHALOPATHY,UTI,DEHYDRATION,ADVANCE








Physical Exam


Vital Signs: 


                                        











Temp Pulse Resp BP Pulse Ox


 


 98.6 F   105 H  18   127/73 H  100 


 


 07/03/20 15:51  07/03/20 15:51  07/03/20 15:51  07/03/20 15:51  07/03/20 15:51








                                 Intake & Output











 07/02/20 07/03/20 07/04/20





 06:59 06:59 06:59


 


Intake Total 1487 990 100


 


Output Total 1200 1550 


 


Balance 287 -560 100


 


Weight 84.4 kg 85.2 kg 











General appearance: PRESENT: no acute distress


Respiratory exam: PRESENT: clear to auscultation elver


Cardiovascular exam: PRESENT: +S1, +S2


GI/Abdominal exam: PRESENT: soft


Neurological exam: PRESENT: alert





Results


Laboratory Results: 


                                        





                                 07/01/20 05:43 





                                 07/01/20 05:43 





                                        





06/28/20 15:55   Blood   Blood Culture - Final


                            NO GROWTH IN 5 DAYS











Assessment & Plan





- Diagnosis


(1) Multiple sclerosis


Is this a current diagnosis for this admission?: Yes   





(2) Pressure ulcer of unspecified buttock, stage 3


Qualifiers: 


   Laterality: unspecified laterality   Qualified Code(s): L89.303 - Pressure ul

cer of unspecified buttock, stage 3   


Is this a current diagnosis for this admission?: Yes   





(3) Urinary tract infection


Qualifiers: 


   Urinary tract infection type: site unspecified   Hematuria presence: with 

hematuria   Qualified Code(s): N39.0 - Urinary tract infection, site not 

specified; R31.9 - Hematuria, unspecified   


Is this a current diagnosis for this admission?: Yes   





- Time


Time Spent with patient: 15-24 minutes


Level of Care: IMCU





- Plan Summary


Plan Summary: 





Continue treatment

## 2020-07-04 RX ADMIN — SODIUM CHLORIDE PRN MLS/HR: 9 INJECTION, SOLUTION INTRAVENOUS at 16:25

## 2020-07-04 RX ADMIN — LEVOFLOXACIN SCH MLS/HR: 500 INJECTION, SOLUTION INTRAVENOUS at 09:44

## 2020-07-04 RX ADMIN — PANTOPRAZOLE SODIUM SCH: 40 TABLET, DELAYED RELEASE ORAL at 05:03

## 2020-07-04 RX ADMIN — ACETAMINOPHEN PRN MG: 325 TABLET ORAL at 11:29

## 2020-07-04 RX ADMIN — SODIUM CHLORIDE PRN MLS/HR: 9 INJECTION, SOLUTION INTRAVENOUS at 07:00

## 2020-07-04 NOTE — PDOC PROGRESS REPORT
Subjective


Progress Note for:: 07/04/20


Subjective:: 





Patient seen by the bedside, history of multiple sclerosis


Reason For Visit: 


METABOLIC ENCEPHALOPATHY,UTI,DEHYDRATION,ADVANCE








Physical Exam


Vital Signs: 


                                        











Temp Pulse Resp BP Pulse Ox


 


 98.5 F   108 H  16   146/85 H  100 


 


 07/04/20 15:37  07/04/20 15:37  07/04/20 15:37  07/04/20 15:37  07/04/20 15:37








                                 Intake & Output











 07/03/20 07/04/20 07/05/20





 06:59 06:59 06:59


 


Intake Total 1990 1100 1160


 


Output Total 1550 850 300


 


Balance 440 250 860


 


Weight 85.2 kg 86.4 kg 











General appearance: PRESENT: no acute distress


Eye exam: PRESENT: PERRLA


Respiratory exam: PRESENT: clear to auscultation elver


Cardiovascular exam: PRESENT: +S1, +S2


GI/Abdominal exam: PRESENT: soft


Neurological exam: PRESENT: alert





Results


Laboratory Results: 


                                        





                                 07/01/20 05:43 





                                 07/01/20 05:43 





                                        





06/28/20 15:55   Blood   Blood Culture - Final


                            NO GROWTH IN 5 DAYS











Assessment & Plan





- Diagnosis


(1) Multiple sclerosis


Is this a current diagnosis for this admission?: Yes   





(2) Pressure ulcer of unspecified buttock, stage 3


Qualifiers: 


   Laterality: unspecified laterality   Qualified Code(s): L89.303 - Pressure 

ulcer of unspecified buttock, stage 3   


Is this a current diagnosis for this admission?: Yes   





(3) Urinary tract infection


Qualifiers: 


   Urinary tract infection type: site unspecified   Hematuria presence: with 

hematuria   Qualified Code(s): N39.0 - Urinary tract infection, site not 

specified; R31.9 - Hematuria, unspecified   


Is this a current diagnosis for this admission?: Yes   





- Time


Time Spent with patient: 15-24 minutes


Level of Care: MEDICAL

## 2020-07-05 RX ADMIN — SODIUM CHLORIDE PRN MLS/HR: 9 INJECTION, SOLUTION INTRAVENOUS at 07:47

## 2020-07-05 RX ADMIN — LEVOFLOXACIN SCH MLS/HR: 500 INJECTION, SOLUTION INTRAVENOUS at 10:24

## 2020-07-05 RX ADMIN — PANTOPRAZOLE SODIUM SCH: 40 TABLET, DELAYED RELEASE ORAL at 05:16

## 2020-07-05 RX ADMIN — SODIUM CHLORIDE PRN MLS/HR: 9 INJECTION, SOLUTION INTRAVENOUS at 19:39

## 2020-07-05 NOTE — PDOC PROGRESS REPORT
Subjective


Progress Note for:: 07/05/20


Subjective:: 





Patient seen by the bedside, history of multiple sclerosis


Reason For Visit: 


METABOLIC ENCEPHALOPATHY,UTI,DEHYDRATION,ADVANCE








Physical Exam


Vital Signs: 


                                        











Temp Pulse Resp BP Pulse Ox


 


 98.9 F   118 H  17   152/85 H  100 


 


 07/05/20 07:05  07/05/20 07:05  07/05/20 07:05  07/05/20 07:05  07/05/20 07:05








                                 Intake & Output











 07/04/20 07/05/20 07/06/20





 06:59 06:59 06:59


 


Intake Total 1100 2160 100


 


Output Total 850 1200 


 


Balance 250 960 100


 


Weight 86.4 kg 86.4 kg 











General appearance: PRESENT: no acute distress


Eye exam: PRESENT: PERRLA


Respiratory exam: PRESENT: clear to auscultation elver


Cardiovascular exam: PRESENT: +S1, +S2


GI/Abdominal exam: PRESENT: soft





Results


Laboratory Results: 


                                        





                                 07/01/20 05:43 





                                 07/01/20 05:43 











Assessment & Plan





- Diagnosis


(1) Multiple sclerosis


Is this a current diagnosis for this admission?: Yes   





(2) Pressure ulcer of unspecified buttock, stage 3


Qualifiers: 


   Laterality: unspecified laterality   Qualified Code(s): L89.303 - Pressure 

ulcer of unspecified buttock, stage 3   


Is this a current diagnosis for this admission?: Yes   





(3) Urinary tract infection


Qualifiers: 


   Urinary tract infection type: site unspecified   Hematuria presence: with 

hematuria   Qualified Code(s): N39.0 - Urinary tract infection, site not 

specified; R31.9 - Hematuria, unspecified   


Is this a current diagnosis for this admission?: Yes   





- Time


Time Spent with patient: 25-34 minutes


Level of Care: MEDICAL

## 2020-07-06 VITALS — DIASTOLIC BLOOD PRESSURE: 73 MMHG | SYSTOLIC BLOOD PRESSURE: 130 MMHG

## 2020-07-06 RX ADMIN — SODIUM CHLORIDE PRN MLS/HR: 9 INJECTION, SOLUTION INTRAVENOUS at 08:52

## 2020-07-06 RX ADMIN — PANTOPRAZOLE SODIUM SCH: 40 TABLET, DELAYED RELEASE ORAL at 05:28

## 2020-07-06 NOTE — PDOC DISCHARGE SUMMARY
Impression





- Admit/DC Date/PCP


Admission Date/Primary Care Provider: 


  06/28/20 16:54





  MEG MCCARTHY





Discharge Date: 07/06/20





- Discharge Diagnosis


(1) Altered mental status


Is this a current diagnosis for this admission?: Yes   





(2) Urinary tract infection


Is this a current diagnosis for this admission?: Yes   





(3) Dehydration


Is this a current diagnosis for this admission?: Yes   





(4) Decubitus ulcer of buttock, unstageable


Is this a current diagnosis for this admission?: Yes   





(5) Multiple sclerosis, primary chronic progressive


Is this a current diagnosis for this admission?: Yes   





(6) Vitamin D deficiency


Is this a current diagnosis for this admission?: Yes   





- Assessment


Summary: 


Patient presented with alteration in her mental state due to urinary tract infec

tion and dehydration. She responded well to IV fluid and IV Levofloxacin 

administration. She remain on both while on admission with continued improvement

in her symptoms. Her presenting bilateral sacral decubitii were surgically 

debrided and granulating satisfactorily at this time. Presently both are stage 3

pressure ulcer level. She will be discharged home today with home health nurse 

service for wound dressing and alternating air mattress for wound management. 

She will follow up with the surgical team and myself in the office as instructed

upon discharge.





- Additional Information


Resuscitation Status: Full Code


Referrals: 


SURGICALIST,SURGICAL MD [ACTIVE STAFF] - 


MEG MCCARTHY MD [Primary Care Provider] - 07/15/20 10:00 am


Prescriptions: 


Whey Protein Isolate [Beneprotein] 227 gm PO TID #90 powder


Vitamin A [Vitamin A 10,000 Unit Capsule] 10,000 unit PO DAILY #30 capsule


Ascorbic Acid [Vitamin C] 1,000 mg PO DAILY #30 tablet


Zinc Sulfate [Zinc-220 Capsule] 220 mg PO DAILY #30 capsule


Home Medications: 








Acetaminophen [Tylenol 325 mg Tablet] 650 mg PO Q6HP PRN 06/29/20 


Baclofen [Baclofen 10 mg Tablet] 10 mg PO Q8HP PRN 06/29/20 


Ascorbic Acid [Vitamin C] 1,000 mg PO DAILY #30 tablet 07/06/20 


Vitamin A [Vitamin A 10,000 Unit Capsule] 10,000 unit PO DAILY #30 capsule 

07/06/20 


Whey Protein Isolate [Beneprotein] 227 gm PO TID #90 powder 07/06/20 


Zinc Sulfate [Zinc-220 Capsule] 220 mg PO DAILY #30 capsule 07/06/20 











History of Present Illiness


History of Present Illness: 


VI PAIGE is a 55 year old female patient known to my practice who 

presented via EMS to the ED with report of altered mental state with difficulty 

speaking and incoherent in her conversation. Her symptoms started this morning 

as per report of the EMS personnel.  Her initial ED evaluation was significant 

for tachypnea, tachycardia, hypercalcemia, hyperglycemia, pre-renal azotemia, 

and abnormal urinalysis suggestive of UTI. Her presentation is similar to her 

usual ED visit with UTI. She was treated with IV fluid and IV Levofloxacin based

on her prior culture results. Patient demonstrated improvement in her mental 

state after these interventions. she was advised hospitalization for further 

evaluation and management. Her morbidities as as listed below.





Hospital Course


Hospital Course: 


Patient presented with alteration in her mental state due to urinary tract 

infection and dehydration. She responded well to IV fluid and IV Levofloxacin 

administration. She remain on both while on admission with continued improvement

in her symptoms. Her presenting bilateral sacral decubitii were surgically 

debrided and granulating satisfactorily at this time. Presently both are stage 3

pressure ulcer level. She will be discharged home today with home health nurse 

service for wound dressing and alternating air mattress for wound management. 

She will follow up with the surgical team and myself in the office as instructed

upon discharge.





Physical Exam


Vital Signs: 


                                        











Temp Pulse Resp BP Pulse Ox


 


 97.6 F   117 H  16   144/86 H  100 


 


 07/06/20 08:35  07/06/20 08:35  07/06/20 08:35  07/06/20 08:35  07/06/20 08:35








                                 Intake & Output











 07/05/20 07/06/20 07/07/20





 06:59 06:59 06:59


 


Intake Total 2160 2100 


 


Output Total 1200 300 


 


Balance 960 1800 


 


Weight 86.4 kg 86.4 kg 











General appearance: PRESENT: no acute distress


Head exam: PRESENT: atraumatic, normocephalic


Eye exam: PRESENT: conjunctiva pink, pallor, scleral icterus


Mouth exam: PRESENT: moist


Respiratory exam: PRESENT: clear to auscultation elver, decreased breath sounds - 

at lung bases


Cardiovascular exam: PRESENT: RRR.  ABSENT: diastolic murmur, rubs, systolic 

murmur


GI/Abdominal exam: PRESENT: normal bowel sounds, soft.  ABSENT: tenderness


Musculoskeletal exam: PRESENT: deformity - due to her advanced multiple 

sclerosis


Neurological exam: PRESENT: altered, awake, oriented to person, oriented to 

place, oriented to situation


Psychiatric exam: PRESENT: appropriate affect, normal mood.  ABSENT: homicidal 

ideation, suicidal ideation


Skin exam: PRESENT: dry, warm, other - multiple sacral region pressure ulcers 

s/p surgical debridement.











Results


Laboratory Results: 


                                        











WBC  6.5 10^3/uL (4.0-10.5)   07/01/20  05:43    


 


RBC  3.73 10^6/uL (3.72-5.28)   07/01/20  05:43    


 


Hgb  10.1 g/dL (12.0-15.5)  L D 07/01/20  05:43    


 


Hct  30.2 % (36.0-47.0)  L  07/01/20  05:43    


 


MCV  81 fl (80-97)   07/01/20  05:43    


 


MCH  27.1 pg (27.0-33.4)   07/01/20  05:43    


 


MCHC  33.5 g/dL (32.0-36.0)   07/01/20  05:43    


 


RDW  15.4 % (11.5-14.0)  H  07/01/20  05:43    


 


Plt Count  266 10^3/uL (150-450)   07/01/20  05:43    


 


Lymph % (Auto)  17.1 % (13-45)   07/01/20  05:43    


 


Mono % (Auto)  8.8 % (3-13)   07/01/20  05:43    


 


Eos % (Auto)  1.4 % (0-6)   07/01/20  05:43    


 


Baso % (Auto)  0.5 % (0-2)   07/01/20  05:43    


 


Absolute Neuts (auto)  4.7 10^3/uL (1.7-8.2)   07/01/20  05:43    


 


Absolute Lymphs (auto)  1.1 10^3/uL (0.5-4.7)   07/01/20  05:43    


 


Absolute Monos (auto)  0.6 10^3/uL (0.1-1.4)   07/01/20  05:43    


 


Absolute Eos (auto)  0.1 10^3/uL (0.0-0.6)   07/01/20  05:43    


 


Absolute Basos (auto)  0.0 10^3/uL (0.0-0.2)   07/01/20  05:43    


 


Seg Neutrophils %  72.2 % (42-78)   07/01/20  05:43    


 


VBG pH  7.32  (7.30-7.42)   06/28/20  14:57    


 


VBG pCO2  34.9 mmHg (35-63)  L  06/28/20  14:57    


 


VBG HCO3  17.7 mmol/L (20-32)  L  06/28/20  14:57    


 


VBG Base Excess  -7.5 mmol/L  06/28/20  14:57    


 


Sodium  139.3 mmol/L (137-145)   07/01/20  05:43    


 


Potassium  3.6 mmol/L (3.6-5.0)   07/01/20  05:43    


 


Chloride  115 mmol/L ()  H  07/01/20  05:43    


 


Carbon Dioxide  18 mmol/L (22-30)  L  07/01/20  05:43    


 


Anion Gap  6  (5-19)   07/01/20  05:43    


 


BUN  9 mg/dL (7-20)   07/01/20  05:43    


 


Creatinine  0.47 mg/dL (0.52-1.25)  L  07/01/20  05:43    


 


Est GFR ( Amer)  > 60  (>60)   07/01/20  05:43    


 


Est GFR (MDRD) Non-Af  > 60  (>60)   07/01/20  05:43    


 


Glucose  101 mg/dL ()   07/01/20  05:43    


 


Lactic Acid  1.7 mmol/L (0.7-2.1)   06/28/20  17:35    


 


Calcium  9.4 mg/dL (8.4-10.2)   07/01/20  05:43    


 


Magnesium  2.0 mg/dL (1.6-2.3)   06/28/20  12:55    


 


Total Bilirubin  0.5 mg/dL (0.2-1.3)   07/01/20  05:43    


 


Direct Bilirubin  0.1 mg/dL (0.0-0.4)   07/01/20  05:43    


 


Neonat Total Bilirubin  Not Reportable   07/01/20  05:43    


 


Neonat Direct Bilirubin  Not Reportable   07/01/20  05:43    


 


Neonat Indirect Bili  Not Reportable   07/01/20  05:43    


 


AST  22 U/L (14-36)   07/01/20  05:43    


 


ALT  16 U/L (<35)   07/01/20  05:43    


 


Alkaline Phosphatase  88 U/L ()   07/01/20  05:43    


 


Total Protein  6.5 g/dL (6.3-8.2)   07/01/20  05:43    


 


Albumin  2.8 g/dL (3.5-5.0)  L  07/01/20  05:43    


 


Urine Color  YELLOW   06/28/20  13:51    


 


Urine Appearance  TURBID   06/28/20  13:51    


 


Urine pH  6.0  (5.0-9.0)   06/28/20  13:51    


 


Ur Specific Gravity  1.016   06/28/20  13:51    


 


Urine Protein  100 mg/dL (NEGATIVE)  H  06/28/20  13:51    


 


Urine Glucose (UA)  NEGATIVE mg/dL (NEGATIVE)   06/28/20  13:51    


 


Urine Ketones  NEGATIVE mg/dL (NEGATIVE)   06/28/20  13:51    


 


Urine Blood  MODERATE  (NEGATIVE)  H  06/28/20  13:51    


 


Urine Nitrite  POSITIVE  (NEGATIVE)  H  06/28/20  13:51    


 


Urine Bilirubin  NEGATIVE  (NEGATIVE)   06/28/20  13:51    


 


Urine Urobilinogen  NEGATIVE mg/dL (<2.0)   06/28/20  13:51    


 


Ur Leukocyte Esterase  LARGE  (NEGATIVE)  H  06/28/20  13:51    


 


Urine WBC (Auto)  >182 /HPF  06/28/20  13:51    


 


Urine RBC (Auto)  43 /HPF  06/28/20  13:51    


 


Urine Bacteria (Auto)  3+ /HPF  06/28/20  13:51    


 


Urine WBC Clumps  MANY /HPF  06/28/20  13:51    


 


Squamous Epi Cells Auto  23 /HPF  06/28/20  13:51    


 


Urine Mucus (Auto)  OCC /LPF  06/28/20  13:51    


 


Urine Ascorbic Acid  40  (NEGATIVE)  H  06/28/20  13:51    


 


Urine Opiates Screen  UNCONFIRMED POSITIVE   06/28/20  13:51    


 


Urine Methadone Screen  NEGATIVE   06/28/20  13:51    


 


Ur Barbiturates Screen  NEGATIVE   06/28/20  13:51    


 


Ur Phencyclidine Scrn  NEGATIVE   06/28/20  13:51    


 


Ur Amphetamines Screen  NEGATIVE   06/28/20  13:51    


 


U Benzodiazepines Scrn  NEGATIVE   06/28/20  13:51    


 


Urine Cocaine Screen  NEGATIVE   06/28/20  13:51    


 


U Marijuana (THC) Screen  NEGATIVE   06/28/20  13:51    


 


Serum Alcohol  < 10 mg/dL (NONE DETECTED)   06/28/20  12:55    


 


SARS-CoV-2 (PCR)  NEGATIVE  (NEGATIVE)   06/30/20  15:20    














Plan


Health Concerns: 


Poor nutritional status with high risk of worsening decubitii ulcers and 

readmission.


Plan of Treatment: 


Wound management with nutritional supplementation.


Goals: 


Improve wound healing conditions, improve nutritional status, and reduce level 

of readmission risk.


Time Spent: Greater than 30 Minutes - I discussed her post discharge care plan 

with nursing staff and discharge planner.





Stroke


Is this a Stroke Patient?: No





Acute Heart Failure





- **


Is this a Heart Failure Patient?: No

## 2020-07-08 ENCOUNTER — HOSPITAL ENCOUNTER (INPATIENT)
Dept: HOSPITAL 62 - ER | Age: 55
LOS: 16 days | Discharge: HOME HEALTH | DRG: 871 | End: 2020-07-24
Attending: INTERNAL MEDICINE | Admitting: HOSPITALIST
Payer: MEDICARE

## 2020-07-08 DIAGNOSIS — F03.90: ICD-10-CM

## 2020-07-08 DIAGNOSIS — N20.2: ICD-10-CM

## 2020-07-08 DIAGNOSIS — Z74.01: ICD-10-CM

## 2020-07-08 DIAGNOSIS — N13.6: ICD-10-CM

## 2020-07-08 DIAGNOSIS — Z87.440: ICD-10-CM

## 2020-07-08 DIAGNOSIS — E88.09: ICD-10-CM

## 2020-07-08 DIAGNOSIS — Z91.030: ICD-10-CM

## 2020-07-08 DIAGNOSIS — G35: ICD-10-CM

## 2020-07-08 DIAGNOSIS — Z91.040: ICD-10-CM

## 2020-07-08 DIAGNOSIS — E03.9: ICD-10-CM

## 2020-07-08 DIAGNOSIS — G93.41: ICD-10-CM

## 2020-07-08 DIAGNOSIS — E87.6: ICD-10-CM

## 2020-07-08 DIAGNOSIS — A41.9: Primary | ICD-10-CM

## 2020-07-08 DIAGNOSIS — J69.0: ICD-10-CM

## 2020-07-08 DIAGNOSIS — Z90.49: ICD-10-CM

## 2020-07-08 DIAGNOSIS — L89.313: ICD-10-CM

## 2020-07-08 DIAGNOSIS — L89.153: ICD-10-CM

## 2020-07-08 DIAGNOSIS — B37.41: ICD-10-CM

## 2020-07-08 DIAGNOSIS — Z86.711: ICD-10-CM

## 2020-07-08 DIAGNOSIS — E11.9: ICD-10-CM

## 2020-07-08 DIAGNOSIS — Z88.2: ICD-10-CM

## 2020-07-08 DIAGNOSIS — R09.02: ICD-10-CM

## 2020-07-08 DIAGNOSIS — L89.323: ICD-10-CM

## 2020-07-08 DIAGNOSIS — B96.5: ICD-10-CM

## 2020-07-08 DIAGNOSIS — G83.9: ICD-10-CM

## 2020-07-08 DIAGNOSIS — L30.8: ICD-10-CM

## 2020-07-08 DIAGNOSIS — D64.9: ICD-10-CM

## 2020-07-08 LAB
ABSOLUTE LYMPHOCYTES# (MANUAL): 0.7 10^3/UL (ref 0.5–4.7)
ABSOLUTE MONOCYTES # (MANUAL): 1.3 10^3/UL (ref 0.1–1.4)
ADD MANUAL DIFF: YES
ALBUMIN SERPL-MCNC: 2.4 G/DL (ref 3.5–5)
ALP SERPL-CCNC: 176 U/L (ref 38–126)
ANION GAP SERPL CALC-SCNC: 5 MMOL/L (ref 5–19)
ANISOCYTOSIS BLD QL SMEAR: SLIGHT
APPEARANCE UR: (no result)
APTT PPP: YELLOW S
AST SERPL-CCNC: 53 U/L (ref 14–36)
BASE EXCESS BLDV CALC-SCNC: 0.9 MMOL/L
BASOPHILS NFR BLD MANUAL: 0 % (ref 0–2)
BILIRUB DIRECT SERPL-MCNC: 0.2 MG/DL (ref 0–0.4)
BILIRUB SERPL-MCNC: 0.7 MG/DL (ref 0.2–1.3)
BILIRUB UR QL STRIP: NEGATIVE
BUN SERPL-MCNC: 12 MG/DL (ref 7–20)
CALCIUM: 8.9 MG/DL (ref 8.4–10.2)
CHLORIDE SERPL-SCNC: 110 MMOL/L (ref 98–107)
CO2 SERPL-SCNC: 26 MMOL/L (ref 22–30)
EOSINOPHIL NFR BLD MANUAL: 1 % (ref 0–6)
ERYTHROCYTE [DISTWIDTH] IN BLOOD BY AUTOMATED COUNT: 15.5 % (ref 11.5–14)
GLUCOSE SERPL-MCNC: 131 MG/DL (ref 75–110)
GLUCOSE UR STRIP-MCNC: NEGATIVE MG/DL
HCO3 BLDV-SCNC: 25.5 MMOL/L (ref 20–32)
HCT VFR BLD CALC: 30.2 % (ref 36–47)
HGB BLD-MCNC: 9.9 G/DL (ref 12–15.5)
INR PPP: 1.06
KETONES UR STRIP-MCNC: NEGATIVE MG/DL
MCH RBC QN AUTO: 25.4 PG (ref 27–33.4)
MCHC RBC AUTO-ENTMCNC: 32.7 G/DL (ref 32–36)
MCV RBC AUTO: 78 FL (ref 80–97)
MONOCYTES % (MANUAL): 6 % (ref 3–13)
NEUTS BAND NFR BLD MANUAL: 3 % (ref 3–5)
PCO2 BLDV: 40.6 MMHG (ref 35–63)
PH BLDV: 7.42 [PH] (ref 7.3–7.42)
PH UR STRIP: 5 [PH] (ref 5–9)
PLATELET # BLD: 330 10^3/UL (ref 150–450)
PLATELET CLUMP BLD QL SMEAR: PRESENT
PLATELET COMMENT: ADEQUATE
POTASSIUM SERPL-SCNC: 2.8 MMOL/L (ref 3.6–5)
PROT SERPL-MCNC: 6.1 G/DL (ref 6.3–8.2)
PROT UR STRIP-MCNC: 30 MG/DL
PROTHROMBIN TIME: 13.8 SEC (ref 11.4–15.4)
RBC # BLD AUTO: 3.89 10^6/UL (ref 3.72–5.28)
SEGMENTED NEUTROPHILS % (MAN): 87 % (ref 42–78)
SP GR UR STRIP: 1.01
TOTAL CELLS COUNTED BLD: 100
UROBILINOGEN UR-MCNC: NEGATIVE MG/DL (ref ?–2)
VARIANT LYMPHS NFR BLD MANUAL: 3 % (ref 13–45)
WBC # BLD AUTO: 21.9 10^3/UL (ref 4–10.5)

## 2020-07-08 PROCEDURE — 80053 COMPREHEN METABOLIC PANEL: CPT

## 2020-07-08 PROCEDURE — 85610 PROTHROMBIN TIME: CPT

## 2020-07-08 PROCEDURE — 82803 BLOOD GASES ANY COMBINATION: CPT

## 2020-07-08 PROCEDURE — 99221 1ST HOSP IP/OBS SF/LOW 40: CPT

## 2020-07-08 PROCEDURE — 94660 CPAP INITIATION&MGMT: CPT

## 2020-07-08 PROCEDURE — 86900 BLOOD TYPING SEROLOGIC ABO: CPT

## 2020-07-08 PROCEDURE — 36415 COLL VENOUS BLD VENIPUNCTURE: CPT

## 2020-07-08 PROCEDURE — 87186 SC STD MICRODIL/AGAR DIL: CPT

## 2020-07-08 PROCEDURE — 87040 BLOOD CULTURE FOR BACTERIA: CPT

## 2020-07-08 PROCEDURE — 51701 INSERT BLADDER CATHETER: CPT

## 2020-07-08 PROCEDURE — 94640 AIRWAY INHALATION TREATMENT: CPT

## 2020-07-08 PROCEDURE — 85025 COMPLETE CBC W/AUTO DIFF WBC: CPT

## 2020-07-08 PROCEDURE — 76770 US EXAM ABDO BACK WALL COMP: CPT

## 2020-07-08 PROCEDURE — 82962 GLUCOSE BLOOD TEST: CPT

## 2020-07-08 PROCEDURE — 74230 X-RAY XM SWLNG FUNCJ C+: CPT

## 2020-07-08 PROCEDURE — 87086 URINE CULTURE/COLONY COUNT: CPT

## 2020-07-08 PROCEDURE — 83605 ASSAY OF LACTIC ACID: CPT

## 2020-07-08 PROCEDURE — 83735 ASSAY OF MAGNESIUM: CPT

## 2020-07-08 PROCEDURE — P9016 RBC LEUKOCYTES REDUCED: HCPCS

## 2020-07-08 PROCEDURE — 36430 TRANSFUSION BLD/BLD COMPNT: CPT

## 2020-07-08 PROCEDURE — 96360 HYDRATION IV INFUSION INIT: CPT

## 2020-07-08 PROCEDURE — 81001 URINALYSIS AUTO W/SCOPE: CPT

## 2020-07-08 PROCEDURE — 93005 ELECTROCARDIOGRAM TRACING: CPT

## 2020-07-08 PROCEDURE — 86901 BLOOD TYPING SEROLOGIC RH(D): CPT

## 2020-07-08 PROCEDURE — 71045 X-RAY EXAM CHEST 1 VIEW: CPT

## 2020-07-08 PROCEDURE — 99291 CRITICAL CARE FIRST HOUR: CPT

## 2020-07-08 PROCEDURE — 86920 COMPATIBILITY TEST SPIN: CPT

## 2020-07-08 PROCEDURE — 36600 WITHDRAWAL OF ARTERIAL BLOOD: CPT

## 2020-07-08 PROCEDURE — 80048 BASIC METABOLIC PNL TOTAL CA: CPT

## 2020-07-08 PROCEDURE — 93010 ELECTROCARDIOGRAM REPORT: CPT

## 2020-07-08 PROCEDURE — 87088 URINE BACTERIA CULTURE: CPT

## 2020-07-08 PROCEDURE — 84145 PROCALCITONIN (PCT): CPT

## 2020-07-08 PROCEDURE — 74176 CT ABD & PELVIS W/O CONTRAST: CPT

## 2020-07-08 PROCEDURE — 86850 RBC ANTIBODY SCREEN: CPT

## 2020-07-08 RX ADMIN — ENOXAPARIN SODIUM SCH MG: 40 INJECTION SUBCUTANEOUS at 20:26

## 2020-07-08 RX ADMIN — PIPERACILLIN AND TAZOBACTAM SCH MLS/HR: 3; .375 INJECTION, POWDER, LYOPHILIZED, FOR SOLUTION INTRAVENOUS; PARENTERAL at 23:26

## 2020-07-08 RX ADMIN — POTASSIUM CHLORIDE SCH MLS/HR: 29.8 INJECTION, SOLUTION INTRAVENOUS at 15:22

## 2020-07-08 RX ADMIN — PIPERACILLIN AND TAZOBACTAM SCH MLS/HR: 3; .375 INJECTION, POWDER, LYOPHILIZED, FOR SOLUTION INTRAVENOUS; PARENTERAL at 20:27

## 2020-07-08 RX ADMIN — POTASSIUM CHLORIDE SCH MLS/HR: 29.8 INJECTION, SOLUTION INTRAVENOUS at 17:34

## 2020-07-08 NOTE — ER DOCUMENT REPORT
ED General





- General


Stated Complaint: HEART PROBLEMS


Time Seen by Provider: 20 12:44


Primary Care Provider: 


MEG MCCARTHY MD [Primary Care Provider] - Follow up as needed


Notes: 





Ms. Beach is a 55-year-old lady with debilitating MS she was essentially 

bedbound with decubitus ulcers she was recently discharged by Dr. Mccarthy for 

UTI and encephalopathy.  She presents today for "heart problems" which is what 

the paramedics said however her actual issue is that she had a low-grade 

temperature at home yesterday and home health today noticed that she had a 

slightly elevated temperature as well.  She denies acute pain right now.  She 

denies shortness of breath.  She is noted to be slightly encephalopathic, and 

tachycardic in the room.


TRAVEL OUTSIDE OF THE [place] IN LAST 30 DAYS: No





- Related Data


Allergies/Adverse Reactions: 


                                        





latex [Latex] Allergy (Intermediate, Verified 20 16:45)


   RASH/BLISTER


Sulfa (Sulfonamide Antibiotics) Allergy (Intermediate, Verified 20 16:45)


   RASH/HIVES


bee venom protein (honey bee) Allergy (Verified 20 16:45)


   Anaphylaxis











Past Medical History





- General


Information source: Patient


Cannot obtain history due to: Dementia





- Social History


Smoking Status: Unknown if Ever Smoked


Family History: Reviewed & Not Pertinent





- Past Medical History


Cardiac Medical History: Reports: Hx Pulmonary Embolism - 2 years ago


   Denies: Hx Atrial Fibrillation, Hx Congestive Heart Failure, Hx Heart Attack,

 Hx Hypercholesterolemia, Hx Hypertension


Pulmonary Medical History: 


   Denies: Hx Asthma, Hx Bronchitis, Hx COPD, Hx Pneumonia, Hx Respiratory 

Failure, Hx Sleep Apnea, Hx Tuberculosis


Neurological Medical History: Denies: Hx Cerebrovascular Accident, Hx Migraine, 

Hx Seizures, Hx Parkinson's Disease


Endocrine Medical History: Reports: Hx Diabetes Mellitus Type 2, Hx 

Hypothyroidism.  Denies: Hx Diabetes Mellitus Type 1


Renal/ Medical History: Denies: Hx End Stage Renal Disease, Hx Kidney Stones, 

Hx Peritoneal Dialysis


Malignancy Medical History: Denies: Hx Leukemia, Hx Lung Cancer


GI Medical History: Denies: Hx Gastroesophageal Reflux Disease, Hx Hiatal 

Hernia, Hx Ulcer


Musculoskeletal Medical History: Denies Hx Arthritis, Reports Hx Multiple 

Sclerosis


Psychiatric Medical History: 


   Denies: Hx Attention Deficit Hyperactivity Disorder, Hx Bipolar Disorder, Hx 

Dementia, Hx Depression, Hx Schizophrenia


Infectious Medical History: Denies: Hx HIV


Past Surgical History: Reports: Hx  Section - x2, Hx Cholecystectomy.  

Denies: Hx Appendectomy, Hx Bowel Surgery, Hx Coronary Artery Bypass Graft, Hx 

Gastric Bypass Surgery, Hx Herniorrhaphy, Hx Hysterectomy, Hx Mastectomy, Hx 

Pacemaker, Hx Tonsillectomy, Hx Tubal Ligation





- Immunizations


Immunizations up to date: Yes


Hx Diphtheria, Pertussis, Tetanus Vaccination: Yes





Review of Systems





- Review of Systems


Notes: 





REVIEW OF SYSTEMS





By dementia








PHYSICAL EXAMINATION





General: Chronically ill and wasted 


Head: Atraumatic, normocephalic


ENT: Mouth normal, oropharynx moist, no exudates or tonsillar enlargement


Eyes: Conjunctiva normal, pupils equal, lids normal


Neck: No JVD, supple, no guarding


CVS: Tachycardic regular rhythm, no murmurs


Resp: No resp distress, equal and normal breath sounds bilaterally


GI: Nondistended, soft, no tenderness to palpation, no rebound or guarding


Ext: No deformities, no edema, normal range of motion in upper and lower ext


Back: No CVA or midline TTP


Skin: No rash, warm


Lymphatic: No lymphadeopathy noted


Neuro: Flaccid paralysis of both upper extremities and generalized weakness in 

the legs with atrophy





Physical Exam





- Vital signs


Vitals: 


                                        











Pulse Ox


 


 96 


 


 20 13:21














Course





- Re-evaluation


Re-evalutation: 





20 13:06


Patient with end-stage MS bedbound decubitus ulcers presents with tachycardia 

and essentially an unknown story of may be fever


She is afebrile here by rectal.  Her decubitus looks good


We will hydrate her, check for acute kidney injury and sepsis although it will 

have a source now we will check a cath UA





20 14:04


Patient's labs consistent with pyelonephritis.  Her cultures from last admission

 are pending but has been sensitive to cefepime in the past


Ordered cefepime.  Ordered fluids.  Order potassium





20 14:46


Discussed with Aura for admission.





- Vital Signs


Vital signs: 


                                        











Temp Pulse Resp BP Pulse Ox


 


             96 


 


             20 13:21














- Laboratory


Result Diagrams: 


                                 20 12:57





                                 20 12:57


Laboratory results interpreted by me: 


                                        











  20





  12:57 12:57 13:08


 


WBC  21.9 H  


 


Hgb  9.9 L  


 


Hct  30.2 L  


 


MCV  78 L  


 


MCH  25.4 L  


 


RDW  15.5 H  


 


Seg Neuts % (Manual)  87 H  


 


Lymphocytes % (Manual)  3 L  


 


Abs Neuts (Manual)  19.7 H  


 


Potassium   2.8 L* 


 


Chloride   110 H 


 


Glucose   131 H 


 


AST   53 H 


 


Alkaline Phosphatase   176 H 


 


Total Protein   6.1 L 


 


Albumin   2.4 L 


 


Urine Protein    30 H


 


Urine Blood    MODERATE H


 


Leukocyte Esterase Rfl    MODERATE H














- Diagnostic Test


Radiology reviewed: Image reviewed, Reports reviewed





- EKG Interpretation by Me


EKG shows normal: Sinus rhythm


Rate: Normal


Rhythm: NSR


When compared to previous EKG there are: Previous EKG unavailable





Critical Care Note





- Critical Care Note


Total time excluding time spent on procedures (mins): 34


Comments: 





The above patient is critically ill.  Not including procedures, but including 

direct re-evaluations, speaking with patient and/or consultants, interpreting 

results, and documenting, I spent the total amount of minute listed listed above

 on critical care time





Discharge





- Discharge


Clinical Impression: 


 Pyelonephritis, Hypokalemia





Sepsis


Qualifiers:


 Sepsis type: sepsis due to unspecified organism Sepsis acute organ dysfunction 

status: unspecified Qualified Code(s): A41.9 - Sepsis, unspecified organism





Condition: Fair


Disposition: ADMITTED AS INPATIENT


Admitting Provider: Auar


Unit Admitted: Medical Floor


Referrals: 


MEG MCCARTHY MD [Primary Care Provider] - Follow up as needed

## 2020-07-08 NOTE — RADIOLOGY REPORT (SQ)
EXAM DESCRIPTION:  CT ABD/PELVIS NO ORAL OR IV



IMAGES COMPLETED DATE/TIME:  7/8/2020 2:09 pm



REASON FOR STUDY:  r/o kidney stone



COMPARISON:  AP chest 7/8/2020

CT abdomen pelvis 11/5/2011



TECHNIQUE:  CT scan of the abdomen and pelvis performed without intravenous or oral contrast. Images 
reviewed with lung, soft tissue, and bone windows. Reconstructed coronal and sagittal MPR images revi
ewed. All images stored on PACS.

All CT scanners at this facility use dose modulation, iterative reconstruction, and/or weight based d
osing when appropriate to reduce radiation dose to as low as reasonably achievable (ALARA).

[facility]: Dose Right  [facility]: CareDose    [facility]: Dose Right    [facility]: Teradose 4D    [facility]: Smart Technologies



RADIATION DOSE:  CT Rad equipment meets quality standard of care and radiation dose reduction techniq
ues were employed. CTDIvol: 22.5 mGy. DLP: 1360 mGy-cm.mGy.



LIMITATIONS:  None.



FINDINGS:  A 1.6 x 0.8 cm stone is present in the proximal left ureter, causing moderate left hydrone
phrosis.  Stone is best shown on coronal image 49 and axial image 45.

Elsewhere within the left kidney, a staghorn calculus involving the upper pole, midpole, lower pole, 
and renal pelvis present measuring 860 Hounsfield units in density.  Left lower pole 3 cm complex cys
t versus cystic mass, coronal image 44.  Renal ultrasound would be useful for further characterizatio
n

No distal left ureteral stone.

LOWER CHEST: No significant findings. No nodules or infiltrates.

NON-CONTRASTED LIVER, SPLEEN, ADRENALS: Evaluation limited by lack of IV contrast. No identified sign
ificant masses.  Borderline splenomegaly 14 cm in length

PANCREAS: No masses. No peripancreatic inflammatory changes.

GALLBLADDER: Surgically absent

RIGHT KIDNEY AND URETER: No suspicious masses. Assessment limited by lack of IV contrast.   Staghorn 
calculus in the right upper pole, mid and lower pole, and renal pelvis.  Stone measures 860 Hounsfiel
d units.  No right ureteral calculi.   No hydronephrosis or hydroureter.

LEFT KIDNEY AND URETER: As above.

AORTA AND RETROPERITONEUM: No aneurysm. No retroperitoneal masses or adenopathy.

BOWEL AND PERITONEAL CAVITY: No obvious masses or inflammatory changes. No free fluid.

APPENDIX: Normal.

PELVIS, BLADDER, AND ABDOMINAL WALL:No abnormal masses. No free fluid. Bladder normal.  Normal size f
emale pelvic organs

BONES: No significant findings.

OTHER: Left ischial decubitus ulcer axial image 98



IMPRESSION:  1.6 x 0.8 cm left proximal ureteral stone with moderate left hydronephrosis

Bilateral intrarenal staghorn calculi

Complex cyst versus cystic mass left lower pole kidney.  Ultrasound may be useful for further charact
erization



COMMENT:  Quality ID # [id number]: Final reports with documentation of one or more dose reduction techniques
 (e.g., Automated exposure control, adjustment of the mA and/or kV according to patient size, use of 
iterative reconstruction technique)



TECHNICAL DOCUMENTATION:  JOB ID:  [id number]

 2011 [employer]- All Rights Reserved



Reading location - IP/workstation name: [id number]

## 2020-07-08 NOTE — EKG REPORT
SEVERITY:- BORDERLINE ECG -

SINUS TACHYCARDIA

BORDERLINE T WAVE ABNORMALITIES

:

Confirmed by: Wilson Polanco MD 08-Jul-2020 16:23:03

## 2020-07-08 NOTE — ER DOCUMENT REPORT
ED General





- General


Stated Complaint: HEART PROBLEMS


Time Seen by Provider: 20 12:44


Notes: 





See other note dated today.


TRAVEL OUTSIDE OF THE [place] IN LAST 30 DAYS: No





- Related Data


Allergies/Adverse Reactions: 


                                        





latex [Latex] Allergy (Intermediate, Verified 20 16:45)


   RASH/BLISTER


Sulfa (Sulfonamide Antibiotics) Allergy (Intermediate, Verified 20 16:45)


   RASH/HIVES


bee venom protein (honey bee) Allergy (Verified 20 16:45)


   Anaphylaxis











Past Medical History





- Social History


Smoking Status: Never Smoker


Family History: Reviewed & Not Pertinent





- Past Medical History


Cardiac Medical History: Reports: Hx Pulmonary Embolism - 2 years ago


   Denies: Hx Atrial Fibrillation, Hx Congestive Heart Failure, Hx Heart Attack,

 Hx Hypercholesterolemia, Hx Hypertension


Pulmonary Medical History: 


   Denies: Hx Asthma, Hx Bronchitis, Hx COPD, Hx Pneumonia, Hx Respiratory 

Failure, Hx Sleep Apnea, Hx Tuberculosis


Neurological Medical History: Denies: Hx Cerebrovascular Accident, Hx Migraine, 

Hx Seizures, Hx Parkinson's Disease


Endocrine Medical History: Reports: Hx Diabetes Mellitus Type 2, Hx 

Hypothyroidism.  Denies: Hx Diabetes Mellitus Type 1


Renal/ Medical History: Denies: Hx End Stage Renal Disease, Hx Kidney Stones, 

Hx Peritoneal Dialysis


Malignancy Medical History: Denies: Hx Leukemia, Hx Lung Cancer


GI Medical History: Denies: Hx Gastroesophageal Reflux Disease, Hx Hiatal 

Hernia, Hx Ulcer


Musculoskeletal Medical History: Denies Hx Arthritis, Reports Hx Multiple 

Sclerosis


Psychiatric Medical History: 


   Denies: Hx Attention Deficit Hyperactivity Disorder, Hx Bipolar Disorder, Hx 

Dementia, Hx Depression, Hx Schizophrenia


Infectious Medical History: Denies: Hx HIV


Past Surgical History: Reports: Hx  Section - x2, Hx Cholecystectomy.  

Denies: Hx Appendectomy, Hx Bowel Surgery, Hx Coronary Artery Bypass Graft, Hx 

Gastric Bypass Surgery, Hx Herniorrhaphy, Hx Hysterectomy, Hx Mastectomy, Hx 

Pacemaker, Hx Tonsillectomy, Hx Tubal Ligation





- Immunizations


Immunizations up to date: Yes


Hx Diphtheria, Pertussis, Tetanus Vaccination: Yes





Physical Exam





- Vital signs


Vitals: 


                                        











Temp Resp Pulse Ox


 


 98.8 F   27 H  96 


 


 20 12:45  20 12:45  20 12:45














Course





- Vital Signs


Vital signs: 


                                        











Temp Pulse Resp BP Pulse Ox


 


 98.8 F      26 H  129/83 H  96 


 


 20 12:45     20 14:05  20 13:01  20 13:21














- Laboratory


Result Diagrams: 


                                 20 12:57





                                 20 12:57


Laboratory results interpreted by me: 


                                        











  20





  12:57 12:57 13:08


 


WBC  21.9 H  


 


Hgb  9.9 L  


 


Hct  30.2 L  


 


MCV  78 L  


 


MCH  25.4 L  


 


RDW  15.5 H  


 


Seg Neuts % (Manual)  87 H  


 


Lymphocytes % (Manual)  3 L  


 


Abs Neuts (Manual)  19.7 H  


 


Potassium   2.8 L* 


 


Chloride   110 H 


 


Glucose   131 H 


 


AST   53 H 


 


Alkaline Phosphatase   176 H 


 


Total Protein   6.1 L 


 


Albumin   2.4 L 


 


Urine Protein    30 H


 


Urine Blood    MODERATE H


 


Leukocyte Esterase Rfl    MODERATE H














- Diagnostic Test


Radiology reviewed: Image reviewed, Reports reviewed





- EKG Interpretation by Me


EKG shows normal: Sinus rhythm


Rate: Tachycardia


Rhythm: NSR


Voltage: Decreased voltage, Throughout


When compared to previous EKG there are: No significant change





Critical Care Note





- Critical Care Note


Total time excluding time spent on procedures (mins): 32


Comments: 





The above patient is critically ill.  Not including procedures, but including 

direct re-evaluations, speaking with patient and/or consultants, interpreting 

results, and documenting, I spent the total amount of minute listed listed above

 on critical care time





Discharge





- Discharge


Clinical Impression: 


 Pyelonephritis, Hypokalemia





Sepsis


Qualifiers:


 Sepsis type: sepsis due to unspecified organism Sepsis acute organ dysfunction 

status: unspecified Qualified Code(s): A41.9 - Sepsis, unspecified organism





Condition: Fair


Disposition: ADMITTED AS INPATIENT

## 2020-07-08 NOTE — RADIOLOGY REPORT (SQ)
EXAM DESCRIPTION:  CHEST SINGLE VIEW



IMAGES COMPLETED DATE/TIME:  7/8/2020 1:07 pm



REASON FOR STUDY:  tachycardia



COMPARISON:  5/19/2020



EXAM PARAMETERS:  NUMBER OF VIEWS: One view.

TECHNIQUE: Single frontal radiographic view of the chest acquired.

RADIATION DOSE: NA

LIMITATIONS: None.



FINDINGS:  LUNGS AND PLEURA: No opacities, masses or pneumothorax. No pleural effusion.

MEDIASTINUM AND HILAR STRUCTURES: No masses.  Contour normal.

HEART AND VASCULAR STRUCTURES: Heart normal in size.  Normal vasculature.

BONES: No acute findings.

HARDWARE: None in the chest.

OTHER: No other significant finding.



IMPRESSION:  NO ACUTE RADIOGRAPHIC FINDING IN THE CHEST.



TECHNICAL DOCUMENTATION:  JOB ID:  [id number]

 2011 [employer]- All Rights Reserved



Reading location - IP/workstation name: [email or web address]

## 2020-07-09 LAB
ADD MANUAL DIFF: NO
ALBUMIN SERPL-MCNC: 2.3 G/DL (ref 3.5–5)
ALP SERPL-CCNC: 148 U/L (ref 38–126)
ANION GAP SERPL CALC-SCNC: 7 MMOL/L (ref 5–19)
AST SERPL-CCNC: 48 U/L (ref 14–36)
BASOPHILS # BLD AUTO: 0 10^3/UL (ref 0–0.2)
BASOPHILS NFR BLD AUTO: 0.3 % (ref 0–2)
BILIRUB DIRECT SERPL-MCNC: 0.3 MG/DL (ref 0–0.4)
BILIRUB SERPL-MCNC: 0.9 MG/DL (ref 0.2–1.3)
BUN SERPL-MCNC: 11 MG/DL (ref 7–20)
CALCIUM: 8.4 MG/DL (ref 8.4–10.2)
CHLORIDE SERPL-SCNC: 112 MMOL/L (ref 98–107)
CO2 SERPL-SCNC: 23 MMOL/L (ref 22–30)
EOSINOPHIL # BLD AUTO: 0 10^3/UL (ref 0–0.6)
EOSINOPHIL NFR BLD AUTO: 0.1 % (ref 0–6)
ERYTHROCYTE [DISTWIDTH] IN BLOOD BY AUTOMATED COUNT: 15.6 % (ref 11.5–14)
GLUCOSE SERPL-MCNC: 101 MG/DL (ref 75–110)
HCT VFR BLD CALC: 28.8 % (ref 36–47)
HGB BLD-MCNC: 9.6 G/DL (ref 12–15.5)
LYMPHOCYTES # BLD AUTO: 0.9 10^3/UL (ref 0.5–4.7)
LYMPHOCYTES NFR BLD AUTO: 5.7 % (ref 13–45)
MCH RBC QN AUTO: 25.8 PG (ref 27–33.4)
MCHC RBC AUTO-ENTMCNC: 33.1 G/DL (ref 32–36)
MCV RBC AUTO: 78 FL (ref 80–97)
MONOCYTES # BLD AUTO: 0.7 10^3/UL (ref 0.1–1.4)
MONOCYTES NFR BLD AUTO: 4.8 % (ref 3–13)
NEUTROPHILS # BLD AUTO: 13.8 10^3/UL (ref 1.7–8.2)
NEUTS SEG NFR BLD AUTO: 89.1 % (ref 42–78)
PLATELET # BLD: 247 10^3/UL (ref 150–450)
POTASSIUM SERPL-SCNC: 3.4 MMOL/L (ref 3.6–5)
PROT SERPL-MCNC: 5.8 G/DL (ref 6.3–8.2)
RBC # BLD AUTO: 3.7 10^6/UL (ref 3.72–5.28)
TOTAL CELLS COUNTED % (AUTO): 100 %
WBC # BLD AUTO: 15.5 10^3/UL (ref 4–10.5)

## 2020-07-09 RX ADMIN — SODIUM CHLORIDE PRN MLS/HR: 9 INJECTION, SOLUTION INTRAVENOUS at 17:57

## 2020-07-09 RX ADMIN — POTASSIUM CHLORIDE SCH MLS/HR: 29.8 INJECTION, SOLUTION INTRAVENOUS at 23:54

## 2020-07-09 RX ADMIN — PIPERACILLIN AND TAZOBACTAM SCH MLS/HR: 3; .375 INJECTION, POWDER, LYOPHILIZED, FOR SOLUTION INTRAVENOUS; PARENTERAL at 11:36

## 2020-07-09 RX ADMIN — POTASSIUM CHLORIDE SCH: 750 TABLET, FILM COATED, EXTENDED RELEASE ORAL at 20:16

## 2020-07-09 RX ADMIN — SODIUM CHLORIDE PRN MLS/HR: 9 INJECTION, SOLUTION INTRAVENOUS at 06:58

## 2020-07-09 RX ADMIN — POTASSIUM CHLORIDE SCH MEQ: 750 TABLET, FILM COATED, EXTENDED RELEASE ORAL at 17:55

## 2020-07-09 RX ADMIN — MAGNESIUM SULFATE IN DEXTROSE SCH MLS/HR: 10 INJECTION, SOLUTION INTRAVENOUS at 19:29

## 2020-07-09 RX ADMIN — MAGNESIUM SULFATE IN DEXTROSE SCH MLS/HR: 10 INJECTION, SOLUTION INTRAVENOUS at 22:31

## 2020-07-09 RX ADMIN — PIPERACILLIN AND TAZOBACTAM SCH MLS/HR: 3; .375 INJECTION, POWDER, LYOPHILIZED, FOR SOLUTION INTRAVENOUS; PARENTERAL at 06:57

## 2020-07-09 RX ADMIN — PIPERACILLIN AND TAZOBACTAM SCH MLS/HR: 3; .375 INJECTION, POWDER, LYOPHILIZED, FOR SOLUTION INTRAVENOUS; PARENTERAL at 17:54

## 2020-07-09 RX ADMIN — OXYCODONE HYDROCHLORIDE AND ACETAMINOPHEN SCH MG: 500 TABLET ORAL at 11:36

## 2020-07-09 RX ADMIN — Medication SCH MG: at 11:37

## 2020-07-09 RX ADMIN — ENOXAPARIN SODIUM SCH MG: 40 INJECTION SUBCUTANEOUS at 11:37

## 2020-07-09 RX ADMIN — POTASSIUM CHLORIDE SCH MLS/HR: 29.8 INJECTION, SOLUTION INTRAVENOUS at 20:36

## 2020-07-09 RX ADMIN — PANTOPRAZOLE SODIUM SCH MG: 40 TABLET, DELAYED RELEASE ORAL at 06:57

## 2020-07-09 RX ADMIN — Medication SCH UNIT: at 11:37

## 2020-07-09 RX ADMIN — POTASSIUM CHLORIDE SCH MEQ: 750 TABLET, FILM COATED, EXTENDED RELEASE ORAL at 11:41

## 2020-07-09 NOTE — RADIOLOGY REPORT (SQ)
EXAM DESCRIPTION:  U/S RETROPERITON (RENAL/AORTA)



IMAGES COMPLETED DATE/TIME:  7/9/2020 2:48 pm



REASON FOR STUDY:  Left kidney lower pole complex vs cystic mass



COMPARISON:  None.



TECHNIQUE:  Dynamic and static grayscale images acquired of the kidneys and bladder and recorded on P
ACS. Additional selected color Doppler and spectral images recorded.



LIMITATIONS:  None.



FINDINGS:  RIGHT KIDNEY: Normal size.  No solid or suspicious masses.  No hydronephrosis.  Re- demons
tration of staghorn calculi.

LEFT KIDNEY:  Normal size.  No solid or suspicious masses.  Abnormal findings on comparison CT imagin
g involving the inferior pole the left kidney correlates to a hypoechoic structure which is poorly ch
aracterized due to acoustic impedance.

BLADDER: No masses.

OTHER FINDINGS: No other significant finding.



IMPRESSION:  Queried left kidney inferior pole lesion is poorly characterized due to acoustic impedan
ce.  Recommend dedicated renal CT or renal MR imaging for definitive characterization.



TECHNICAL DOCUMENTATION:  JOB ID:  [id number]

 2011 [employer]- All Rights Reserved



Reading location - IP/workstation name: [email or web address]

## 2020-07-09 NOTE — PDOC PROGRESS REPORT
Subjective


Progress Note for:: 07/09/20


Subjective:: 





Patient's continence is much better today and she was able to contribute to her 

medical history. She denied any fever or chills. No chest pain or difficulty 

with breathing. Patient's food intake remain very poor.


Reason For Visit: 


RECURRENT UTI WIHT UROSEPSIS,SACRAL DECUBITUS ULCE








Physical Exam


Vital Signs: 


                                        











Temp Pulse Resp BP Pulse Ox


 


 97.9 F   115 H  16   120/72   88 L


 


 07/09/20 15:12  07/09/20 15:12  07/09/20 15:12  07/09/20 15:12  07/09/20 15:12








                                 Intake & Output











 07/08/20 07/09/20 07/10/20





 06:59 06:59 06:59


 


Intake Total  1450 1200


 


Output Total  150 250


 


Balance  1300 950


 


Weight  76.5 kg 76.5 kg











General appearance: PRESENT: no acute distress


Head exam: PRESENT: atraumatic, normocephalic


Eye exam: PRESENT: conjunctiva pink.  ABSENT: scleral icterus


Respiratory exam: PRESENT: decreased breath sounds - at lung bases


Cardiovascular exam: PRESENT: RRR, +S1, +S2.  ABSENT: diastolic murmur, rubs, 

systolic murmur


Vascular exam: ABSENT: pallor


GI/Abdominal exam: PRESENT: normal bowel sounds, soft.  ABSENT: distended, 

guarding, mass, organolmegaly, rebound, tenderness


Extremities exam: ABSENT: pedal edema


Neurological exam: PRESENT: alert, awake, oriented to person, oriented to place,

oriented to time, oriented to situation


Psychiatric exam: ABSENT: agitated, anxious


Skin exam: PRESENT: dry, warm.  ABSENT: intact - bilateral sacral region stage 3

decubitii





Results


Laboratory Results: 


                                        





                                 07/09/20 04:55 





                                 07/09/20 04:55 





                                        











  07/08/20 07/08/20 07/09/20





  18:52 21:50 04:55


 


WBC    15.5 H


 


RBC    3.70 L


 


Hgb    9.6 L


 


Hct    28.8 L


 


MCV    78 L


 


MCH    25.8 L


 


MCHC    33.1


 


RDW    15.6 H


 


Plt Count    247


 


Seg Neutrophils %    89.1 H


 


Sodium   


 


Potassium   


 


Chloride   


 


Carbon Dioxide   


 


Anion Gap   


 


BUN   


 


Creatinine   


 


Est GFR (African Amer)   


 


Glucose   


 


Lactic Acid  1.0  2.7 H 


 


Calcium   


 


Magnesium   


 


Total Bilirubin   


 


AST   


 


Alkaline Phosphatase   


 


Total Protein   


 


Albumin   














  07/09/20 07/09/20





  04:55 04:55


 


WBC  


 


RBC  


 


Hgb  


 


Hct  


 


MCV  


 


MCH  


 


MCHC  


 


RDW  


 


Plt Count  


 


Seg Neutrophils %  


 


Sodium  142.1 


 


Potassium  3.4 L 


 


Chloride  112 H 


 


Carbon Dioxide  23 


 


Anion Gap  7 


 


BUN  11 


 


Creatinine  0.47 L 


 


Est GFR (African Amer)  > 60 


 


Glucose  101 


 


Lactic Acid  


 


Calcium  8.4 


 


Magnesium   1.5 L


 


Total Bilirubin  0.9 


 


AST  48 H 


 


Alkaline Phosphatase  148 H 


 


Total Protein  5.8 L 


 


Albumin  2.3 L 











Impressions: 


                                        





Chest X-Ray  07/08/20 12:39


IMPRESSION:  NO ACUTE RADIOGRAPHIC FINDING IN THE CHEST.


 








Abdomen/Pelvis CT  07/08/20 13:35


IMPRESSION:  1.6 x 0.8 cm left proximal ureteral stone with moderate left 

hydronephrosis


Bilateral intrarenal staghorn calculi


Complex cyst versus cystic mass left lower pole kidney.  Ultrasound may be use

ful for further characterization


 








Renal Ultrasound  07/09/20 00:00


IMPRESSION:  Queried left kidney inferior pole lesion is poorly characterized 

due to acoustic impedance.  Recommend dedicated renal CT or renal MR imaging for

definitive characterization.


 














Assessment & Plan





- Diagnosis


(1) Recurrent UTI (urinary tract infection)


Is this a current diagnosis for this admission?: Yes   





(2) Sepsis with metabolic encephalopathy


Is this a current diagnosis for this admission?: Yes   





(3) Hypokalemia


Is this a current diagnosis for this admission?: Yes   





(4) Staghorn kidney stones


Is this a current diagnosis for this admission?: Yes   





(5) Hydronephrosis of left kidney


Is this a current diagnosis for this admission?: Yes   





(6) Pressure ulcer of unspecified buttock, stage 3


Qualifiers: 


   Laterality: unspecified laterality   Qualified Code(s): L89.303 - Pressure 

ulcer of unspecified buttock, stage 3   


Is this a current diagnosis for this admission?: Yes   





(7) Multiple sclerosis, primary chronic progressive


Is this a current diagnosis for this admission?: Yes   





- Time


Time Spent with patient: 25-34 minutes


Level of Care: MEDICAL


Medications reviewed and adjusted accordingly: Yes


Anticipated discharge: Home with Homehealth


Within: Other





- Inpatient Certification


Based on my medical assessment, after consideration of the patient's 

comorbidities, presenting symptoms, or acuity I expect that the services needed 

warrant INPATIENT care.: Yes


I certify that my determination is in accordance with my understanding of 

Medicare's requirements for reasonable and necessary INPATIENT services [42 CFR 

412.3e].: Yes


Medical Necessity: Significant Comorbidiites Make Outpatient Treatment Too 

Risky, Need Close Monitoring Due to Risk of Patient Decompensation, Need For IV 

Fluids, Need for IV Antibiotics, Risk of Complication if Not Cared For in 

Hospital, Risk of Diagnosis Which Will Require Inpatient Eval/Care/Monitoring


Post Hospital Care: D/C Planner Documentation





- Plan Summary


Plan Summary: 





Continue IV Zosyn coverage. D/C oral Potassium due to expressed difficult with 

swallowing. Patient will receive IV rider for potassium 40 mEq and Magnesium 2 

gm. Obtain CBC with diff, mag, and CMP in am. Overall prognosis remain very poor

in view of her advance multiple sclerosis and kidney findings. Follow up on 

urine culture findings.

## 2020-07-09 NOTE — PDOC H&P
History of Present Illness


Admission Date/PCP: 


  20 14:39





  MEG SHARI





History of Present Illness: 


VI PAIGE is a 55 year old female patient known to my practice who 

presented to the ED via EMS for reported low grade fever and questionable heart 

problem. Her initial ED evaluation was significant for demonstrable confusion 

and tachycardia. Her laboratory evaluation revealed significant leukocytosis and

abnormal urinalysis with CT abdomen and Pelvis confirming bilateral staghorn 

calculi, left proximal urethral stone with moderate left hydronephrosis, and 

complex versus cystic mass in the lower pole of the left kidney. She was advised

hospitalization for further evaluation and management. Her morbidities are 

listed below.





Past Medical History


Cardiac Medical History: Reports: Pulmonary Embolism - 2 years ago


   Denies: Atrial Fibrillation, Congestive Heart Failure, Myocardial Infarction,

Hyperlipidema, Hypertension


Pulmonary Medical History: 


   Denies: Asthma, Bronchitis, Chronic Obstructive Pulmonary Disease (COPD), 

Pneumonia, Respiratory Failure, Sleep Apnea, Tuberculosis


Neurological Medical History: 


   Denies: Migraine, Seizures


Endocrine Medical History: Reports: Diabetes Mellitus Type 2, Hypothyroidism


   Denies: Diabetes Mellitus Type 1


Renal/ Medical History: 


   Denies: End Stage Renal Disease


Malignancy Medical History: 


   Denies: Leukemia, Lung Cancer


GI Medical History: 


   Denies: Gastroesophageal Reflux Disease, Hiatal Hernia


Musculoskeltal Medical History: 


   Denies: Arthritis


Psychiatric Medical History: 


   Denies: Attention Deficit Hyperactivity Disorder, Bipolar Disorder, Dementia,

Depression


Hematology: Reports: Anemia


   Denies: Hemophilia, Sickle Cell Disease


Infectious Medical History: 


   Denies: HIV





Past Surgical History


Past Surgical History: Reports:  Section - x2, Cholecystectomy


   Denies: Appendectomy, Coronary Artery Bypass Graft, Gastric Bypass Surgery, 

Herniorrhaphy, Hysterectomy, Mastectomy, Pacemaker, Tonsillectomy, Tubal 

Ligation





Social History


Smoking Status: Never Smoker


Frequency of Alcohol Use: None


Hx Recreational Drug Use: No


Drugs: None


Hx Prescription Drug Abuse: No





- Advance Directive


Resuscitation Status: Full Code





Family History


Family History: Reviewed & Not Pertinent


Parental Family History Reviewed: Yes


Children Family History Reviewed: Yes


Sibling(s) Family History Reviewed.: Yes





Medication/Allergy


Home Medications: 








Acetaminophen [Tylenol 325 mg Tablet] 650 mg PO Q6HP PRN 20 


Baclofen [Baclofen 10 mg Tablet] 10 mg PO Q8HP PRN 20 


Ascorbic Acid [Vitamin C] 1,000 mg PO DAILY #30 tablet 20 


Vitamin A [Vitamin A 10,000 Unit Capsule] 10,000 unit PO DAILY #30 capsule 

20 


Whey Protein Isolate [Beneprotein] 227 gm PO TID #90 powder 20 


Zinc Sulfate [Zinc-220 Capsule] 220 mg PO DAILY #30 capsule 20 








Allergies/Adverse Reactions: 


                                        





latex [Latex] Allergy (Intermediate, Verified 20 16:45)


   RASH/BLISTER


Sulfa (Sulfonamide Antibiotics) Allergy (Intermediate, Verified 20 16:45)


   RASH/HIVES


bee venom protein (honey bee) Allergy (Verified 20 16:45)


   Anaphylaxis











Review of Systems


Constitutional: PRESENT: chills, fatigue, fever(s), weakness


Eyes: ABSENT: visual disturbances


Ears: ABSENT: hearing changes


Nose, Mouth, and Throat: ABSENT: headache(s), mouth pain, sore throat, vertigo


Cardiovascular: ABSENT: chest pain, orthropnea


Respiratory: ABSENT: cough, dyspnea, hemoptysis, sputum


Gastrointestinal: ABSENT: abdominal pain, constipation, diarrhea, hematemesis, 

hematochezia, nausea, vomiting


Genitourinary: PRESENT: difficulty urinating, other - malodor, cloudy


Musculoskeletal: PRESENT: muscle weakness - generaly relasted to her multiple 

sclerosis


Integumentary: PRESENT: diaphoresis, wounds - buttocks


Neurological: PRESENT: abnormal speech - related to her multiple sclerosi.  

ABSENT: abnormal gait, confusion, dizziness, focal weakness, syncope


Psychiatric: ABSENT: anxiety, depression, homidical ideation, suicidal ideation


Endocrine: ABSENT: cold intolerance, heat intolerance, polydipsia, polyuria


Hematologic/Lymphatic: ABSENT: easy bleeding, easy bruising, lymphadenopathy


Allergic/Immunologic: ABSENT: seasonal rhinorrhea





Physical Exam


Vital Signs: 


                                        











Temp Pulse Resp BP Pulse Ox


 


 98.8 F      17   145/75 H  94 


 


 20 12:45     20 16:01  20 16:01  20 16:01








                                 Intake & Output











 20





 06:59 06:59 06:59


 


Intake Total   1050


 


Balance   1050


 


Weight   78.9 kg











General appearance: PRESENT: no acute distress


Head exam: PRESENT: atraumatic, normocephalic


Eye exam: PRESENT: conjunctiva pink, EOMI, PERRLA.  ABSENT: scleral icterus


Ear exam: PRESENT: normal external ear exam


Mouth exam: PRESENT: dry mucosa


Neck exam: ABSENT: thyromegaly


Respiratory exam: PRESENT: clear to auscultation elver, decreased breath sounds - 

at lung bases


Cardiovascular exam: PRESENT: RRR, +S1, +S2, tachycardia.  ABSENT: diastolic 

murmur, rubs, systolic murmur


Vascular exam: ABSENT: pallor


GI/Abdominal exam: PRESENT: normal bowel sounds, soft.  ABSENT: distended, 

guarding, mass, organolmegaly, rebound, tenderness


Rectal exam: PRESENT: deferred


Extremities exam: ABSENT: pedal edema


Musculoskeletal exam: PRESENT: deformity - related to her advanced multiple 

sclerosis spastic contracture deformity


Neurological exam: PRESENT: alert, awake, oriented to person, oriented to place,

 oriented to time, oriented to situation, abnormal gait - bedbound due to 

advanced multiple sclerosi


Psychiatric exam: PRESENT: appropriate affect, normal mood.  ABSENT: homicidal 

ideation, suicidal ideation


Skin exam: PRESENT: dry, warm, other - stage 3 bilateral sacral decubitii ulcers





Results


Laboratory Results: 


                                        





                                 20 12:57 





                                 20 12:57 





                                        











  20





  12:57 12:57 13:08


 


WBC  21.9 H  


 


RBC  3.89  


 


Hgb  9.9 L  


 


Hct  30.2 L  


 


MCV  78 L  


 


MCH  25.4 L  


 


MCHC  32.7  


 


RDW  15.5 H  


 


Plt Count  330  


 


Seg Neutrophils %  Not Reportable  


 


VBG pH   


 


VBG pCO2   


 


VBG HCO3   


 


VBG Base Excess   


 


Sodium   141.1 


 


Potassium   2.8 L* 


 


Chloride   110 H 


 


Carbon Dioxide   26 


 


Anion Gap   5 


 


BUN   12 


 


Creatinine   0.52 


 


Est GFR ( Amer)   > 60 


 


Glucose   131 H 


 


Lactic Acid   


 


Calcium   8.9 


 


Total Bilirubin   0.7 


 


AST   53 H 


 


Alkaline Phosphatase   176 H 


 


Total Protein   6.1 L 


 


Albumin   2.4 L 


 


Urine Color    YELLOW


 


Urine Appearance    SLIGHTLY-CLOUDY


 


Urine pH    5.0


 


Ur Specific Gravity    1.013


 


Urine Protein    30 H


 


Urine Glucose (UA)    NEGATIVE


 


Urine Ketones    NEGATIVE


 


Urine Blood    MODERATE H


 


Urine RBC (Auto)    30














  20





  13:30 13:30


 


WBC  


 


RBC  


 


Hgb  


 


Hct  


 


MCV  


 


MCH  


 


MCHC  


 


RDW  


 


Plt Count  


 


Seg Neutrophils %  


 


VBG pH  7.42 


 


VBG pCO2  40.6 


 


VBG HCO3  25.5 


 


VBG Base Excess  0.9 


 


Sodium  


 


Potassium  


 


Chloride  


 


Carbon Dioxide  


 


Anion Gap  


 


BUN  


 


Creatinine  


 


Est GFR (African Amer)  


 


Glucose  


 


Lactic Acid   1.1


 


Calcium  


 


Total Bilirubin  


 


AST  


 


Alkaline Phosphatase  


 


Total Protein  


 


Albumin  


 


Urine Color  


 


Urine Appearance  


 


Urine pH  


 


Ur Specific Gravity  


 


Urine Protein  


 


Urine Glucose (UA)  


 


Urine Ketones  


 


Urine Blood  


 


Urine RBC (Auto)  











Impressions: 


                                        





Chest X-Ray  20 12:39


IMPRESSION:  NO ACUTE RADIOGRAPHIC FINDING IN THE CHEST.


 








Abdomen/Pelvis CT  20 13:35


IMPRESSION:  1.6 x 0.8 cm left proximal ureteral stone with moderate left 

hydronephrosis


Bilateral intrarenal staghorn calculi


Complex cyst versus cystic mass left lower pole kidney.  Ultrasound may be 

useful for further characterization


 














Assessment & Plan





- Diagnosis


(1) Recurrent UTI (urinary tract infection)


Is this a current diagnosis for this admission?: Yes   


Plan: 


See admitting attending physician orders for details about care plan.








(2) Sepsis with metabolic encephalopathy


Is this a current diagnosis for this admission?: Yes   


Plan: 


See admitting attending physician orders for details about care plan.








(3) Hypokalemia


Is this a current diagnosis for this admission?: Yes   


Plan: 


See admitting attending physician orders for details about care plan.








(4) Staghorn kidney stones


Is this a current diagnosis for this admission?: Yes   


Plan: 


See admitting attending physician orders for details about care plan.








(5) Hydronephrosis of left kidney


Is this a current diagnosis for this admission?: Yes   


Plan: 


See admitting attending physician orders for details about care plan.








(6) Pressure ulcer of unspecified buttock, stage 3


Qualifiers: 


   Laterality: unspecified laterality   Qualified Code(s): L89.303 - Pressure 

ulcer of unspecified buttock, stage 3   


Is this a current diagnosis for this admission?: Yes   


Plan: 


See admitting attending physician orders for details about care plan.








(7) Multiple sclerosis, primary chronic progressive


Is this a current diagnosis for this admission?: Yes   


Plan: 


See admitting attending physician orders for details about care plan.








- Time


Time Spent: 50 to 70 Minutes


Medications reviewed and adjusted accordingly: Yes


Anticipated discharge: Home with Homehealth


Within: Other





- Inpatient Certification


Based on my medical assessment, after consideration of the patient's 

comorbidities, presenting symptoms, or acuity I expect that the services needed 

warrant INPATIENT care.: Yes


I certify that my determination is in accordance with my understanding of 

Medicare's requirements for reasonable and necessary INPATIENT services [42 CFR 

412.3e].: Yes


Medical Necessity: Significant Comorbidiites Make Outpatient Treatment Too 

Risky, Need Close Monitoring Due to Risk of Patient Decompensation, Need For IV 

Fluids, Need for IV Antibiotics, Risk of Complication if Not Cared For in 

Hospital, Risk of Diagnosis Which Will Require Inpatient Eval/Care/Monitoring


Post Hospital Care: D/C Planner Documentation





- Plan Summary


Plan Summary: 





See admitting attending physician orders for details about care plan.

## 2020-07-09 NOTE — EKG REPORT
SEVERITY:- ABNORMAL ECG -

SINUS TACHYCARDIA

LEFT AXIS DEVIATION

PROBABLE ANTEROSEPTAL INFARCT, OLD

LATERAL LEADS ARE ALSO INVOLVED

:

Confirmed by: Wilson Polanco MD 09-Jul-2020 07:50:24

## 2020-07-10 LAB
ADD MANUAL DIFF: NO
ALBUMIN SERPL-MCNC: 2.3 G/DL (ref 3.5–5)
ALP SERPL-CCNC: 139 U/L (ref 38–126)
ANION GAP SERPL CALC-SCNC: 6 MMOL/L (ref 5–19)
AST SERPL-CCNC: 30 U/L (ref 14–36)
BASOPHILS # BLD AUTO: 0.1 10^3/UL (ref 0–0.2)
BASOPHILS NFR BLD AUTO: 0.5 % (ref 0–2)
BILIRUB DIRECT SERPL-MCNC: 0.3 MG/DL (ref 0–0.4)
BILIRUB SERPL-MCNC: 0.8 MG/DL (ref 0.2–1.3)
BUN SERPL-MCNC: 11 MG/DL (ref 7–20)
CALCIUM: 8.4 MG/DL (ref 8.4–10.2)
CHLORIDE SERPL-SCNC: 113 MMOL/L (ref 98–107)
CO2 SERPL-SCNC: 21 MMOL/L (ref 22–30)
EOSINOPHIL # BLD AUTO: 0.2 10^3/UL (ref 0–0.6)
EOSINOPHIL NFR BLD AUTO: 1.4 % (ref 0–6)
ERYTHROCYTE [DISTWIDTH] IN BLOOD BY AUTOMATED COUNT: 15.9 % (ref 11.5–14)
GLUCOSE SERPL-MCNC: 102 MG/DL (ref 75–110)
HCT VFR BLD CALC: 28.5 % (ref 36–47)
HGB BLD-MCNC: 9.3 G/DL (ref 12–15.5)
LYMPHOCYTES # BLD AUTO: 1.1 10^3/UL (ref 0.5–4.7)
LYMPHOCYTES NFR BLD AUTO: 9 % (ref 13–45)
MCH RBC QN AUTO: 25.5 PG (ref 27–33.4)
MCHC RBC AUTO-ENTMCNC: 32.8 G/DL (ref 32–36)
MCV RBC AUTO: 78 FL (ref 80–97)
MONOCYTES # BLD AUTO: 0.7 10^3/UL (ref 0.1–1.4)
MONOCYTES NFR BLD AUTO: 5.3 % (ref 3–13)
NEUTROPHILS # BLD AUTO: 10.4 10^3/UL (ref 1.7–8.2)
NEUTS SEG NFR BLD AUTO: 83.8 % (ref 42–78)
PLATELET # BLD: 254 10^3/UL (ref 150–450)
POTASSIUM SERPL-SCNC: 4.7 MMOL/L (ref 3.6–5)
PROT SERPL-MCNC: 6 G/DL (ref 6.3–8.2)
RBC # BLD AUTO: 3.66 10^6/UL (ref 3.72–5.28)
TOTAL CELLS COUNTED % (AUTO): 100 %
WBC # BLD AUTO: 12.4 10^3/UL (ref 4–10.5)

## 2020-07-10 RX ADMIN — PIPERACILLIN AND TAZOBACTAM SCH MLS/HR: 3; .375 INJECTION, POWDER, LYOPHILIZED, FOR SOLUTION INTRAVENOUS; PARENTERAL at 00:40

## 2020-07-10 RX ADMIN — SODIUM CHLORIDE PRN MLS/HR: 9 INJECTION, SOLUTION INTRAVENOUS at 17:27

## 2020-07-10 RX ADMIN — PIPERACILLIN AND TAZOBACTAM SCH MLS/HR: 3; .375 INJECTION, POWDER, LYOPHILIZED, FOR SOLUTION INTRAVENOUS; PARENTERAL at 11:07

## 2020-07-10 RX ADMIN — OXYCODONE HYDROCHLORIDE AND ACETAMINOPHEN SCH MG: 500 TABLET ORAL at 11:09

## 2020-07-10 RX ADMIN — ENOXAPARIN SODIUM SCH MG: 40 INJECTION SUBCUTANEOUS at 11:08

## 2020-07-10 RX ADMIN — PIPERACILLIN AND TAZOBACTAM SCH MLS/HR: 3; .375 INJECTION, POWDER, LYOPHILIZED, FOR SOLUTION INTRAVENOUS; PARENTERAL at 17:25

## 2020-07-10 RX ADMIN — Medication SCH UNIT: at 11:10

## 2020-07-10 RX ADMIN — Medication SCH MG: at 11:10

## 2020-07-10 RX ADMIN — PIPERACILLIN AND TAZOBACTAM SCH MLS/HR: 3; .375 INJECTION, POWDER, LYOPHILIZED, FOR SOLUTION INTRAVENOUS; PARENTERAL at 06:09

## 2020-07-10 RX ADMIN — PANTOPRAZOLE SODIUM SCH: 40 TABLET, DELAYED RELEASE ORAL at 06:08

## 2020-07-11 LAB
ADD MANUAL DIFF: NO
ALBUMIN SERPL-MCNC: 2.5 G/DL (ref 3.5–5)
ALP SERPL-CCNC: 139 U/L (ref 38–126)
ANION GAP SERPL CALC-SCNC: 7 MMOL/L (ref 5–19)
ARTERIAL BLOOD FIO2: (no result)
ARTERIAL BLOOD H2CO3: 1.31 MMOL/L (ref 1.05–1.35)
ARTERIAL BLOOD HCO3: 22.3 MMOL/L (ref 20–24)
ARTERIAL BLOOD PCO2: 43.5 MMHG (ref 35–45)
ARTERIAL BLOOD PH: 7.33 (ref 7.35–7.45)
ARTERIAL BLOOD PO2: 52.8 MMHG (ref 80–100)
ARTERIAL BLOOD TOTAL CO2: 23.6 MMOL/L (ref 21–25)
AST SERPL-CCNC: 35 U/L (ref 14–36)
BASE EXCESS BLDA CALC-SCNC: -3.6 MMOL/L
BASOPHILS # BLD AUTO: 0.2 10^3/UL (ref 0–0.2)
BASOPHILS NFR BLD AUTO: 0.9 % (ref 0–2)
BILIRUB DIRECT SERPL-MCNC: 0.3 MG/DL (ref 0–0.4)
BILIRUB SERPL-MCNC: 0.9 MG/DL (ref 0.2–1.3)
BUN SERPL-MCNC: 9 MG/DL (ref 7–20)
CALCIUM: 8.9 MG/DL (ref 8.4–10.2)
CHLORIDE SERPL-SCNC: 114 MMOL/L (ref 98–107)
CO2 SERPL-SCNC: 19 MMOL/L (ref 22–30)
EOSINOPHIL # BLD AUTO: 0.5 10^3/UL (ref 0–0.6)
EOSINOPHIL NFR BLD AUTO: 2.7 % (ref 0–6)
ERYTHROCYTE [DISTWIDTH] IN BLOOD BY AUTOMATED COUNT: 16 % (ref 11.5–14)
GLUCOSE SERPL-MCNC: 144 MG/DL (ref 75–110)
HCT VFR BLD CALC: 30.9 % (ref 36–47)
HGB BLD-MCNC: 10.2 G/DL (ref 12–15.5)
LYMPHOCYTES # BLD AUTO: 3 10^3/UL (ref 0.5–4.7)
LYMPHOCYTES NFR BLD AUTO: 17.1 % (ref 13–45)
MCH RBC QN AUTO: 25.9 PG (ref 27–33.4)
MCHC RBC AUTO-ENTMCNC: 32.9 G/DL (ref 32–36)
MCV RBC AUTO: 79 FL (ref 80–97)
MONOCYTES # BLD AUTO: 0.7 10^3/UL (ref 0.1–1.4)
MONOCYTES NFR BLD AUTO: 3.7 % (ref 3–13)
NEUTROPHILS # BLD AUTO: 13.5 10^3/UL (ref 1.7–8.2)
NEUTS SEG NFR BLD AUTO: 75.6 % (ref 42–78)
PLATELET # BLD: 330 10^3/UL (ref 150–450)
POTASSIUM SERPL-SCNC: 4.7 MMOL/L (ref 3.6–5)
PROT SERPL-MCNC: 6.5 G/DL (ref 6.3–8.2)
RBC # BLD AUTO: 3.92 10^6/UL (ref 3.72–5.28)
SAO2 % BLDA: 84.9 % (ref 94–98)
TOTAL CELLS COUNTED % (AUTO): 100 %
WBC # BLD AUTO: 17.9 10^3/UL (ref 4–10.5)

## 2020-07-11 RX ADMIN — PIPERACILLIN AND TAZOBACTAM SCH MLS/HR: 3; .375 INJECTION, POWDER, LYOPHILIZED, FOR SOLUTION INTRAVENOUS; PARENTERAL at 05:22

## 2020-07-11 RX ADMIN — PIPERACILLIN AND TAZOBACTAM SCH MLS/HR: 3; .375 INJECTION, POWDER, LYOPHILIZED, FOR SOLUTION INTRAVENOUS; PARENTERAL at 00:00

## 2020-07-11 RX ADMIN — Medication SCH: at 12:52

## 2020-07-11 RX ADMIN — ENOXAPARIN SODIUM SCH MG: 40 INJECTION SUBCUTANEOUS at 11:06

## 2020-07-11 RX ADMIN — SODIUM CHLORIDE PRN MLS/HR: 9 INJECTION, SOLUTION INTRAVENOUS at 05:22

## 2020-07-11 RX ADMIN — Medication SCH: at 12:53

## 2020-07-11 RX ADMIN — SODIUM CHLORIDE PRN MLS/HR: 9 INJECTION, SOLUTION INTRAVENOUS at 14:59

## 2020-07-11 RX ADMIN — OXYCODONE HYDROCHLORIDE AND ACETAMINOPHEN SCH: 500 TABLET ORAL at 12:53

## 2020-07-11 RX ADMIN — PANTOPRAZOLE SODIUM SCH: 40 TABLET, DELAYED RELEASE ORAL at 05:24

## 2020-07-11 NOTE — CRITICAL CARE ADMISSION REPORT
HPI


Date:: 20


Time:: 09:00


Reason for ICU Reason:: Respiratory distress and likely needs intubation


Admission Date/Time & PCP: 


Admission Date/Time: 20 14:39





 Primary Care Provider: MEG MCCARTHY








HPI: 





This patient is a 54 yo woman who has MS and recurrent UTIs. She was admitted 

with candida in urine and staghorn calculus with moderate R hydronephrosis. 

Today she was eating and had a witnessed aspiration. She is non-verbal, not much

different than baseline. She also has gruntng respirations and if she does not 

quickly improve, as many aspirations do, she will need intubation.


History obtained from:: Old records and nursing staff.





- Diagnosis/Plan


(1) Aspiration into lower respiratory tract


Qualifiers: 


   Encounter type: initial encounter   Qualified Code(s): T17.800A - Unspecified

foreign body in other parts of respiratory tract causing asphyxiation, initial 

encounter   


Is this a current diagnosis for this admission?: Yes   


Plan: 


This is a common problem with MS and frequently leads to intubation. She was 

said to have aspirated on pears. Going forward her diet will likely need to be 

amended.








(2) Acute respiratory distress


Is this a current diagnosis for this admission?: Yes   


Plan: 


Her mental status may not be much different but her clinical picture may merit 

intubation if she does not improved quickly.








(3) Hydronephrosis of left kidney


Is this a current diagnosis for this admission?: Yes   


Plan: 


Likely due to stones 








(4) Recurrent UTI (urinary tract infection)


Is this a current diagnosis for this admission?: Yes   


Plan: 


At this point it is candiduria without evidence of systemic candidiasis. Also 

diabetic. Will add IV diflucan.








(5) Staghorn kidney stones


Is this a current diagnosis for this admission?: Yes   


Plan: 


She will likely need a urology referral and lithotripsy as the stone is > 1.5 

cm.








(6) Diabetes mellitus type 2 in nonobese


Is this a current diagnosis for this admission?: Yes   


Plan: 


Controlled.








(7) Hypoalbuminemia


Is this a current diagnosis for this admission?: Yes   


Plan: 


This will make her long term recovery difficult. If intubated will need NG 

feeds.





Plan Summary: 





If this patient does not rally quickly in the ICU she will need intubation





Past Medical History


Cardiac Medical History: Reports: Pulmonary Embolism - 2 years ago


   Denies: Atrial Fibrillation, Congestive Heart Failure, Myocardial Infarction,

Hyperlipidema, Hypertension


Pulmonary Medical History: 


   Denies: Asthma, Bronchitis, Chronic Obstructive Pulmonary Disease (COPD), 

Pneumonia, Respiratory Failure, Sleep Apnea, Tuberculosis


Neurological Medical History: 


   Denies: Migraine, Seizures


Endocrine Medical History: Reports: Diabetes Mellitus Type 2, Hypothyroidism


   Denies: Diabetes Mellitus Type 1


Renal/ Medical History: 


   Denies: End Stage Renal Disease


Malignancy Medical History: 


   Denies: Leukemia, Lung Cancer


GI Medical History: 


   Denies: Gastroesophageal Reflux Disease, Hiatal Hernia


Musculoskeltal Medical History: 


   Denies: Arthritis


Psychiatric Medical History: 


   Denies: Attention Deficit Hyperactivity Disorder, Bipolar Disorder, Dementia,

Depression


Hematology: Reports: Anemia


   Denies: Hemophilia, Sickle Cell Disease


Infectious Medical History: 


   Denies: HIV





Past Surgical History


Past Surgical History: Reports:  Section - x2, Cholecystectomy


   Denies: Appendectomy, Coronary Artery Bypass Graft, Gastric Bypass Surgery, 

Herniorrhaphy, Hysterectomy, Mastectomy, Pacemaker, Tonsillectomy, Tubal 

Ligation





Social/Family History





- Social History


Smoking Status: Never Smoker


Frequency of Alcohol Use: None


Hx Recreational Drug Use: No


Drugs: None


Hx Prescription Drug Abuse: No





- Medication/Allergies


Home Medications: 








Acetaminophen [Tylenol 325 mg Tablet] 650 mg PO Q6HP PRN 20 


Baclofen [Baclofen 10 mg Tablet] 10 mg PO Q8HP PRN 20 


Ascorbic Acid [Vitamin C] 1,000 mg PO DAILY #30 tablet 20 


Vitamin A [Vitamin A 10,000 Unit Capsule] 10,000 unit PO DAILY #30 capsule 

20 


Whey Protein Isolate [Beneprotein] 227 gm PO TID #90 powder 20 


Zinc Sulfate [Zinc-220 Capsule] 220 mg PO DAILY #30 capsule 20 








Allergies/Adverse Reactions: 


                                        





latex [Latex] Allergy (Intermediate, Verified 20 16:45)


   RASH/BLISTER


Sulfa (Sulfonamide Antibiotics) Allergy (Intermediate, Verified 20 16:45)


   RASH/HIVES


bee venom protein (honey bee) Allergy (Verified 20 16:45)


   Anaphylaxis











Review of Systems


ROS unobtainable: Due to mental status





Physical Exam


Vital Signs: 


                                        











Temp Pulse Resp BP Pulse Ox


 


 98.8 F   112 H  18   118/79   95 


 


 20 00:02  20 00:02  20 00:02  20 00:02  20 00:02








                                 Intake & Output











 07/10/20 07/11/20 07/12/20





 06:59 06:59 06:59


 


Intake Total 2850 1600 


 


Output Total 850 1400 


 


Balance 2000 200 


 


Weight 76 kg 80.3 kg 








                                  Weight/Height





Weight                           80.3 kg


Height                           5 ft 7 in








General appearance: PRESENT: disheveled, mild distress, other - Hanley face C/W 

chronic steroids.


Head exam: PRESENT: atraumatic, normocephalic


Eye exam: PRESENT: conjunctiva pink, EOMI, PERRLA.  ABSENT: scleral icterus


Ear exam: PRESENT: normal external ear exam


Mouth exam: PRESENT: moist, tongue midline


Respiratory exam: PRESENT: accessory muscle use, clear to auscultation elver, 

retraction, rhonchi


Cardiovascular exam: PRESENT: tachycardia


GI/Abdominal exam: PRESENT: normal bowel sounds, soft.  ABSENT: distended, 

guarding, mass, organolmegaly, rebound, tenderness


Rectal exam: PRESENT: deferred


Gentrourinary exam: PRESENT: indwelling catheter


Extremities exam: PRESENT: full ROM.  ABSENT: calf tenderness, clubbing, pedal 

edema


Musculoskeletal exam: PRESENT: normal inspection


Neurological exam: PRESENT: altered, CN II-XII grossly intact


Skin exam: PRESENT: dry, intact, warm.  ABSENT: cyanosis, rash





Laboratory/Radiographs


Laboratory Results: 


                                        





                                 07/10/20 04:57 





                                 07/10/20 04:57 





                                        











  20





  08:57


 


Carbonic Acid  1.31


 


HCO3/H2CO3 Ratio  17:1


 


ABG pH  7.33 L


 


ABG pCO2  43.5


 


ABG pO2  52.8 L


 


ABG HCO3  22.3


 


ABG O2 Saturation  84.9 L


 


ABG Base Excess  -3.6


 


FiO2  4L








                                        





20 13:08   Catheterized Urine   Urine Culture - Final


                            Yeast, Not Candida Albicans








Impressions: 


                                        





Abdomen/Pelvis CT  20 13:35


IMPRESSION:  1.6 x 0.8 cm left proximal ureteral stone with moderate left 

hydronephrosis


Bilateral intrarenal staghorn calculi


Complex cyst versus cystic mass left lower pole kidney.  Ultrasound may be 

useful for further characterization


 








Renal Ultrasound  20 00:00


IMPRESSION:  Queried left kidney inferior pole lesion is poorly characterized 

due to acoustic impedance.  Recommend dedicated renal CT or renal MR imaging for

 definitive characterization.


 








Chest X-Ray  20 00:00


IMPRESSION:  Examination somewhat limited due to patient rotation.  However, 

there appears to be faint airspace opacities involving the left lower lung, 

which may represent developing airspace disease to include clinically suspected 

aspiration.


 











EKG: 





ST


All labs, radiographs, diagnostic studies and EKGs were personally reviewed: Yes


In addition, reports of radiographic and diagnostic studies were read: Yes





Critical Time


Critical Time (minutes): 40


-: 


The care of a critically ill patient is dynamic.  This note represents a static 

moment in the admission process. Orders and treatments may be given 

simultaneously and urgently, and time is not representative of the treatment 

process.





This patient requires Critical Care secondary to life threatening organ or limb 

dysfunction.  Without Critical Care services, the patient is at risk for 

increased mortality and morbidity.

## 2020-07-11 NOTE — EKG REPORT
SEVERITY:- ABNORMAL ECG -

SINUS TACHYCARDIA

BORDERLINE LEFT AXIS DEVIATION

CONSIDER ANTEROSEPTAL INFARCT , RECENT

ABNORMAL T, CONSIDER ISCHEMIA, LATERAL LEADS

:

Confirmed by: Wilson Polanco MD 11-Jul-2020 16:13:59

## 2020-07-11 NOTE — RADIOLOGY REPORT (SQ)
EXAM DESCRIPTION:  CHEST SINGLE VIEW



IMAGES COMPLETED DATE/TIME:  7/11/2020 9:02 am



REASON FOR STUDY:  aspiration, low o2 sat



COMPARISON:  None.



EXAM PARAMETERS:  NUMBER OF VIEWS: One view.

TECHNIQUE: Single frontal radiographic view of the chest acquired.

RADIATION DOSE: NA

LIMITATIONS: Patient rotation



FINDINGS:  LUNGS AND PLEURA: Faint left lower lung airspace opacities and interval development of a s
mall left-sided pleural effusion.  No pneumothorax.

MEDIASTINUM AND HILAR STRUCTURES: No masses.  Contour normal.

HEART AND VASCULAR STRUCTURES: Heart normal in size.  Normal vasculature.

BONES: No acute findings.

HARDWARE: None in the chest.

OTHER: No other significant finding.



IMPRESSION:  Examination somewhat limited due to patient rotation.  However, there appears to be kyle
t airspace opacities involving the left lower lung, which may represent developing airspace disease t
o include clinically suspected aspiration.



TECHNICAL DOCUMENTATION:  JOB ID:  [id number]

 2011 [employer]- All Rights Reserved



Reading location - IP/workstation name: [email or web address]

## 2020-07-12 LAB
ADD MANUAL DIFF: NO
BASOPHILS # BLD AUTO: 0 10^3/UL (ref 0–0.2)
BASOPHILS NFR BLD AUTO: 0.4 % (ref 0–2)
EOSINOPHIL # BLD AUTO: 0.1 10^3/UL (ref 0–0.6)
EOSINOPHIL NFR BLD AUTO: 1.3 % (ref 0–6)
ERYTHROCYTE [DISTWIDTH] IN BLOOD BY AUTOMATED COUNT: 15.6 % (ref 11.5–14)
HCT VFR BLD CALC: 29.1 % (ref 36–47)
HGB BLD-MCNC: 9.4 G/DL (ref 12–15.5)
LYMPHOCYTES # BLD AUTO: 2.1 10^3/UL (ref 0.5–4.7)
LYMPHOCYTES NFR BLD AUTO: 21.2 % (ref 13–45)
MCH RBC QN AUTO: 25.1 PG (ref 27–33.4)
MCHC RBC AUTO-ENTMCNC: 32.2 G/DL (ref 32–36)
MCV RBC AUTO: 78 FL (ref 80–97)
MONOCYTES # BLD AUTO: 0.6 10^3/UL (ref 0.1–1.4)
MONOCYTES NFR BLD AUTO: 6.2 % (ref 3–13)
NEUTROPHILS # BLD AUTO: 6.9 10^3/UL (ref 1.7–8.2)
NEUTS SEG NFR BLD AUTO: 70.9 % (ref 42–78)
PLATELET # BLD: 246 10^3/UL (ref 150–450)
RBC # BLD AUTO: 3.73 10^6/UL (ref 3.72–5.28)
TOTAL CELLS COUNTED % (AUTO): 100 %
WBC # BLD AUTO: 9.8 10^3/UL (ref 4–10.5)

## 2020-07-12 RX ADMIN — Medication SCH: at 09:08

## 2020-07-12 RX ADMIN — SODIUM CHLORIDE PRN MLS/HR: 9 INJECTION, SOLUTION INTRAVENOUS at 15:19

## 2020-07-12 RX ADMIN — ENOXAPARIN SODIUM SCH MG: 40 INJECTION SUBCUTANEOUS at 10:51

## 2020-07-12 RX ADMIN — SODIUM CHLORIDE PRN MLS/HR: 9 INJECTION, SOLUTION INTRAVENOUS at 02:33

## 2020-07-12 RX ADMIN — OXYCODONE HYDROCHLORIDE AND ACETAMINOPHEN SCH: 500 TABLET ORAL at 09:08

## 2020-07-12 NOTE — PDOC CRITICAL CARE PROG REPORT
General


Date:: 07/12/20


ICU Day:: 1


Hospital Day:: 4


Resuscitation Status: Full Code


Events in the past 12 to 24 Hours:: 





Never intubated. Off and on bipap. Ready for IMC.


Review of systems relevant to events:: 





Neurologic, pulmonary.


Reason for ICU Addmission:: Intubation risk much less.





- Medications:


Medications reviewed and adjusted accordingly: Yes


Vasopressors:: 





None


Sedation:: 





None





Physical Exam


Vital Signs: 


                                        











Temp Pulse Resp BP Pulse Ox


 


 98 F   115 H  14   109/61   100 


 


 07/12/20 00:00  07/11/20 15:09  07/12/20 04:11  07/12/20 04:11  07/12/20 04:11








                                 Intake & Output











 07/11/20 07/12/20 07/13/20





 06:59 06:59 06:59


 


Intake Total 1600 1962 


 


Output Total 1400 2 


 


Balance 200 1960 


 


Weight 80.3 kg 82.1 kg 








                                  Weight/Height





Weight                           82.1 kg


Height                           5 ft 7 in








General appearance: PRESENT: no acute distress, cooperative


Head exam: PRESENT: atraumatic, normocephalic


Eye exam: PRESENT: conjunctiva pink, EOMI, PERRLA.  ABSENT: scleral icterus


Ear exam: PRESENT: normal external ear exam


Mouth exam: PRESENT: moist, tongue midline


Respiratory exam: PRESENT: clear to auscultation elver, decreased breath sounds.  

ABSENT: rales, rhonchi, wheezes


Cardiovascular exam: PRESENT: RRR, tachycardia - Mild.  ABSENT: diastolic murmu

r, rubs, systolic murmur


GI/Abdominal exam: PRESENT: normal bowel sounds, soft.  ABSENT: distended, 

guarding, mass, organolmegaly, rebound, tenderness


Rectal exam: PRESENT: deferred


Gentrourinary exam: PRESENT: indwelling catheter


Extremities exam: PRESENT: full ROM, +1 edema.  ABSENT: calf tenderness, 

clubbing, pedal edema


Musculoskeletal exam: PRESENT: normal inspection


Neurological exam: PRESENT: alert, awake, oriented to person, oriented to place,

oriented to time, oriented to situation, CN II-XII grossly intact, other - Due 

to her MS, she is responsive and oriented but not very verbal. Daughter said 

yesterday she is back to baseline.


Psychiatric exam: PRESENT: appropriate affect, normal mood.  ABSENT: homicidal 

ideation, suicidal ideation


Skin exam: PRESENT: dry, intact, warm.  ABSENT: cyanosis, rash





Laboratory/Radiographs


Laboratory Results: 


                                        





                                 07/12/20 04:25 





                                 07/11/20 09:55 





                                        











  07/11/20 07/11/20 07/11/20





  08:57 09:55 09:55


 


WBC   17.9 H 


 


RBC   3.92 


 


Hgb   10.2 L 


 


Hct   30.9 L 


 


MCV   79 L 


 


MCH   25.9 L 


 


MCHC   32.9 


 


RDW   16.0 H 


 


Plt Count   330 


 


Seg Neutrophils %   75.6 


 


Carbonic Acid  1.31  


 


HCO3/H2CO3 Ratio  17:1  


 


ABG pH  7.33 L  


 


ABG pCO2  43.5  


 


ABG pO2  52.8 L  


 


ABG HCO3  22.3  


 


ABG O2 Saturation  84.9 L  


 


ABG Base Excess  -3.6  


 


FiO2  4L  


 


Sodium    140.0


 


Potassium    4.7


 


Chloride    114 H


 


Carbon Dioxide    19 L


 


Anion Gap    7


 


BUN    9


 


Creatinine    0.48 L


 


Est GFR ( Amer)    > 60


 


Glucose    144 H


 


Calcium    8.9


 


Total Bilirubin    0.9


 


AST    35


 


Alkaline Phosphatase    139 H


 


Total Protein    6.5


 


Albumin    2.5 L














  07/12/20





  04:25


 


WBC  9.8


 


RBC  3.73


 


Hgb  9.4 L


 


Hct  29.1 L


 


MCV  78 L


 


MCH  25.1 L


 


MCHC  32.2


 


RDW  15.6 H


 


Plt Count  246


 


Seg Neutrophils %  70.9


 


Carbonic Acid 


 


HCO3/H2CO3 Ratio 


 


ABG pH 


 


ABG pCO2 


 


ABG pO2 


 


ABG HCO3 


 


ABG O2 Saturation 


 


ABG Base Excess 


 


FiO2 


 


Sodium 


 


Potassium 


 


Chloride 


 


Carbon Dioxide 


 


Anion Gap 


 


BUN 


 


Creatinine 


 


Est GFR (African Amer) 


 


Glucose 


 


Calcium 


 


Total Bilirubin 


 


AST 


 


Alkaline Phosphatase 


 


Total Protein 


 


Albumin 











Impressions: 


                                        





Abdomen/Pelvis CT  07/08/20 13:35


IMPRESSION:  1.6 x 0.8 cm left proximal ureteral stone with moderate left 

hydronephrosis


Bilateral intrarenal staghorn calculi


Complex cyst versus cystic mass left lower pole kidney.  Ultrasound may be 

useful for further characterization


 








Renal Ultrasound  07/09/20 00:00


IMPRESSION:  Queried left kidney inferior pole lesion is poorly characterized 

due to acoustic impedance.  Recommend dedicated renal CT or renal MR imaging for

definitive characterization.


 








Chest X-Ray  07/11/20 00:00


IMPRESSION:  Examination somewhat limited due to patient rotation.  However, 

there appears to be faint airspace opacities involving the left lower lung, 

which may represent developing airspace disease to include clinically suspected 

aspiration.


 











All labs, radiographs, diagnostic studies and EKGs were personally reviewed: Yes


In addition, reports of radiographic and diagnostic studies were read: Yes





Assessment and Plan





- Diagnosis


(1) Aspiration into lower respiratory tract


Qualifiers: 


   Encounter type: initial encounter   Qualified Code(s): T17.800A - Unspecified

foreign body in other parts of respiratory tract causing asphyxiation, initial 

encounter   


Is this a current diagnosis for this admission?: Yes   


Plan: 


Clinically she has no significant findings. Her daughter says she has not 

aspirated before, but coughs when eating. My sense is there has been a low level

of chronic aspiration as this is common with severe MS for years. Speech 

evaluation ordered and she would likely benefir from NG feedings, however 

getting a speech evaluatin without the encumbrance of an NG is in order.








(2) Acute respiratory distress


Is this a current diagnosis for this admission?: Yes   


Plan: 


She is in no distress but has been off and on bipap.








(3) Hydronephrosis of left kidney


Is this a current diagnosis for this admission?: Yes   


Plan: 


Probaby chronic.








(4) Recurrent UTI (urinary tract infection)


Is this a current diagnosis for this admission?: Yes   


Plan: 


Final urine culture with yeast. On diflucan, no signs of systemic candidiasis.








(5) Staghorn kidney stones


Is this a current diagnosis for this admission?: Yes   


Plan: 


Needs urological opinion when stable and discharged.








(6) Diabetes mellitus type 2 in nonobese


Is this a current diagnosis for this admission?: Yes   





(7) Hypoalbuminemia


Is this a current diagnosis for this admission?: Yes   


Plan: 


Controlled. -144.





Plan Summary: 





She is stable to go to [facility].





Critical Time


Critical Time (minutes): 30


Level of Care: IMCU


Anticipated discharge: Home with Homehealth


Within: Other


-: 


1.  The care of a critical patient is a dynamic process.  This note is a represe

ntative synopsis but static in nature.  The timeframe for treatments given in 

order is not necessarily the actual time these treatments may have been done.





2.  This patient requires critical care secondary to ongoing requirements for 

therapy not offered or safe outside the critical care environment.  Transfer to 

a lower level of care will result in altered life or limb morbidity and 

mortality.





3.  Multidisciplinary rounds completed.





4.  ABCDE bundle addressed.

## 2020-07-13 LAB
ADD MANUAL DIFF: NO
ANION GAP SERPL CALC-SCNC: 4 MMOL/L (ref 5–19)
BASOPHILS # BLD AUTO: 0 10^3/UL (ref 0–0.2)
BASOPHILS NFR BLD AUTO: 0.6 % (ref 0–2)
BUN SERPL-MCNC: 4 MG/DL (ref 7–20)
CALCIUM: 8.3 MG/DL (ref 8.4–10.2)
CHLORIDE SERPL-SCNC: 112 MMOL/L (ref 98–107)
CO2 SERPL-SCNC: 23 MMOL/L (ref 22–30)
EOSINOPHIL # BLD AUTO: 0.2 10^3/UL (ref 0–0.6)
EOSINOPHIL NFR BLD AUTO: 4 % (ref 0–6)
ERYTHROCYTE [DISTWIDTH] IN BLOOD BY AUTOMATED COUNT: 15.5 % (ref 11.5–14)
GLUCOSE SERPL-MCNC: 77 MG/DL (ref 75–110)
HCT VFR BLD CALC: 25.3 % (ref 36–47)
HGB BLD-MCNC: 8.3 G/DL (ref 12–15.5)
LYMPHOCYTES # BLD AUTO: 1.9 10^3/UL (ref 0.5–4.7)
LYMPHOCYTES NFR BLD AUTO: 30.3 % (ref 13–45)
MCH RBC QN AUTO: 25.8 PG (ref 27–33.4)
MCHC RBC AUTO-ENTMCNC: 32.6 G/DL (ref 32–36)
MCV RBC AUTO: 79 FL (ref 80–97)
MONOCYTES # BLD AUTO: 0.5 10^3/UL (ref 0.1–1.4)
MONOCYTES NFR BLD AUTO: 7.9 % (ref 3–13)
NEUTROPHILS # BLD AUTO: 3.5 10^3/UL (ref 1.7–8.2)
NEUTS SEG NFR BLD AUTO: 57.2 % (ref 42–78)
PLATELET # BLD: 255 10^3/UL (ref 150–450)
POTASSIUM SERPL-SCNC: 3.7 MMOL/L (ref 3.6–5)
RBC # BLD AUTO: 3.21 10^6/UL (ref 3.72–5.28)
TOTAL CELLS COUNTED % (AUTO): 100 %
WBC # BLD AUTO: 6.2 10^3/UL (ref 4–10.5)

## 2020-07-13 RX ADMIN — OXYCODONE HYDROCHLORIDE AND ACETAMINOPHEN SCH: 500 TABLET ORAL at 10:25

## 2020-07-13 RX ADMIN — Medication SCH: at 10:25

## 2020-07-13 RX ADMIN — Medication SCH: at 10:24

## 2020-07-13 RX ADMIN — SODIUM CHLORIDE PRN MLS/HR: 9 INJECTION, SOLUTION INTRAVENOUS at 09:35

## 2020-07-13 RX ADMIN — SODIUM CHLORIDE PRN MLS/HR: 9 INJECTION, SOLUTION INTRAVENOUS at 21:03

## 2020-07-13 RX ADMIN — ENOXAPARIN SODIUM SCH MG: 40 INJECTION SUBCUTANEOUS at 09:35

## 2020-07-13 NOTE — PDOC PROGRESS REPORT
Subjective


Progress Note for:: 07/10/20


Subjective:: 





Patient's any fever or chills. No chest pain or difficulty with breathing. 

Patient's food intake remain very poor.


Reason For Visit: 


RECURRENT UTI WIHT UROSEPSIS,SACRAL DECUBITUS ULCE








Physical Exam


Vital Signs: 


                                        











Temp Pulse Resp BP Pulse Ox


 


 97.3 F   116 H  16   105/63   97 


 


 07/10/20 08:03  07/10/20 08:03  07/10/20 08:03  07/10/20 08:03  07/10/20 08:03








                                 Intake & Output











 07/09/20 07/10/20 07/11/20





 06:59 06:59 06:59


 


Intake Total 1450 1850 


 


Output Total 150 850 


 


Balance 1300 1000 


 


Weight 76.5 kg 76 kg 











Physical Exam: 





General appearance: PRESENT: no acute distress


Head exam: PRESENT: atraumatic, normocephalic


Eye exam: PRESENT: conjunctiva pink.  ABSENT: pallor, scleral icterus


Respiratory exam: PRESENT: decreased breath sounds - at lung bases


Cardiovascular exam: PRESENT: RRR, +S1, +S2.  ABSENT: diastolic murmur, rubs, 

systolic murmur


GI/Abdominal exam: PRESENT: normal bowel sounds, soft.  ABSENT: distended, gu

arding, mass, organomegaly, rebound, tenderness


Extremities exam: ABSENT: pedal edema


Neurological exam: PRESENT: alert, awake, oriented to person, oriented to place,

oriented to time, oriented to situation


Psychiatric exam: ABSENT: agitated, anxious


Skin exam: PRESENT: dry, warm.  ABSENT: intact - bilateral sacral region stage 3

decubitii





Results


Laboratory Results: 


                                        





                                 07/10/20 04:57 





                                 07/10/20 04:57 





                                        











  07/09/20 07/10/20 07/10/20





  04:55 04:57 04:57


 


WBC   12.4 H 


 


RBC   3.66 L 


 


Hgb   9.3 L 


 


Hct   28.5 L 


 


MCV   78 L 


 


MCH   25.5 L 


 


MCHC   32.8 


 


RDW   15.9 H 


 


Plt Count   254 


 


Seg Neutrophils %   83.8 H 


 


Sodium    140.3


 


Potassium    4.7


 


Chloride    113 H


 


Carbon Dioxide    21 L


 


Anion Gap    6


 


BUN    11


 


Creatinine    0.45 L


 


Est GFR ( Amer)    > 60


 


Glucose    102


 


Calcium    8.4


 


Magnesium  1.5 L   2.2


 


Total Bilirubin    0.8


 


AST    30


 


Alkaline Phosphatase    139 H


 


Total Protein    6.0 L


 


Albumin    2.3 L











Impressions: 


                                        





Chest X-Ray  07/08/20 12:39


IMPRESSION:  NO ACUTE RADIOGRAPHIC FINDING IN THE CHEST.


 








Abdomen/Pelvis CT  07/08/20 13:35


IMPRESSION:  1.6 x 0.8 cm left proximal ureteral stone with moderate left 

hydronephrosis


Bilateral intrarenal staghorn calculi


Complex cyst versus cystic mass left lower pole kidney.  Ultrasound may be 

useful for further characterization


 








Renal Ultrasound  07/09/20 00:00


IMPRESSION:  Queried left kidney inferior pole lesion is poorly characterized 

due to acoustic impedance.  Recommend dedicated renal CT or renal MR imaging for

definitive characterization.


 














Assessment & Plan





- Diagnosis


(1) Recurrent UTI (urinary tract infection)


Is this a current diagnosis for this admission?: Yes   





(2) Sepsis with metabolic encephalopathy


Is this a current diagnosis for this admission?: Yes   





(3) Hypokalemia


Is this a current diagnosis for this admission?: Yes   





(4) Staghorn kidney stones


Is this a current diagnosis for this admission?: Yes   





(5) Hydronephrosis of left kidney


Is this a current diagnosis for this admission?: Yes   





(6) Pressure ulcer of unspecified buttock, stage 3


Qualifiers: 


   Laterality: unspecified laterality   Qualified Code(s): L89.303 - Pressure 

ulcer of unspecified buttock, stage 3   


Is this a current diagnosis for this admission?: Yes   





(7) Multiple sclerosis, primary chronic progressive


Is this a current diagnosis for this admission?: Yes   





- Time


Time Spent with patient: 25-34 minutes


Level of Care: MEDICAL


Medications reviewed and adjusted accordingly: Yes


Anticipated discharge: Home with Homehealth


Within: Other





- Inpatient Certification


Based on my medical assessment, after consideration of the patient's 

comorbidities, presenting symptoms, or acuity I expect that the services needed 

warrant INPATIENT care.: Yes


I certify that my determination is in accordance with my understanding of 

Medicare's requirements for reasonable and necessary INPATIENT services [42 CFR 

412.3e].: Yes


Medical Necessity: Significant Comorbidiites Make Outpatient Treatment Too 

Risky, Need Close Monitoring Due to Risk of Patient Decompensation, Need For IV 

Fluids, Need for IV Antibiotics, Risk of Complication if Not Cared For in [facility], Risk of Diagnosis Which Will Require Inpatient Eval/Care/Monitoring


Post Hospital Care: D/C Planner Documentation





- Plan Summary


Plan Summary: 





Continue current antibiotic therapy and follow up on culture results.

## 2020-07-13 NOTE — PDOC PROGRESS REPORT
Subjective


Progress Note for:: 07/13/20


Subjective:: 





Patient had episode aspiration necessitating transfer to ICU for possible 

ventilatory support. She was managed conservatively and presently back on IMCU. 

No reported fever or chills. No chest pain or difficulty with breathing. 

Patient's food intake remain very poor. She was seen in consultation by speech 

pathologist with recommendation for mechanical soft ground meat consistency.


Reason For Visit: 


RECURRENT UTI WIHT UROSEPSIS,SACRAL DECUBITUS ULCE








Physical Exam


Vital Signs: 


                                        











Temp Pulse Resp BP Pulse Ox


 


 97.4 F   94   18   134/68 H  100 


 


 07/13/20 12:51  07/13/20 14:00  07/13/20 12:51  07/13/20 12:51  07/13/20 12:51








                                 Intake & Output











 07/12/20 07/13/20 07/14/20





 06:59 06:59 06:59


 


Intake Total 1962 2000 


 


Output Total 2 1000 


 


Balance 1960 1000 


 


Weight 82.1 kg 80 kg 80 kg











Physical Exam: 


General appearance: PRESENT: no acute distress


Head exam: PRESENT: atraumatic, normocephalic


Eye exam: PRESENT: conjunctiva pink.  ABSENT: pallor, scleral icterus


Respiratory exam: PRESENT: decreased breath sounds - at lung bases


Cardiovascular exam: PRESENT: RRR, +S1, +S2.  ABSENT: diastolic murmur, rubs, 

systolic murmur


GI/Abdominal exam: PRESENT: normal bowel sounds, soft.  ABSENT: distended, 

guarding, mass, organomegaly, rebound, tenderness


Extremities exam: ABSENT: pedal edema


Neurological exam: PRESENT: alert, awake, oriented to person, oriented to place,

oriented to time, oriented to situation


Psychiatric exam: ABSENT: agitated, anxious


Skin exam: PRESENT: dry, warm.  ABSENT: intact - bilateral sacral region stage 3

decubitii








Results


Laboratory Results: 


                                        





                                 07/13/20 06:57 





                                 07/13/20 06:57 





                                        











  07/13/20 07/13/20





  06:57 06:57


 


WBC  6.2 


 


RBC  3.21 L 


 


Hgb  8.3 L 


 


Hct  25.3 L 


 


MCV  79 L 


 


MCH  25.8 L 


 


MCHC  32.6 


 


RDW  15.5 H 


 


Plt Count  255 


 


Seg Neutrophils %  57.2 


 


Sodium   139.4


 


Potassium   3.7


 


Chloride   112 H


 


Carbon Dioxide   23


 


Anion Gap   4 L


 


BUN   4 L


 


Creatinine   0.44 L


 


Est GFR (African Amer)   > 60


 


Glucose   77


 


Calcium   8.3 L








                                        





07/08/20 13:30   Blood   Blood Culture - Final


                            NO GROWTH IN 5 DAYS


07/08/20 12:57   Blood   Blood Culture - Final


                            NO GROWTH IN 5 DAYS








Impressions: 


                                        





Abdomen/Pelvis CT  07/08/20 13:35


IMPRESSION:  1.6 x 0.8 cm left proximal ureteral stone with moderate left 

hydronephrosis


Bilateral intrarenal staghorn calculi


Complex cyst versus cystic mass left lower pole kidney.  Ultrasound may be 

useful for further characterization


 








Renal Ultrasound  07/09/20 00:00


IMPRESSION:  Queried left kidney inferior pole lesion is poorly characterized 

due to acoustic impedance.  Recommend dedicated renal CT or renal MR imaging for

definitive characterization.


 








Chest X-Ray  07/11/20 00:00


IMPRESSION:  Examination somewhat limited due to patient rotation.  However, 

there appears to be faint airspace opacities involving the left lower lung, 

which may represent developing airspace disease to include clinically suspected 

aspiration.


 














Assessment & Plan





- Diagnosis


(1) Recurrent UTI (urinary tract infection)


Is this a current diagnosis for this admission?: Yes   





(2) Sepsis with metabolic encephalopathy


Is this a current diagnosis for this admission?: Yes   





(3) Hypokalemia


Is this a current diagnosis for this admission?: Yes   





(4) Staghorn kidney stones


Is this a current diagnosis for this admission?: Yes   





(5) Hydronephrosis of left kidney


Is this a current diagnosis for this admission?: Yes   





(6) Pressure ulcer of unspecified buttock, stage 3


Qualifiers: 


   Laterality: unspecified laterality   Qualified Code(s): L89.303 - Pressure 

ulcer of unspecified buttock, stage 3   


Is this a current diagnosis for this admission?: Yes   





(7) Multiple sclerosis, primary chronic progressive


Is this a current diagnosis for this admission?: Yes   





(8) Acute respiratory distress


Is this a current diagnosis for this admission?: Yes   


Plan: 


Improving breathing status with good saturation.








(9) Aspiration into lower respiratory tract


Qualifiers: 


   Encounter type: subsequent encounter   Qualified Code(s): T17.800D - 

Unspecified foreign body in other parts of respiratory tract causing 

asphyxiation, subsequent encounter   


Is this a current diagnosis for this admission?: Yes   


Plan: 


Improving breathing status with good saturation.








(10) Candida cystitis


Is this a current diagnosis for this admission?: Yes   


Plan: 


Start on IV Diflucan therapy.








- Time


Time Spent with patient: 25-34 minutes


Level of Care: IMCU


Medications reviewed and adjusted accordingly: Yes


Anticipated discharge: Home with Homehealth


Within: Other





- Inpatient Certification


Medical Necessity: Significant Comorbidiites Make Outpatient Treatment Too 

Risky, Need Close Monitoring Due to Risk of Patient Decompensation, Need For IV 

Fluids, Need For Continuous Telemetry Monitoring, Risk of Complication if Not 

Cared For in Hospital, Risk of Diagnosis Which Will Require Inpatient 

Eval/Care/Monitoring


Post Hospital Care: D/C Planner Documentation





- Plan Summary


Plan Summary: 





Interval events and input noted. Start on IV Diflucan for Candida Cystitis in 

view of her recurrent infection and CT scan findings. We will try oral feeding 

but if unsuccessful consider tube feeding option with the patient. Her dysphagia

probable due to her advance multiple sclerosis.

## 2020-07-14 RX ADMIN — OXYCODONE HYDROCHLORIDE AND ACETAMINOPHEN SCH MG: 500 TABLET ORAL at 10:37

## 2020-07-14 RX ADMIN — FLUCONAZOLE, SODIUM CHLORIDE SCH MLS/HR: 2 INJECTION INTRAVENOUS at 18:15

## 2020-07-14 RX ADMIN — NYSTATIN CREAM SCH APPLIC: 100000 CREAM TOPICAL at 19:43

## 2020-07-14 RX ADMIN — Medication SCH MG: at 10:36

## 2020-07-14 RX ADMIN — SODIUM CHLORIDE PRN MLS/HR: 9 INJECTION, SOLUTION INTRAVENOUS at 18:14

## 2020-07-14 RX ADMIN — Medication SCH UNIT: at 10:37

## 2020-07-14 RX ADMIN — SODIUM CHLORIDE PRN MLS/HR: 9 INJECTION, SOLUTION INTRAVENOUS at 08:36

## 2020-07-14 RX ADMIN — ENOXAPARIN SODIUM SCH MG: 40 INJECTION SUBCUTANEOUS at 10:37

## 2020-07-14 NOTE — PDOC PROGRESS REPORT
Subjective


Progress Note for:: 07/14/20


Subjective:: 





No reported fever or chills. No chest pain or difficulty with breathing. No 

nausea, vomiting, or abdominal pain. Oral intake remain a challenge. Urinary 

incontinence is hampering her wound care. There is reported groin region skin 

yeast rash. 


Reason For Visit: 


RECURRENT UTI WIHT UROSEPSIS,SACRAL DECUBITUS ULCE








Physical Exam


Vital Signs: 


                                        











Temp Pulse Resp BP Pulse Ox


 


 97.8 F   100   16   146/77 H  92 


 


 07/14/20 16:24  07/14/20 16:46  07/14/20 16:46  07/14/20 16:24  07/14/20 16:46








                                 Intake & Output











 07/13/20 07/14/20 07/15/20





 06:59 06:59 06:59


 


Intake Total 2000 1260 1000


 


Output Total 1000 1450 1050


 


Balance 1000 -190 -50


 


Weight 80 kg 97.2 kg 














Results


Laboratory Results: 


                                        





                                 07/13/20 06:57 





                                 07/13/20 06:57 





                                        





07/08/20 13:30   Blood   Blood Culture - Final


                            NO GROWTH IN 5 DAYS








Impressions: 


                                        





Abdomen/Pelvis CT  07/08/20 13:35


IMPRESSION:  1.6 x 0.8 cm left proximal ureteral stone with moderate left 

hydronephrosis


Bilateral intrarenal staghorn calculi


Complex cyst versus cystic mass left lower pole kidney.  Ultrasound may be 

useful for further characterization


 








Renal Ultrasound  07/09/20 00:00


IMPRESSION:  Queried left kidney inferior pole lesion is poorly characterized 

due to acoustic impedance.  Recommend dedicated renal CT or renal MR imaging for

definitive characterization.


 








Chest X-Ray  07/11/20 00:00


IMPRESSION:  Examination somewhat limited due to patient rotation.  However, 

there appears to be faint airspace opacities involving the left lower lung, 

which may represent developing airspace disease to include clinically suspected 

aspiration.


 














Assessment & Plan





- Diagnosis


(1) Recurrent UTI (urinary tract infection)


Is this a current diagnosis for this admission?: Yes   





(2) Sepsis with metabolic encephalopathy


Is this a current diagnosis for this admission?: Yes   





(3) Hypokalemia


Is this a current diagnosis for this admission?: Yes   





(4) Staghorn kidney stones


Is this a current diagnosis for this admission?: Yes   





(5) Hydronephrosis of left kidney


Is this a current diagnosis for this admission?: Yes   





(6) Pressure ulcer of unspecified buttock, stage 3


Qualifiers: 


   Laterality: unspecified laterality   Qualified Code(s): L89.303 - Pressure 

ulcer of unspecified buttock, stage 3   


Is this a current diagnosis for this admission?: Yes   





(7) Multiple sclerosis, primary chronic progressive


Is this a current diagnosis for this admission?: Yes   





(8) Acute respiratory distress


Is this a current diagnosis for this admission?: Yes   





(9) Aspiration into lower respiratory tract


Qualifiers: 


   Encounter type: subsequent encounter   Qualified Code(s): T17.800D - 

Unspecified foreign body in other parts of respiratory tract causing 

asphyxiation, subsequent encounter   


Is this a current diagnosis for this admission?: Yes   





(10) Candida cystitis


Is this a current diagnosis for this admission?: Yes   





(11) Urinary incontinence due to immobility


Is this a current diagnosis for this admission?: Yes   


Plan: 


Insert Cherry catheter for incontinence and wound management.








(12) Yeast dermatitis


Is this a current diagnosis for this admission?: Yes   


Plan: 


Start on Nystatin cream topically to affected region bid.








- Time


Time Spent with patient: 25-34 minutes


Level of Care: IMCU


Medications reviewed and adjusted accordingly: Yes


Anticipated discharge: Home with Homehealth, SNF


Within: Other





- Inpatient Certification


Based on my medical assessment, after consideration of the patient's 

comorbidities, presenting symptoms, or acuity I expect that the services needed 

warrant INPATIENT care.: Yes


I certify that my determination is in accordance with my understanding of 

Medicare's requirements for reasonable and necessary INPATIENT services [42 CFR 

412.3e].: Yes


Medical Necessity: Significant Comorbidiites Make Outpatient Treatment Too 

Risky, Need Close Monitoring Due to Risk of Patient Decompensation, Need For IV 

Fluids, Need For Continuous Telemetry Monitoring, Need for IV Antibiotics, Risk 

of Complication if Not Cared For in Hospital, Risk of Diagnosis Which Will Requ

[place]au Inpatient Eval/Care/Monitoring


Post Hospital Care: D/C Planner Documentation





- Plan Summary


Plan Summary: 





See attending physician orders for details about care plan.

## 2020-07-15 LAB
ADD MANUAL DIFF: NO
ALBUMIN SERPL-MCNC: 2.3 G/DL (ref 3.5–5)
ALP SERPL-CCNC: 101 U/L (ref 38–126)
ANION GAP SERPL CALC-SCNC: 5 MMOL/L (ref 5–19)
AST SERPL-CCNC: 22 U/L (ref 14–36)
BASOPHILS # BLD AUTO: 0 10^3/UL (ref 0–0.2)
BASOPHILS NFR BLD AUTO: 0.5 % (ref 0–2)
BILIRUB DIRECT SERPL-MCNC: 0.1 MG/DL (ref 0–0.4)
BILIRUB SERPL-MCNC: 0.6 MG/DL (ref 0.2–1.3)
BUN SERPL-MCNC: 2 MG/DL (ref 7–20)
CALCIUM: 8.5 MG/DL (ref 8.4–10.2)
CHLORIDE SERPL-SCNC: 110 MMOL/L (ref 98–107)
CO2 SERPL-SCNC: 24 MMOL/L (ref 22–30)
EOSINOPHIL # BLD AUTO: 0.2 10^3/UL (ref 0–0.6)
EOSINOPHIL NFR BLD AUTO: 1.8 % (ref 0–6)
ERYTHROCYTE [DISTWIDTH] IN BLOOD BY AUTOMATED COUNT: 15.6 % (ref 11.5–14)
GLUCOSE SERPL-MCNC: 73 MG/DL (ref 75–110)
HCT VFR BLD CALC: 28.2 % (ref 36–47)
HGB BLD-MCNC: 9.2 G/DL (ref 12–15.5)
LYMPHOCYTES # BLD AUTO: 2.1 10^3/UL (ref 0.5–4.7)
LYMPHOCYTES NFR BLD AUTO: 21.3 % (ref 13–45)
MCH RBC QN AUTO: 25.8 PG (ref 27–33.4)
MCHC RBC AUTO-ENTMCNC: 32.6 G/DL (ref 32–36)
MCV RBC AUTO: 79 FL (ref 80–97)
MONOCYTES # BLD AUTO: 0.6 10^3/UL (ref 0.1–1.4)
MONOCYTES NFR BLD AUTO: 6.4 % (ref 3–13)
NEUTROPHILS # BLD AUTO: 6.9 10^3/UL (ref 1.7–8.2)
NEUTS SEG NFR BLD AUTO: 70 % (ref 42–78)
PLATELET # BLD: 352 10^3/UL (ref 150–450)
POTASSIUM SERPL-SCNC: 3.9 MMOL/L (ref 3.6–5)
PROT SERPL-MCNC: 5.9 G/DL (ref 6.3–8.2)
RBC # BLD AUTO: 3.55 10^6/UL (ref 3.72–5.28)
TOTAL CELLS COUNTED % (AUTO): 100 %
WBC # BLD AUTO: 9.8 10^3/UL (ref 4–10.5)

## 2020-07-15 RX ADMIN — FLUCONAZOLE, SODIUM CHLORIDE SCH MLS/HR: 2 INJECTION INTRAVENOUS at 17:22

## 2020-07-15 RX ADMIN — ENOXAPARIN SODIUM SCH MG: 40 INJECTION SUBCUTANEOUS at 09:52

## 2020-07-15 RX ADMIN — Medication SCH UNIT: at 09:52

## 2020-07-15 RX ADMIN — SODIUM CHLORIDE PRN MLS/HR: 9 INJECTION, SOLUTION INTRAVENOUS at 05:34

## 2020-07-15 RX ADMIN — Medication SCH MG: at 09:52

## 2020-07-15 RX ADMIN — NYSTATIN CREAM SCH APPLIC: 100000 CREAM TOPICAL at 17:28

## 2020-07-15 RX ADMIN — OXYCODONE HYDROCHLORIDE AND ACETAMINOPHEN SCH MG: 500 TABLET ORAL at 09:52

## 2020-07-15 RX ADMIN — NYSTATIN CREAM SCH APPLIC: 100000 CREAM TOPICAL at 09:52

## 2020-07-15 NOTE — PDOC PROGRESS REPORT
Subjective


Progress Note for:: 07/15/20


Subjective:: 





No reported fever or chills. No chest pain or difficulty with breathing. PO 

intake remain a problem. About 25% of lunch consumed so far today. No nausea, 

vomiting, or abdominal pain.


Reason For Visit: 


RECURRENT UTI WIHT UROSEPSIS,SACRAL DECUBITUS ULCE








Physical Exam


Vital Signs: 


                                        











Temp Pulse Resp BP Pulse Ox


 


 98.0 F   121 H  14   127/68 H  100 


 


 07/15/20 12:16  07/15/20 16:49  07/15/20 16:49  07/15/20 16:49  07/15/20 16:49








                                 Intake & Output











 07/14/20 07/15/20 07/16/20





 06:59 06:59 06:59


 


Intake Total 1260 3063 430


 


Output Total 1450 1635 


 


Balance -190 1428 430


 


Weight 97.2 kg 99.4 kg 











Physical Exam: 





General appearance: PRESENT: no acute distress


Head exam: PRESENT: atraumatic, normocephalic


Eye exam: PRESENT: conjunctiva pink.  ABSENT: pallor, scleral icterus


Respiratory exam: PRESENT: decreased breath sounds - at lung bases


Cardiovascular exam: PRESENT: RRR, +S1, +S2.  ABSENT: diastolic murmur, rubs, 

systolic murmur


GI/Abdominal exam: PRESENT: normal bowel sounds, soft.  ABSENT: distended, 

guarding, mass, organomegaly, rebound, tenderness


Extremities exam: ABSENT: pedal edema


Neurological exam: PRESENT: alert, awake, oriented to person, oriented to place,

oriented to time, oriented to situation


Psychiatric exam: ABSENT: agitated, anxious


Skin exam: PRESENT: dry, warm.  ABSENT: intact - bilateral sacral region stage 3

decubitii








Results


Laboratory Results: 


                                        





                                 07/15/20 06:03 





                                 07/15/20 06:03 





                                        











  07/15/20 07/15/20





  06:03 06:03


 


WBC  9.8 


 


RBC  3.55 L 


 


Hgb  9.2 L 


 


Hct  28.2 L 


 


MCV  79 L 


 


MCH  25.8 L 


 


MCHC  32.6 


 


RDW  15.6 H 


 


Plt Count  352 


 


Seg Neutrophils %  70.0 


 


Sodium   138.8


 


Potassium   3.9


 


Chloride   110 H


 


Carbon Dioxide   24


 


Anion Gap   5


 


BUN   2 L


 


Creatinine   0.41 L


 


Est GFR (African Amer)   > 60


 


Glucose   73 L


 


Calcium   8.5


 


Total Bilirubin   0.6


 


AST   22


 


Alkaline Phosphatase   101


 


Total Protein   5.9 L


 


Albumin   2.3 L











Impressions: 


                                        





Abdomen/Pelvis CT  07/08/20 13:35


IMPRESSION:  1.6 x 0.8 cm left proximal ureteral stone with moderate left 

hydronephrosis


Bilateral intrarenal staghorn calculi


Complex cyst versus cystic mass left lower pole kidney.  Ultrasound may be 

useful for further characterization


 








Renal Ultrasound  07/09/20 00:00


IMPRESSION:  Queried left kidney inferior pole lesion is poorly characterized 

due to acoustic impedance.  Recommend dedicated renal CT or renal MR imaging for

definitive characterization.


 








Chest X-Ray  07/11/20 00:00


IMPRESSION:  Examination somewhat limited due to patient rotation.  However, 

there appears to be faint airspace opacities involving the left lower lung, 

which may represent developing airspace disease to include clinically suspected 

aspiration.


 














Assessment & Plan





- Diagnosis


(1) Recurrent UTI (urinary tract infection)


Is this a current diagnosis for this admission?: Yes   





(2) Sepsis with metabolic encephalopathy


Is this a current diagnosis for this admission?: Yes   





(3) Hypokalemia


Is this a current diagnosis for this admission?: Yes   





(4) Staghorn kidney stones


Is this a current diagnosis for this admission?: Yes   





(5) Hydronephrosis of left kidney


Is this a current diagnosis for this admission?: Yes   





(6) Pressure ulcer of unspecified buttock, stage 3


Qualifiers: 


   Laterality: unspecified laterality   Qualified Code(s): L89.303 - Pressure 

ulcer of unspecified buttock, stage 3   


Is this a current diagnosis for this admission?: Yes   





(7) Multiple sclerosis, primary chronic progressive


Is this a current diagnosis for this admission?: Yes   





(8) Acute respiratory distress


Is this a current diagnosis for this admission?: Yes   





(9) Aspiration into lower respiratory tract


Qualifiers: 


   Encounter type: subsequent encounter   Qualified Code(s): T17.800D - 

Unspecified foreign body in other parts of respiratory tract causing 

asphyxiation, subsequent encounter   


Is this a current diagnosis for this admission?: Yes   





(10) Candida cystitis


Is this a current diagnosis for this admission?: Yes   





(11) Urinary incontinence due to immobility


Is this a current diagnosis for this admission?: Yes   





(12) Yeast dermatitis


Is this a current diagnosis for this admission?: Yes   





- Time


Time Spent with patient: 25-34 minutes


Level of Care: IMCU


Medications reviewed and adjusted accordingly: Yes


Anticipated discharge: Home with Homehealth


Within: Other





- Inpatient Certification


Based on my medical assessment, after consideration of the patient's 

comorbidities, presenting symptoms, or acuity I expect that the services needed 

warrant INPATIENT care.: Yes


I certify that my determination is in accordance with my understanding of 

Medicare's requirements for reasonable and necessary INPATIENT services [42 CFR 

412.3e].: Yes


Medical Necessity: Significant Comorbidiites Make Outpatient Treatment Too 

Risky, Need Close Monitoring Due to Risk of Patient Decompensation, Need For IV 

Fluids, Need For Continuous Telemetry Monitoring, Need for IV Antibiotics, Risk 

of Complication if Not Cared For in Hospital, Risk of Diagnosis Which Will 

Require Inpatient Eval/Care/Monitoring


Post Hospital Care: D/C Planner Documentation





- Plan Summary


Plan Summary: 





Continue current medication management. Start on oral supplementation to meet 

nutritional needs for wound healing.

## 2020-07-16 RX ADMIN — FLUCONAZOLE, SODIUM CHLORIDE SCH MLS/HR: 2 INJECTION INTRAVENOUS at 17:43

## 2020-07-16 RX ADMIN — NYSTATIN CREAM SCH APPLIC: 100000 CREAM TOPICAL at 09:53

## 2020-07-16 RX ADMIN — OXYCODONE HYDROCHLORIDE AND ACETAMINOPHEN SCH MG: 500 TABLET ORAL at 09:50

## 2020-07-16 RX ADMIN — Medication SCH MG: at 09:50

## 2020-07-16 RX ADMIN — METOPROLOL TARTRATE SCH MG: 25 TABLET, FILM COATED ORAL at 17:39

## 2020-07-16 RX ADMIN — NYSTATIN CREAM SCH APPLIC: 100000 CREAM TOPICAL at 17:44

## 2020-07-16 RX ADMIN — SODIUM CHLORIDE PRN MLS/HR: 9 INJECTION, SOLUTION INTRAVENOUS at 05:28

## 2020-07-16 RX ADMIN — Medication SCH UNIT: at 09:50

## 2020-07-16 RX ADMIN — ENOXAPARIN SODIUM SCH MG: 40 INJECTION SUBCUTANEOUS at 09:50

## 2020-07-16 NOTE — PDOC PROGRESS REPORT
Subjective


Progress Note for:: 07/16/20


Subjective:: 





No reported fever or chills. No chest pain or difficulty with breathing. No 

nausea, vomiting, or abdominal pain. Oral intake remain a challenge with patient

expressed preference for Atkins Strawberry shake supplement!


Reason For Visit: 


RECURRENT UTI WIHT UROSEPSIS,SACRAL DECUBITUS ULCE








Physical Exam


Vital Signs: 


                                        











Temp Pulse Resp BP Pulse Ox


 


 99.2 F   139 H  23 H  145/83 H  100 


 


 07/16/20 16:31  07/16/20 16:31  07/16/20 16:31  07/16/20 16:31  07/16/20 16:31








                                 Intake & Output











 07/15/20 07/16/20 07/17/20





 06:59 06:59 06:59


 


Intake Total 3063 1310 


 


Output Total 1635 2175 


 


Balance 1428 -865 


 


Weight 99.4 kg 101.3 kg 101.3 kg











Physical Exam: 





General appearance: PRESENT: no acute distress


Head exam: PRESENT: atraumatic, normocephalic


Eye exam: PRESENT: conjunctiva pink.  ABSENT: pallor, scleral icterus


Respiratory exam: PRESENT: decreased breath sounds - at lung bases


Cardiovascular exam: PRESENT: RRR, +S1, +S2.  ABSENT: diastolic murmur, rubs, 

systolic murmur


GI/Abdominal exam: PRESENT: normal bowel sounds, soft.  ABSENT: distended, 

guarding, mass, organomegaly, rebound, tenderness


Extremities exam: ABSENT: pedal edema


Neurological exam: PRESENT: alert, awake, oriented to person, oriented to place,

oriented to time, oriented to situation


Psychiatric exam: ABSENT: agitated, anxious


Skin exam: PRESENT: dry, warm.  ABSENT: intact - bilateral sacral region stage 3

decubitii





Results


Laboratory Results: 


                                        





                                 07/15/20 06:03 





                                 07/15/20 06:03 








Impressions: 


                                        





Abdomen/Pelvis CT  07/08/20 13:35


IMPRESSION:  1.6 x 0.8 cm left proximal ureteral stone with moderate left 

hydronephrosis


Bilateral intrarenal staghorn calculi


Complex cyst versus cystic mass left lower pole kidney.  Ultrasound may be 

useful for further characterization


 








Renal Ultrasound  07/09/20 00:00


IMPRESSION:  Queried left kidney inferior pole lesion is poorly characterized 

due to acoustic impedance.  Recommend dedicated renal CT or renal MR imaging for

definitive characterization.


 








Chest X-Ray  07/11/20 00:00


IMPRESSION:  Examination somewhat limited due to patient rotation.  However, 

there appears to be faint airspace opacities involving the left lower lung, 

which may represent developing airspace disease to include clinically suspected 

aspiration.


 














Assessment & Plan





- Diagnosis


(1) Recurrent UTI (urinary tract infection)


Is this a current diagnosis for this admission?: Yes   





(2) Sepsis with metabolic encephalopathy


Is this a current diagnosis for this admission?: Yes   





(3) Hypokalemia


Is this a current diagnosis for this admission?: Yes   





(4) Staghorn kidney stones


Is this a current diagnosis for this admission?: Yes   





(5) Hydronephrosis of left kidney


Is this a current diagnosis for this admission?: Yes   





(6) Pressure ulcer of unspecified buttock, stage 3


Qualifiers: 


   Laterality: unspecified laterality   Qualified Code(s): L89.303 - Pressure 

ulcer of unspecified buttock, stage 3   


Is this a current diagnosis for this admission?: Yes   





(7) Multiple sclerosis, primary chronic progressive


Is this a current diagnosis for this admission?: Yes   





(8) Acute respiratory distress


Is this a current diagnosis for this admission?: Yes   





(9) Aspiration into lower respiratory tract


Qualifiers: 


   Encounter type: subsequent encounter   Qualified Code(s): T17.800D - 

Unspecified foreign body in other parts of respiratory tract causing asphyxia

tion, subsequent encounter   


Is this a current diagnosis for this admission?: Yes   





(10) Candida cystitis


Is this a current diagnosis for this admission?: Yes   





(11) Urinary incontinence due to immobility


Is this a current diagnosis for this admission?: Yes   





(12) Yeast dermatitis


Is this a current diagnosis for this admission?: Yes   





- Time


Time Spent with patient: 25-34 minutes


Level of Care: IMCU


Medications reviewed and adjusted accordingly: Yes


Anticipated discharge: Home with Homehealth


Within: Other





- Inpatient Certification


Based on my medical assessment, after consideration of the patient's 

comorbidities, presenting symptoms, or acuity I expect that the services needed 

warrant INPATIENT care.: Yes


I certify that my determination is in accordance with my understanding of 

Medicare's requirements for reasonable and necessary INPATIENT services [42 CFR 

412.3e].: Yes


Medical Necessity: Significant Comorbidiites Make Outpatient Treatment Too 

Risky, Need Close Monitoring Due to Risk of Patient Decompensation, Need For IV 

Fluids, Need For Continuous Telemetry Monitoring, Need for IV Antibiotics, Risk 

of Complication if Not Cared For in Hospital, Risk of Diagnosis Which Will 

Require Inpatient Eval/Care/Monitoring


Post Hospital Care: D/C Planner Documentation





- Plan Summary


Plan Summary: 


Start on Metoprolol Tartrate 12.65 mg p.o bid. D/C IV Diflucan. Start on 

Diflucan 200 mg p.o daily. Continue all other current medication management.

## 2020-07-16 NOTE — RADIOLOGY REPORT (SQ)
CLINICAL INDICATION: possible aspiration and respiratory

distress.



TECHNIQUE: A single portable AP  view was obtained of the chest

at 2139 hours.



COMPARISON: July 11, 2020.



FINDINGS: The cardiomediastinal  silhouette is top normal but

stable. The lungs demonstrate progressive airspace disease left

lung base with small adjacent effusion. Right lung is grossly

clear.. No pneumothorax.



IMPRESSION: Progressive airspace disease left lung base and

adjacent small pleural effusion..

## 2020-07-17 RX ADMIN — FLUCONAZOLE SCH MG: 100 TABLET ORAL at 17:47

## 2020-07-17 RX ADMIN — METOPROLOL TARTRATE SCH MG: 25 TABLET, FILM COATED ORAL at 06:11

## 2020-07-17 RX ADMIN — METOPROLOL TARTRATE SCH MG: 25 TABLET, FILM COATED ORAL at 17:45

## 2020-07-17 RX ADMIN — ENOXAPARIN SODIUM SCH MG: 40 INJECTION SUBCUTANEOUS at 09:51

## 2020-07-17 RX ADMIN — OXYCODONE HYDROCHLORIDE AND ACETAMINOPHEN SCH MG: 500 TABLET ORAL at 09:50

## 2020-07-17 RX ADMIN — Medication SCH MG: at 09:50

## 2020-07-17 RX ADMIN — SODIUM CHLORIDE PRN MLS/HR: 9 INJECTION, SOLUTION INTRAVENOUS at 16:49

## 2020-07-17 RX ADMIN — Medication SCH UNIT: at 09:50

## 2020-07-17 RX ADMIN — NYSTATIN CREAM SCH APPLIC: 100000 CREAM TOPICAL at 17:45

## 2020-07-17 RX ADMIN — NYSTATIN CREAM SCH APPLIC: 100000 CREAM TOPICAL at 09:56

## 2020-07-17 RX ADMIN — ACETAMINOPHEN PRN MG: 325 TABLET ORAL at 11:56

## 2020-07-17 NOTE — PDOC PROGRESS REPORT
Subjective


Progress Note for:: 07/17/20


Subjective:: 





Patient had episode of aspiration with desaturation last night with need for 

BiPAP support. There was reported fever earlier this afternoon. Her chest X ray 

revealed progressive consolidation nd effusion in left lung lower lobe. No 

reported chest pain. No nausea, vomiting, or abdominal pain. Oral intake is 

slightly better so far today.


Reason For Visit: 


RECURRENT UTI WIHT UROSEPSIS,SACRAL DECUBITUS ULCE








Physical Exam


Vital Signs: 


                                        











Temp Pulse Resp BP Pulse Ox


 


 99.7 F   118 H  22 H  98/47 L  97 


 


 07/17/20 12:56  07/17/20 14:00  07/17/20 11:37  07/17/20 11:37  07/17/20 11:37








                                 Intake & Output











 07/16/20 07/17/20 07/18/20





 06:59 06:59 06:59


 


Intake Total 1310 400 118


 


Output Total 2175 875 


 


Balance -865 -475 118


 


Weight 101.3 kg 98.2 kg 











Physical Exam: 





General appearance: PRESENT: no acute distress


Head exam: PRESENT: atraumatic, normocephalic


Eye exam: PRESENT: conjunctiva pink.  ABSENT: pallor, scleral icterus


Respiratory exam: PRESENT: decreased breath sounds - at lung bases


Cardiovascular exam: PRESENT: RRR, +S1, +S2.  ABSENT: diastolic murmur, rubs, 

systolic murmur


GI/Abdominal exam: PRESENT: normal bowel sounds, soft.  ABSENT: distended, 

guarding, mass, organomegaly, rebound, tenderness


Extremities exam: ABSENT: pedal edema


Neurological exam: PRESENT: alert, awake, oriented to person, oriented to place,

oriented to time, oriented to situation


Psychiatric exam: ABSENT: agitated, anxious


Skin exam: PRESENT: dry, warm.  ABSENT: intact - bilateral sacral region stage 3

decubitii








Results


Laboratory Results: 


                                        





                                 07/15/20 06:03 





                                 07/15/20 06:03 








Impressions: 


                                        





Abdomen/Pelvis CT  07/08/20 13:35


IMPRESSION:  1.6 x 0.8 cm left proximal ureteral stone with moderate left 

hydronephrosis


Bilateral intrarenal staghorn calculi


Complex cyst versus cystic mass left lower pole kidney.  Ultrasound may be 

useful for further characterization


 








Renal Ultrasound  07/09/20 00:00


IMPRESSION:  Queried left kidney inferior pole lesion is poorly characterized 

due to acoustic impedance.  Recommend dedicated renal CT or renal MR imaging for

definitive characterization.


 








Chest X-Ray  07/16/20 21:19


IMPRESSION: Progressive airspace disease left lung base and


adjacent small pleural effusion..


 














Assessment & Plan





- Diagnosis


(1) Recurrent UTI (urinary tract infection)


Is this a current diagnosis for this admission?: Yes   





(2) Sepsis with metabolic encephalopathy


Is this a current diagnosis for this admission?: Yes   





(3) Hypokalemia


Is this a current diagnosis for this admission?: Yes   





(4) Staghorn kidney stones


Is this a current diagnosis for this admission?: Yes   





(5) Hydronephrosis of left kidney


Is this a current diagnosis for this admission?: Yes   





(6) Pressure ulcer of unspecified buttock, stage 3


Qualifiers: 


   Laterality: unspecified laterality   Qualified Code(s): L89.303 - Pressure u

lcer of unspecified buttock, stage 3   


Is this a current diagnosis for this admission?: Yes   





(7) Multiple sclerosis, primary chronic progressive


Is this a current diagnosis for this admission?: Yes   





(8) Acute respiratory distress


Is this a current diagnosis for this admission?: Yes   





(9) Aspiration into lower respiratory tract


Qualifiers: 


   Encounter type: subsequent encounter   Qualified Code(s): T17.800D - 

Unspecified foreign body in other parts of respiratory tract causing 

asphyxiation, subsequent encounter   


Is this a current diagnosis for this admission?: Yes   





(10) Candida cystitis


Is this a current diagnosis for this admission?: Yes   





(11) Urinary incontinence due to immobility


Is this a current diagnosis for this admission?: Yes   





(12) Yeast dermatitis


Is this a current diagnosis for this admission?: Yes   





- Time


Time Spent with patient: 25-34 minutes


Level of Care: IMCU


Medications reviewed and adjusted accordingly: Yes


Anticipated discharge: Home with Homehealth, SNF


Within: Other





- Inpatient Certification


Based on my medical assessment, after consideration of the patient's comorbiditi

es, presenting symptoms, or acuity I expect that the services needed warrant 

INPATIENT care.: Yes


I certify that my determination is in accordance with my understanding of Med

icare's requirements for reasonable and necessary INPATIENT services [42 CFR 

412.3e].: Yes


Medical Necessity: Significant Comorbidiites Make Outpatient Treatment Too 

Risky, Need Close Monitoring Due to Risk of Patient Decompensation, Need For IV 

Fluids, Need for IV Antibiotics, Risk of Complication if Not Cared For in 

Hospital, Risk of Diagnosis Which Will Require Inpatient Eval/Care/Monitoring


Post Hospital Care: D/C Planner Documentation, D/C or Transfer Summary





- Plan Summary


Plan Summary: 





Continue current medication management. Obtain CBC with diff in am.

## 2020-07-18 LAB
ADD MANUAL DIFF: NO
ANION GAP SERPL CALC-SCNC: 3 MMOL/L (ref 5–19)
BASOPHILS # BLD AUTO: 0.1 10^3/UL (ref 0–0.2)
BASOPHILS NFR BLD AUTO: 0.5 % (ref 0–2)
BUN SERPL-MCNC: 10 MG/DL (ref 7–20)
CALCIUM: 8.5 MG/DL (ref 8.4–10.2)
CHLORIDE SERPL-SCNC: 107 MMOL/L (ref 98–107)
CO2 SERPL-SCNC: 28 MMOL/L (ref 22–30)
EOSINOPHIL # BLD AUTO: 0.1 10^3/UL (ref 0–0.6)
EOSINOPHIL NFR BLD AUTO: 1.4 % (ref 0–6)
ERYTHROCYTE [DISTWIDTH] IN BLOOD BY AUTOMATED COUNT: 16.2 % (ref 11.5–14)
GLUCOSE SERPL-MCNC: 117 MG/DL (ref 75–110)
HCT VFR BLD CALC: 25.5 % (ref 36–47)
HGB BLD-MCNC: 8.4 G/DL (ref 12–15.5)
LYMPHOCYTES # BLD AUTO: 1.3 10^3/UL (ref 0.5–4.7)
LYMPHOCYTES NFR BLD AUTO: 13.3 % (ref 13–45)
MCH RBC QN AUTO: 26.2 PG (ref 27–33.4)
MCHC RBC AUTO-ENTMCNC: 32.8 G/DL (ref 32–36)
MCV RBC AUTO: 80 FL (ref 80–97)
MONOCYTES # BLD AUTO: 0.6 10^3/UL (ref 0.1–1.4)
MONOCYTES NFR BLD AUTO: 6.3 % (ref 3–13)
NEUTROPHILS # BLD AUTO: 7.6 10^3/UL (ref 1.7–8.2)
NEUTS SEG NFR BLD AUTO: 78.5 % (ref 42–78)
PLATELET # BLD: 328 10^3/UL (ref 150–450)
POTASSIUM SERPL-SCNC: 3.7 MMOL/L (ref 3.6–5)
RBC # BLD AUTO: 3.19 10^6/UL (ref 3.72–5.28)
TOTAL CELLS COUNTED % (AUTO): 100 %
WBC # BLD AUTO: 9.7 10^3/UL (ref 4–10.5)

## 2020-07-18 RX ADMIN — METOPROLOL TARTRATE SCH MG: 25 TABLET, FILM COATED ORAL at 05:54

## 2020-07-18 RX ADMIN — METOPROLOL TARTRATE SCH MG: 25 TABLET, FILM COATED ORAL at 18:39

## 2020-07-18 RX ADMIN — ENOXAPARIN SODIUM SCH MG: 40 INJECTION SUBCUTANEOUS at 11:49

## 2020-07-18 RX ADMIN — OXYCODONE HYDROCHLORIDE AND ACETAMINOPHEN SCH MG: 500 TABLET ORAL at 11:48

## 2020-07-18 RX ADMIN — FLUCONAZOLE SCH MG: 100 TABLET ORAL at 18:39

## 2020-07-18 RX ADMIN — Medication SCH MG: at 11:48

## 2020-07-18 RX ADMIN — NYSTATIN CREAM SCH APPLIC: 100000 CREAM TOPICAL at 18:40

## 2020-07-18 RX ADMIN — NYSTATIN CREAM SCH APPLIC: 100000 CREAM TOPICAL at 10:02

## 2020-07-18 RX ADMIN — Medication SCH UNIT: at 11:48

## 2020-07-18 NOTE — PDOC PROGRESS REPORT
Subjective


Progress Note for:: 07/18/20


Subjective:: 





Patient seen by the bedside admitted for management of pyelonephritis and other 

complications


Reason For Visit: 


RECURRENT UTI WIHT UROSEPSIS,SACRAL DECUBITUS ULCE








Physical Exam


Vital Signs: 


                                        











Temp Pulse Resp BP Pulse Ox


 


 98.0 F   104 H  22 H  124/65   100 


 


 07/18/20 11:08  07/18/20 14:00  07/18/20 11:08  07/18/20 11:08  07/18/20 11:08








                                 Intake & Output











 07/17/20 07/18/20 07/19/20





 06:59 06:59 06:59


 


Intake Total 400 1526 


 


Output Total 875 950 


 


Balance -475 576 


 


Weight 98.2 kg 95.5 kg 











General appearance: PRESENT: no acute distress


Eye exam: PRESENT: PERRLA


Respiratory exam: PRESENT: clear to auscultation elver


Cardiovascular exam: PRESENT: +S1, +S2





Results


Laboratory Results: 


                                        





                                 07/18/20 06:32 





                                 07/18/20 06:32 





                                        











  07/18/20 07/18/20





  06:32 06:32


 


WBC  9.7 


 


RBC  3.19 L 


 


Hgb  8.4 L 


 


Hct  25.5 L 


 


MCV  80 


 


MCH  26.2 L 


 


MCHC  32.8 


 


RDW  16.2 H 


 


Plt Count  328 


 


Seg Neutrophils %  78.5 H 


 


Sodium   138.2


 


Potassium   3.7


 


Chloride   107


 


Carbon Dioxide   28


 


Anion Gap   3 L


 


BUN   10


 


Creatinine   0.41 L


 


Est GFR (African Amer)   > 60


 


Glucose   117 H


 


Calcium   8.5











Impressions: 


                                        





Abdomen/Pelvis CT  07/08/20 13:35


IMPRESSION:  1.6 x 0.8 cm left proximal ureteral stone with moderate left 

hydronephrosis


Bilateral intrarenal staghorn calculi


Complex cyst versus cystic mass left lower pole kidney.  Ultrasound may be 

useful for further characterization


 








Renal Ultrasound  07/09/20 00:00


IMPRESSION:  Queried left kidney inferior pole lesion is poorly characterized 

due to acoustic impedance.  Recommend dedicated renal CT or renal MR imaging for

definitive characterization.


 








Chest X-Ray  07/16/20 21:19


IMPRESSION: Progressive airspace disease left lung base and


adjacent small pleural effusion..


 














Assessment & Plan





- Diagnosis


(1) Sepsis


Qualifiers: 


   Sepsis type: sepsis due to unspecified organism   Sepsis acute organ 

dysfunction status: unspecified   Qualified Code(s): A41.9 - Sepsis, unspecified

organism   


Is this a current diagnosis for this admission?: Yes   





(2) Focal pyelonephritis


Is this a current diagnosis for this admission?: Yes   





(3) Sepsis with metabolic encephalopathy


Is this a current diagnosis for this admission?: Yes   





- Time


Time Spent with patient: 25-34 minutes


Level of Care: IMCU


Medications reviewed and adjusted accordingly: Yes


Anticipated discharge: Home


Within: Other





- Plan Summary


Plan Summary: 





Continue present management

## 2020-07-19 LAB
ABSOLUTE LYMPHOCYTES# (MANUAL): 0.5 10^3/UL (ref 0.5–4.7)
ABSOLUTE MONOCYTES # (MANUAL): 0.2 10^3/UL (ref 0.1–1.4)
ADD MANUAL DIFF: YES
ANION GAP SERPL CALC-SCNC: 6 MMOL/L (ref 5–19)
ANISOCYTOSIS BLD QL SMEAR: (no result)
APPEARANCE UR: (no result)
APTT PPP: YELLOW S
ARTERIAL BLOOD FIO2: (no result)
ARTERIAL BLOOD H2CO3: 1.13 MMOL/L (ref 1.05–1.35)
ARTERIAL BLOOD HCO3: 27.2 MMOL/L (ref 20–24)
ARTERIAL BLOOD PCO2: 37.6 MMHG (ref 35–45)
ARTERIAL BLOOD PH: 7.48 (ref 7.35–7.45)
ARTERIAL BLOOD PO2: 95.3 MMHG (ref 80–100)
ARTERIAL BLOOD TOTAL CO2: 28.3 MMOL/L (ref 21–25)
BASE EXCESS BLDA CALC-SCNC: 3.4 MMOL/L
BASOPHILS NFR BLD MANUAL: 1 % (ref 0–2)
BILIRUB UR QL STRIP: NEGATIVE
BUN SERPL-MCNC: 7 MG/DL (ref 7–20)
CALCIUM: 8.8 MG/DL (ref 8.4–10.2)
CHLORIDE SERPL-SCNC: 103 MMOL/L (ref 98–107)
CO2 SERPL-SCNC: 27 MMOL/L (ref 22–30)
EOSINOPHIL NFR BLD MANUAL: 0 % (ref 0–6)
ERYTHROCYTE [DISTWIDTH] IN BLOOD BY AUTOMATED COUNT: 16.3 % (ref 11.5–14)
GLUCOSE SERPL-MCNC: 110 MG/DL (ref 75–110)
GLUCOSE UR STRIP-MCNC: NEGATIVE MG/DL
HCT VFR BLD CALC: 28 % (ref 36–47)
HGB BLD-MCNC: 9.3 G/DL (ref 12–15.5)
INR PPP: 1.08
KETONES UR STRIP-MCNC: 20 MG/DL
MCH RBC QN AUTO: 25.9 PG (ref 27–33.4)
MCHC RBC AUTO-ENTMCNC: 33 G/DL (ref 32–36)
MCV RBC AUTO: 79 FL (ref 80–97)
MONOCYTES % (MANUAL): 1 % (ref 3–13)
PH UR STRIP: 7 [PH] (ref 5–9)
PLATELET # BLD: 358 10^3/UL (ref 150–450)
PLATELET COMMENT: ADEQUATE
POTASSIUM SERPL-SCNC: 4.1 MMOL/L (ref 3.6–5)
PROT UR STRIP-MCNC: NEGATIVE MG/DL
PROTHROMBIN TIME: 14.1 SEC (ref 11.4–15.4)
RBC # BLD AUTO: 3.57 10^6/UL (ref 3.72–5.28)
SAO2 % BLDA: 97.7 % (ref 94–98)
SEGMENTED NEUTROPHILS % (MAN): 95 % (ref 42–78)
SP GR UR STRIP: 1.01
TOTAL CELLS COUNTED BLD: 100
TOXIC GRANULES BLD QL SMEAR: SLIGHT
UROBILINOGEN UR-MCNC: NEGATIVE MG/DL (ref ?–2)
VARIANT LYMPHS NFR BLD MANUAL: 3 % (ref 13–45)
WBC # BLD AUTO: 15.8 10^3/UL (ref 4–10.5)
WBC TOXIC VACUOLES BLD QL SMEAR: PRESENT

## 2020-07-19 RX ADMIN — FLUCONAZOLE SCH MG: 100 TABLET ORAL at 17:41

## 2020-07-19 RX ADMIN — NYSTATIN CREAM SCH: 100000 CREAM TOPICAL at 09:38

## 2020-07-19 RX ADMIN — METOPROLOL TARTRATE SCH MG: 25 TABLET, FILM COATED ORAL at 05:03

## 2020-07-19 RX ADMIN — Medication SCH: at 09:38

## 2020-07-19 RX ADMIN — ACETAMINOPHEN PRN MG: 325 TABLET ORAL at 10:04

## 2020-07-19 RX ADMIN — PIPERACILLIN AND TAZOBACTAM SCH MLS/HR: 4; .5 INJECTION, POWDER, LYOPHILIZED, FOR SOLUTION INTRAVENOUS; PARENTERAL at 17:44

## 2020-07-19 RX ADMIN — NYSTATIN CREAM SCH APPLIC: 100000 CREAM TOPICAL at 17:45

## 2020-07-19 RX ADMIN — Medication SCH: at 15:53

## 2020-07-19 RX ADMIN — ACETAMINOPHEN PRN MG: 325 TABLET ORAL at 00:10

## 2020-07-19 RX ADMIN — SODIUM CHLORIDE PRN MLS/HR: 9 INJECTION, SOLUTION INTRAVENOUS at 05:04

## 2020-07-19 RX ADMIN — OXYCODONE HYDROCHLORIDE AND ACETAMINOPHEN SCH: 500 TABLET ORAL at 09:38

## 2020-07-19 RX ADMIN — METOPROLOL TARTRATE SCH MG: 25 TABLET, FILM COATED ORAL at 17:41

## 2020-07-19 RX ADMIN — ENOXAPARIN SODIUM SCH: 40 INJECTION SUBCUTANEOUS at 09:37

## 2020-07-19 NOTE — RADIOLOGY REPORT (SQ)
EXAM DESCRIPTION:  CHEST SINGLE VIEW



IMAGES COMPLETED DATE/TIME:  7/19/2020 3:23 pm



REASON FOR STUDY:  sepsis



COMPARISON:  7/16/2020



EXAM PARAMETERS:  NUMBER OF VIEWS: One view.

TECHNIQUE: Single frontal radiographic view of the chest acquired.

RADIATION DOSE: NA

LIMITATIONS: None.



FINDINGS:  LUNGS AND PLEURA: Small left pleural effusion not significantly changed.

MEDIASTINUM AND HILAR STRUCTURES: No masses.  Contour normal.

HEART AND VASCULAR STRUCTURES: Heart normal in size.  Normal vasculature.

BONES: No acute findings.

HARDWARE: None in the chest.

OTHER: No other significant finding.



IMPRESSION:  No significant change.



TECHNICAL DOCUMENTATION:  JOB ID:  [id number]

 2011 [employer]- All Rights Reserved



Reading location - IP/workstation name: [email or web address]

## 2020-07-19 NOTE — PDOC PROGRESS REPORT
Subjective


Progress Note for:: 07/19/20


Subjective:: 





Patient with severe MS and flaccid paralysis, showing signs of sepsis, with 

increased body temperature, sinus tachycardia, source of infection is not clear,

get septic work-up, blood culture, urine culture chest x-ray, start empiric 

Zosyn


Reason For Visit: 


RECURRENT UTI WIHT UROSEPSIS,SACRAL DECUBITUS ULCE








Physical Exam


Vital Signs: 


                                        











Temp Pulse Resp BP Pulse Ox


 


 100.8 F H  120 H  19   118/49 L  99 


 


 07/19/20 11:25  07/19/20 14:00  07/19/20 11:25  07/19/20 11:25  07/19/20 11:25








                                 Intake & Output











 07/18/20 07/19/20 07/20/20





 06:59 06:59 06:59


 


Intake Total 1526 1337 


 


Output Total 950 1050 


 


Balance 576 287 


 


Weight 95.5 kg 94.4 kg 











General appearance: PRESENT: no acute distress


Eye exam: PRESENT: PERRLA


Respiratory exam: PRESENT: clear to auscultation elver


Cardiovascular exam: PRESENT: +S1, +S2


GI/Abdominal exam: PRESENT: soft


Neurological exam: PRESENT: alert





Results


Laboratory Results: 


                                        





                                 07/19/20 11:40 





                                 07/19/20 11:40 





                                        











  07/19/20 07/19/20 07/19/20





  10:27 10:27 11:40


 


WBC  Cancelled   15.8 H


 


RBC  Cancelled   3.57 L


 


Hgb  Cancelled   9.3 L


 


Hct  Cancelled   28.0 L


 


MCV  Cancelled   79 L


 


MCH  Cancelled   25.9 L


 


MCHC  Cancelled   33.0


 


RDW  Cancelled   16.3 H


 


Plt Count  Cancelled   358


 


Seg Neutrophils %  Cancelled   Not Reportable


 


Sodium   Cancelled 


 


Potassium   Cancelled 


 


Chloride   Cancelled 


 


Carbon Dioxide   Cancelled 


 


Anion Gap   Cancelled 


 


BUN   Cancelled 


 


Creatinine   Cancelled 


 


Est GFR ( Amer)   Cancelled 


 


Est GFR (Non-Af Amer)   Cancelled 


 


Glucose   Cancelled 


 


Calcium   Cancelled 














  07/19/20





  11:40


 


WBC 


 


RBC 


 


Hgb 


 


Hct 


 


MCV 


 


MCH 


 


MCHC 


 


RDW 


 


Plt Count 


 


Seg Neutrophils % 


 


Sodium  136.3 L


 


Potassium  4.1


 


Chloride  103


 


Carbon Dioxide  27


 


Anion Gap  6


 


BUN  7


 


Creatinine  0.37 L


 


Est GFR ( Amer)  > 60


 


Est GFR (Non-Af Amer) 


 


Glucose  110


 


Calcium  8.8











Impressions: 


                                        





Abdomen/Pelvis CT  07/08/20 13:35


IMPRESSION:  1.6 x 0.8 cm left proximal ureteral stone with moderate left 

hydronephrosis


Bilateral intrarenal staghorn calculi


Complex cyst versus cystic mass left lower pole kidney.  Ultrasound may be 

useful for further characterization


 








Renal Ultrasound  07/09/20 00:00


IMPRESSION:  Queried left kidney inferior pole lesion is poorly characterized 

due to acoustic impedance.  Recommend dedicated renal CT or renal MR imaging for

definitive characterization.


 








Chest X-Ray  07/16/20 21:19


IMPRESSION: Progressive airspace disease left lung base and


adjacent small pleural effusion..


 














Assessment & Plan





- Diagnosis


(1) Sepsis


Qualifiers: 


   Sepsis type: sepsis due to unspecified organism   Sepsis acute organ 

dysfunction status: unspecified   Qualified Code(s): A41.9 - Sepsis, unspecified

organism   


Is this a current diagnosis for this admission?: Yes   


Plan: 


Patient with sepsis syndrome, get septic work-up, blood culture, urine culture, 

chest x-ray, start empiric IV antibiotic Zosyn








(2) Focal pyelonephritis


Is this a current diagnosis for this admission?: Yes   





(3) Sepsis with metabolic encephalopathy


Is this a current diagnosis for this admission?: Yes   





- Time


Time Spent with patient: 35 or more minutes


Level of Care: IMCU


Medications reviewed and adjusted accordingly: Yes


Anticipated discharge: Other

## 2020-07-20 LAB
ADD MANUAL DIFF: NO
ALBUMIN SERPL-MCNC: 2.2 G/DL (ref 3.5–5)
ALP SERPL-CCNC: 79 U/L (ref 38–126)
ANION GAP SERPL CALC-SCNC: 3 MMOL/L (ref 5–19)
ARTERIAL BLOOD FIO2: (no result)
ARTERIAL BLOOD H2CO3: 1.78 MMOL/L (ref 1.05–1.35)
ARTERIAL BLOOD HCO3: 30 MMOL/L (ref 20–24)
ARTERIAL BLOOD PCO2: 59.1 MMHG (ref 35–45)
ARTERIAL BLOOD PH: 7.32 (ref 7.35–7.45)
ARTERIAL BLOOD PO2: 246.6 MMHG (ref 80–100)
ARTERIAL BLOOD TOTAL CO2: 31.8 MMOL/L (ref 21–25)
AST SERPL-CCNC: 19 U/L (ref 14–36)
BASE EXCESS BLDA CALC-SCNC: 3 MMOL/L
BASOPHILS # BLD AUTO: 0.1 10^3/UL (ref 0–0.2)
BASOPHILS NFR BLD AUTO: 0.4 % (ref 0–2)
BILIRUB DIRECT SERPL-MCNC: 0.1 MG/DL (ref 0–0.4)
BILIRUB SERPL-MCNC: 0.4 MG/DL (ref 0.2–1.3)
BUN SERPL-MCNC: 10 MG/DL (ref 7–20)
CALCIUM: 8.6 MG/DL (ref 8.4–10.2)
CHLORIDE SERPL-SCNC: 105 MMOL/L (ref 98–107)
CO2 SERPL-SCNC: 30 MMOL/L (ref 22–30)
EOSINOPHIL # BLD AUTO: 0 10^3/UL (ref 0–0.6)
EOSINOPHIL NFR BLD AUTO: 0.2 % (ref 0–6)
ERYTHROCYTE [DISTWIDTH] IN BLOOD BY AUTOMATED COUNT: 15.9 % (ref 11.5–14)
GLUCOSE SERPL-MCNC: 119 MG/DL (ref 75–110)
HCT VFR BLD CALC: 25.6 % (ref 36–47)
HGB BLD-MCNC: 8.1 G/DL (ref 12–15.5)
LYMPHOCYTES # BLD AUTO: 1 10^3/UL (ref 0.5–4.7)
LYMPHOCYTES NFR BLD AUTO: 8.5 % (ref 13–45)
MCH RBC QN AUTO: 25.1 PG (ref 27–33.4)
MCHC RBC AUTO-ENTMCNC: 31.6 G/DL (ref 32–36)
MCV RBC AUTO: 79 FL (ref 80–97)
MONOCYTES # BLD AUTO: 0.7 10^3/UL (ref 0.1–1.4)
MONOCYTES NFR BLD AUTO: 6.1 % (ref 3–13)
NEUTROPHILS # BLD AUTO: 10.4 10^3/UL (ref 1.7–8.2)
NEUTS SEG NFR BLD AUTO: 84.8 % (ref 42–78)
PLATELET # BLD: 336 10^3/UL (ref 150–450)
POTASSIUM SERPL-SCNC: 4.1 MMOL/L (ref 3.6–5)
PROT SERPL-MCNC: 5.9 G/DL (ref 6.3–8.2)
RBC # BLD AUTO: 3.22 10^6/UL (ref 3.72–5.28)
SAO2 % BLDA: 99.5 % (ref 94–98)
TOTAL CELLS COUNTED % (AUTO): 100 %
WBC # BLD AUTO: 12.2 10^3/UL (ref 4–10.5)

## 2020-07-20 RX ADMIN — FLUCONAZOLE SCH: 100 TABLET ORAL at 17:33

## 2020-07-20 RX ADMIN — NYSTATIN CREAM SCH APPLIC: 100000 CREAM TOPICAL at 17:48

## 2020-07-20 RX ADMIN — PIPERACILLIN AND TAZOBACTAM SCH MLS/HR: 4; .5 INJECTION, POWDER, LYOPHILIZED, FOR SOLUTION INTRAVENOUS; PARENTERAL at 17:48

## 2020-07-20 RX ADMIN — PIPERACILLIN AND TAZOBACTAM SCH MLS/HR: 4; .5 INJECTION, POWDER, LYOPHILIZED, FOR SOLUTION INTRAVENOUS; PARENTERAL at 00:32

## 2020-07-20 RX ADMIN — METOPROLOL TARTRATE SCH: 25 TABLET, FILM COATED ORAL at 17:34

## 2020-07-20 RX ADMIN — OXYCODONE HYDROCHLORIDE AND ACETAMINOPHEN SCH: 500 TABLET ORAL at 09:43

## 2020-07-20 RX ADMIN — PIPERACILLIN AND TAZOBACTAM SCH MLS/HR: 4; .5 INJECTION, POWDER, LYOPHILIZED, FOR SOLUTION INTRAVENOUS; PARENTERAL at 05:31

## 2020-07-20 RX ADMIN — Medication SCH ML: at 13:55

## 2020-07-20 RX ADMIN — Medication SCH: at 09:43

## 2020-07-20 RX ADMIN — Medication SCH: at 21:10

## 2020-07-20 RX ADMIN — NYSTATIN CREAM SCH: 100000 CREAM TOPICAL at 09:42

## 2020-07-20 RX ADMIN — Medication SCH: at 06:53

## 2020-07-20 RX ADMIN — METOPROLOL TARTRATE SCH MG: 25 TABLET, FILM COATED ORAL at 05:32

## 2020-07-20 RX ADMIN — SODIUM CHLORIDE PRN MLS/HR: 9 INJECTION, SOLUTION INTRAVENOUS at 16:23

## 2020-07-20 RX ADMIN — ENOXAPARIN SODIUM SCH: 40 INJECTION SUBCUTANEOUS at 09:41

## 2020-07-20 RX ADMIN — Medication SCH: at 05:33

## 2020-07-20 RX ADMIN — PIPERACILLIN AND TAZOBACTAM SCH MLS/HR: 4; .5 INJECTION, POWDER, LYOPHILIZED, FOR SOLUTION INTRAVENOUS; PARENTERAL at 13:53

## 2020-07-20 RX ADMIN — Medication SCH: at 09:42

## 2020-07-20 NOTE — RADIOLOGY REPORT (SQ)
CHEST 1 VIEW on 7/20/2020 at 12:43 AM 



CLINICAL INDICATION: Low oxygen saturation, shortness of breath



COMPARISON: 7/19/2020



FINDINGS: There remains a small left pleural effusion. There has

been some improvement in the adjacent left lower lung opacity

consistent with improved atelectasis and/or pneumonia. The right

lung is clear. Heart is within normal limits for size. Hilar and

mediastinal contours are within normal limits.



IMPRESSION: Small left pleural effusion with some improvement in

adjacent left basilar atelectasis and/or pneumonia.

## 2020-07-20 NOTE — EKG REPORT
SEVERITY:- ABNORMAL ECG -

SINUS TACHYCARDIA

NONSPECIFIC T ABNORMALITIES, LATERAL LEADS

:

Confirmed by: Ale Price 20-Jul-2020 07:57:47

## 2020-07-20 NOTE — PDOC PROGRESS REPORT
Subjective


Progress Note for:: 07/20/20


Subjective:: 





Patient's hospital stay is further complicated with recurrent episodes of 

aspiration with oxygen desaturation. Last episode was last night with need for 

BiPAP support. Patient denied any fever or chills. No reported chest pain. No 

nausea, vomiting, or abdominal pain. Oral intake remain a challenge. Awaiting 

barium cookie swallow evaluation if it can be done at 90 degree on her hospital 

bed.


Reason For Visit: 


RECURRENT UTI WIHT UROSEPSIS,SACRAL DECUBITUS ULCE








Physical Exam


Vital Signs: 


                                        











Temp Pulse Resp BP Pulse Ox


 


 97.9 F   103 H  16   123/62   100 


 


 07/20/20 12:02  07/20/20 12:02  07/20/20 12:02  07/20/20 12:02  07/20/20 12:02








                                 Intake & Output











 07/19/20 07/20/20 07/21/20





 06:59 06:59 06:59


 


Intake Total 1337 437 100


 


Output Total 1050 1175 


 


Balance 287 -738 100


 


Weight 94.4 kg 95.3 kg 











Physical Exam: 


General appearance: PRESENT: no acute distress


Head exam: PRESENT: atraumatic, normocephalic


Eye exam: PRESENT: conjunctiva pink.  ABSENT: pallor, scleral icterus


Respiratory exam: PRESENT: decreased breath sounds - at lung bases


Cardiovascular exam: PRESENT: RRR, +S1, +S2.  ABSENT: diastolic murmur, rubs, 

systolic murmur


GI/Abdominal exam: PRESENT: normal bowel sounds, soft.  ABSENT: distended, 

guarding, mass, organomegaly, rebound, tenderness


Extremities exam: ABSENT: pedal edema


Neurological exam: PRESENT: alert, awake, oriented to person, oriented to place,

oriented to time, oriented to situation


Psychiatric exam: ABSENT: agitated, anxious


Skin exam: PRESENT: dry, warm.  ABSENT: intact - bilateral sacral region stage 3

decubitii





Results


Laboratory Results: 


                                        





                                 07/20/20 05:11 





                                 07/20/20 05:11 





                                        











  07/19/20 07/19/20 07/19/20





  14:55 16:01 17:48


 


WBC   


 


RBC   


 


Hgb   


 


Hct   


 


MCV   


 


MCH   


 


MCHC   


 


RDW   


 


Plt Count   


 


Seg Neutrophils %   


 


Carbonic Acid   1.13 


 


HCO3/H2CO3 Ratio   24:1 


 


ABG pH   7.48 H 


 


ABG pCO2   37.6 


 


ABG pO2   95.3 


 


ABG HCO3   27.2 H 


 


ABG O2 Saturation   97.7 


 


ABG Base Excess   3.4 


 


FiO2   3L 


 


Sodium   


 


Potassium   


 


Chloride   


 


Carbon Dioxide   


 


Anion Gap   


 


BUN   


 


Creatinine   


 


Est GFR (African Amer)   


 


Glucose   


 


Lactic Acid  0.5 L   0.6 L


 


Calcium   


 


Total Bilirubin   


 


AST   


 


Alkaline Phosphatase   


 


Total Protein   


 


Albumin   


 


Urine Color   


 


Urine Appearance   


 


Urine pH   


 


Ur Specific Gravity   


 


Urine Protein   


 


Urine Glucose (UA)   


 


Urine Ketones   


 


Urine Blood   


 


Urine RBC (Auto)   














  07/19/20 07/19/20 07/19/20





  18:04 20:35 23:55


 


WBC   


 


RBC   


 


Hgb   


 


Hct   


 


MCV   


 


MCH   


 


MCHC   


 


RDW   


 


Plt Count   


 


Seg Neutrophils %   


 


Carbonic Acid    1.78 H


 


HCO3/H2CO3 Ratio    16:1


 


ABG pH    7.32 L


 


ABG pCO2    59.1 H


 


ABG pO2    246.6 H


 


ABG HCO3    30.0 H


 


ABG O2 Saturation    99.5 H


 


ABG Base Excess    3.0


 


FiO2    100%


 


Sodium   


 


Potassium   


 


Chloride   


 


Carbon Dioxide   


 


Anion Gap   


 


BUN   


 


Creatinine   


 


Est GFR (African Amer)   


 


Glucose   


 


Lactic Acid   1.0 


 


Calcium   


 


Total Bilirubin   


 


AST   


 


Alkaline Phosphatase   


 


Total Protein   


 


Albumin   


 


Urine Color  YELLOW  


 


Urine Appearance  CLOUDY  


 


Urine pH  7.0  


 


Ur Specific [place]  1.011  


 


Urine Protein  NEGATIVE  


 


Urine Glucose (UA)  NEGATIVE  


 


Urine Ketones  20 H  


 


Urine Blood  MODERATE H  


 


Urine RBC (Auto)  34  














  07/20/20 07/20/20





  05:11 05:11


 


WBC  12.2 H 


 


RBC  3.22 L 


 


Hgb  8.1 L 


 


Hct  25.6 L 


 


MCV  79 L 


 


MCH  25.1 L 


 


MCHC  31.6 L 


 


RDW  15.9 H 


 


Plt Count  336 


 


Seg Neutrophils %  84.8 H 


 


Carbonic Acid  


 


HCO3/H2CO3 Ratio  


 


ABG pH  


 


ABG pCO2  


 


ABG pO2  


 


ABG HCO3  


 


ABG O2 Saturation  


 


ABG Base Excess  


 


FiO2  


 


Sodium   138.4


 


Potassium   4.1


 


Chloride   105


 


Carbon Dioxide   30


 


Anion Gap   3 L


 


BUN   10


 


Creatinine   0.42 L


 


Est GFR (African Amer)   > 60


 


Glucose   119 H


 


Lactic Acid  


 


Calcium   8.6


 


Total Bilirubin   0.4


 


AST   19


 


Alkaline Phosphatase   79


 


Total Protein   5.9 L


 


Albumin   2.2 L


 


Urine Color  


 


Urine Appearance  


 


Urine pH  


 


Ur Specific Gravity  


 


Urine Protein  


 


Urine Glucose (UA)  


 


Urine Ketones  


 


Urine Blood  


 


Urine RBC (Auto)  











Impressions: 


                                        





Abdomen/Pelvis CT  07/08/20 13:35


IMPRESSION:  1.6 x 0.8 cm left proximal ureteral stone with moderate left 

hydronephrosis


Bilateral intrarenal staghorn calculi


Complex cyst versus cystic mass left lower pole kidney.  Ultrasound may be 

useful for further characterization


 








Renal Ultrasound  07/09/20 00:00


IMPRESSION:  Queried left kidney inferior pole lesion is poorly characterized 

due to acoustic impedance.  Recommend dedicated renal CT or renal MR imaging for

definitive characterization.


 








Chest X-Ray  07/19/20 00:00


IMPRESSION: Small left pleural effusion with some improvement in


adjacent left basilar atelectasis and/or pneumonia. 


 














Assessment & Plan





- Diagnosis


(1) Recurrent UTI (urinary tract infection)


Is this a current diagnosis for this admission?: Yes   





(2) Sepsis with metabolic encephalopathy


Is this a current diagnosis for this admission?: Yes   





(3) Hypokalemia


Is this a current diagnosis for this admission?: Yes   





(4) Staghorn kidney stones


Is this a current diagnosis for this admission?: Yes   





(5) Hydronephrosis of left kidney


Is this a current diagnosis for this admission?: Yes   





(6) Pressure ulcer of unspecified buttock, stage 3


Qualifiers: 


   Laterality: unspecified laterality   Qualified Code(s): L89.303 - Pressure 

ulcer of unspecified buttock, stage 3   


Is this a current diagnosis for this admission?: Yes   





(7) Multiple sclerosis, primary chronic progressive


Is this a current diagnosis for this admission?: Yes   





(8) Acute respiratory distress


Is this a current diagnosis for this admission?: Yes   





(9) Aspiration into lower respiratory tract


Qualifiers: 


   Encounter type: subsequent encounter   Qualified Code(s): T17.800D - 

Unspecified foreign body in other parts of respiratory tract causing 

asphyxiation, subsequent encounter   


Is this a current diagnosis for this admission?: Yes   





(10) Candida cystitis


Is this a current diagnosis for this admission?: Yes   





(11) Urinary incontinence due to immobility


Is this a current diagnosis for this admission?: Yes   





(12) Yeast dermatitis


Is this a current diagnosis for this admission?: Yes   





- Time


Time Spent with patient: 25-34 minutes


Level of Care: IMCU


Medications reviewed and adjusted accordingly: Yes


Anticipated discharge: Home with Homehealth, SNF


Within: Other





- Inpatient Certification


Based on my medical assessment, after consideration of the patient's 

comorbidities, presenting symptoms, or acuity I expect that the services needed 

warrant INPATIENT care.: Yes


I certify that my determination is in accordance with my understanding of 

Medicare's requirements for reasonable and necessary INPATIENT services [42 CFR 

412.3e].: Yes


Medical Necessity: Significant Comorbidiites Make Outpatient Treatment Too 

Risky, Need Close Monitoring Due to Risk of Patient Decompensation, Need For IV 

Fluids, Need For Continuous Telemetry Monitoring, Need for IV Antibiotics, Risk 

of Complication if Not Cared For in Hospital, Risk of Diagnosis Which Will 

Require Inpatient Eval/Care/Monitoring


Post Hospital Care: D/C Planner Documentation, D/C or Transfer Summary





- Plan Summary


Plan Summary: 





Continue IV Zosyn coverage. Follow up with radiology department id cookie 

swallow can be completed at 90 degree in bed. Maintain on all other current 

management. Repeat CBC with diff and BMP in am.

## 2020-07-21 LAB
ABSOLUTE LYMPHOCYTES# (MANUAL): 1 10^3/UL (ref 0.5–4.7)
ABSOLUTE MONOCYTES # (MANUAL): 0.3 10^3/UL (ref 0.1–1.4)
ADD MANUAL DIFF: NO
ADD MANUAL DIFF: YES
ANION GAP SERPL CALC-SCNC: 6 MMOL/L (ref 5–19)
ANISOCYTOSIS BLD QL SMEAR: (no result)
BASOPHILS # BLD AUTO: 0 10^3/UL (ref 0–0.2)
BASOPHILS NFR BLD AUTO: 0.6 % (ref 0–2)
BASOPHILS NFR BLD MANUAL: 2 % (ref 0–2)
BUN SERPL-MCNC: 7 MG/DL (ref 7–20)
BURR CELLS BLD QL SMEAR: SLIGHT
CALCIUM: 8.6 MG/DL (ref 8.4–10.2)
CHLORIDE SERPL-SCNC: 103 MMOL/L (ref 98–107)
CO2 SERPL-SCNC: 28 MMOL/L (ref 22–30)
EOSINOPHIL # BLD AUTO: 0.2 10^3/UL (ref 0–0.6)
EOSINOPHIL NFR BLD AUTO: 2.3 % (ref 0–6)
EOSINOPHIL NFR BLD MANUAL: 0 % (ref 0–6)
ERYTHROCYTE [DISTWIDTH] IN BLOOD BY AUTOMATED COUNT: 16 % (ref 11.5–14)
ERYTHROCYTE [DISTWIDTH] IN BLOOD BY AUTOMATED COUNT: 16.4 % (ref 11.5–14)
GLUCOSE SERPL-MCNC: 75 MG/DL (ref 75–110)
HCT VFR BLD CALC: 23.1 % (ref 36–47)
HCT VFR BLD CALC: 31.1 % (ref 36–47)
HGB BLD-MCNC: 10.4 G/DL (ref 12–15.5)
HGB BLD-MCNC: 7.5 G/DL (ref 12–15.5)
LYMPHOCYTES # BLD AUTO: 1.3 10^3/UL (ref 0.5–4.7)
LYMPHOCYTES NFR BLD AUTO: 16.6 % (ref 13–45)
MCH RBC QN AUTO: 25.5 PG (ref 27–33.4)
MCH RBC QN AUTO: 27 PG (ref 27–33.4)
MCHC RBC AUTO-ENTMCNC: 32.3 G/DL (ref 32–36)
MCHC RBC AUTO-ENTMCNC: 33.4 G/DL (ref 32–36)
MCV RBC AUTO: 79 FL (ref 80–97)
MCV RBC AUTO: 81 FL (ref 80–97)
MONOCYTES # BLD AUTO: 0.6 10^3/UL (ref 0.1–1.4)
MONOCYTES % (MANUAL): 2 % (ref 3–13)
MONOCYTES NFR BLD AUTO: 7.4 % (ref 3–13)
NEUTROPHILS # BLD AUTO: 5.8 10^3/UL (ref 1.7–8.2)
NEUTS BAND NFR BLD MANUAL: 1 % (ref 3–5)
NEUTS HYPERSEG BLD QL SMEAR: PRESENT
NEUTS SEG NFR BLD AUTO: 73.1 % (ref 42–78)
PLATELET # BLD: 338 10^3/UL (ref 150–450)
PLATELET # BLD: 345 10^3/UL (ref 150–450)
PLATELET COMMENT: ADEQUATE
POLYCHROMASIA BLD QL SMEAR: SLIGHT
POTASSIUM SERPL-SCNC: 3.9 MMOL/L (ref 3.6–5)
RBC # BLD AUTO: 2.93 10^6/UL (ref 3.72–5.28)
RBC # BLD AUTO: 3.86 10^6/UL (ref 3.72–5.28)
SEGMENTED NEUTROPHILS % (MAN): 87 % (ref 42–78)
TOTAL CELLS COUNTED % (AUTO): 100 %
TOTAL CELLS COUNTED BLD: 100
VARIANT LYMPHS NFR BLD MANUAL: 8 % (ref 13–45)
WBC # BLD AUTO: 12.9 10^3/UL (ref 4–10.5)
WBC # BLD AUTO: 7.9 10^3/UL (ref 4–10.5)
WBC TOXIC VACUOLES BLD QL SMEAR: PRESENT

## 2020-07-21 PROCEDURE — 30233N1 TRANSFUSION OF NONAUTOLOGOUS RED BLOOD CELLS INTO PERIPHERAL VEIN, PERCUTANEOUS APPROACH: ICD-10-PCS | Performed by: INTERNAL MEDICINE

## 2020-07-21 RX ADMIN — PIPERACILLIN AND TAZOBACTAM SCH MLS/HR: 4; .5 INJECTION, POWDER, LYOPHILIZED, FOR SOLUTION INTRAVENOUS; PARENTERAL at 01:40

## 2020-07-21 RX ADMIN — FLUCONAZOLE SCH: 100 TABLET ORAL at 17:08

## 2020-07-21 RX ADMIN — NYSTATIN CREAM SCH APPLIC: 100000 CREAM TOPICAL at 09:52

## 2020-07-21 RX ADMIN — Medication SCH ML: at 21:43

## 2020-07-21 RX ADMIN — METOPROLOL TARTRATE SCH: 25 TABLET, FILM COATED ORAL at 17:08

## 2020-07-21 RX ADMIN — METOPROLOL TARTRATE SCH: 25 TABLET, FILM COATED ORAL at 05:46

## 2020-07-21 RX ADMIN — Medication SCH ML: at 05:49

## 2020-07-21 RX ADMIN — PIPERACILLIN AND TAZOBACTAM SCH MLS/HR: 4; .5 INJECTION, POWDER, LYOPHILIZED, FOR SOLUTION INTRAVENOUS; PARENTERAL at 14:08

## 2020-07-21 RX ADMIN — Medication SCH: at 09:42

## 2020-07-21 RX ADMIN — Medication SCH: at 09:43

## 2020-07-21 RX ADMIN — NYSTATIN CREAM SCH: 100000 CREAM TOPICAL at 17:09

## 2020-07-21 RX ADMIN — Medication SCH ML: at 14:12

## 2020-07-21 RX ADMIN — PIPERACILLIN AND TAZOBACTAM SCH MLS/HR: 4; .5 INJECTION, POWDER, LYOPHILIZED, FOR SOLUTION INTRAVENOUS; PARENTERAL at 20:29

## 2020-07-21 RX ADMIN — OXYCODONE HYDROCHLORIDE AND ACETAMINOPHEN SCH: 500 TABLET ORAL at 09:42

## 2020-07-21 RX ADMIN — ENOXAPARIN SODIUM SCH: 40 INJECTION SUBCUTANEOUS at 09:42

## 2020-07-21 RX ADMIN — PIPERACILLIN AND TAZOBACTAM SCH MLS/HR: 4; .5 INJECTION, POWDER, LYOPHILIZED, FOR SOLUTION INTRAVENOUS; PARENTERAL at 05:49

## 2020-07-21 NOTE — PDOC PROGRESS REPORT
Subjective


Progress Note for:: 07/21/20


Subjective:: 





No reported fever or chills. No reported chest pain. No nausea, vomiting, or 

abdominal pain. Oral intake remain a challenge. Her hemoglobin dropped to less 

than 8gm/dl necessitating request for PRBC transfusion.


Reason For Visit: 


RECURRENT UTI WIHT UROSEPSIS,SACRAL DECUBITUS ULCE








Physical Exam


Vital Signs: 


                                        











Temp Pulse Resp BP Pulse Ox


 


 98.2 F   90   14   124/60   100 


 


 07/21/20 10:54  07/21/20 11:13  07/21/20 11:13  07/21/20 10:54  07/21/20 11:13








                                 Intake & Output











 07/20/20 07/21/20 07/22/20





 06:59 06:59 06:59


 


Intake Total 437 1400 0


 


Output Total 1175 1405 


 


Balance -738 -5 0


 


Weight 95.3 kg 93.6 kg 











Physical Exam: 





General appearance: PRESENT: no acute distress


Head exam: PRESENT: atraumatic, normocephalic


Eye exam: PRESENT: conjunctiva pink.  ABSENT: pallor, scleral icterus


Respiratory exam: PRESENT: decreased breath sounds - at lung bases


Cardiovascular exam: PRESENT: RRR, +S1, +S2.  ABSENT: diastolic murmur, rubs, 

systolic murmur


GI/Abdominal exam: PRESENT: normal bowel sounds, soft.  ABSENT: distended, gua

rding, mass, organomegaly, rebound, tenderness


Extremities exam: ABSENT: pedal edema


Neurological exam: PRESENT: alert, awake, oriented to person, oriented to place,

oriented to time, oriented to situation


Psychiatric exam: ABSENT: agitated, anxious


Skin exam: PRESENT: dry, warm.  ABSENT: intact - bilateral sacral region stage 3

decubitii





Results


Laboratory Results: 


                                        





                                 07/21/20 05:47 





                                 07/21/20 05:47 





                                        











  07/21/20 07/21/20 07/21/20





  05:47 05:47 07:20


 


WBC  7.9  


 


RBC  2.93 L  


 


Hgb  7.5 L  


 


Hct  23.1 L  


 


MCV  79 L  


 


MCH  25.5 L  


 


MCHC  32.3  


 


RDW  16.4 H  


 


Plt Count  338  


 


Seg Neutrophils %  73.1  


 


Sodium   137.1 


 


Potassium   3.9 


 


Chloride   103 


 


Carbon Dioxide   28 


 


Anion Gap   6 


 


BUN   7 


 


Creatinine   0.48 L 


 


Est GFR ( Amer)   > 60 


 


Glucose   75 


 


Calcium   8.6 


 


Blood Type    A POSITIVE


 


Antibody Screen    NEGATIVE








                                        





07/19/20 18:04   Catheterized Urine   Urine Culture - Final


                            Pseudomonas Aeruginosa








Impressions: 


                                        





Abdomen/Pelvis CT  07/08/20 13:35


IMPRESSION:  1.6 x 0.8 cm left proximal ureteral stone with moderate left hy

dronephrosis


Bilateral intrarenal staghorn calculi


Complex cyst versus cystic mass left lower pole kidney.  Ultrasound may be 

useful for further characterization


 








Renal Ultrasound  07/09/20 00:00


IMPRESSION:  Queried left kidney inferior pole lesion is poorly characterized 

due to acoustic impedance.  Recommend dedicated renal CT or renal MR imaging for

definitive characterization.


 








Chest X-Ray  07/19/20 00:00


IMPRESSION: Small left pleural effusion with some improvement in


adjacent left basilar atelectasis and/or pneumonia. 


 














Assessment & Plan





- Diagnosis


(1) Recurrent UTI (urinary tract infection)


Is this a current diagnosis for this admission?: Yes   





(2) Sepsis with metabolic encephalopathy


Is this a current diagnosis for this admission?: Yes   





(3) Hypokalemia


Is this a current diagnosis for this admission?: Yes   





(4) Staghorn kidney stones


Is this a current diagnosis for this admission?: Yes   





(5) Hydronephrosis of left kidney


Is this a current diagnosis for this admission?: Yes   





(6) Pressure ulcer of unspecified buttock, stage 3


Qualifiers: 


   Laterality: unspecified laterality   Qualified Code(s): L89.303 - Pressure 

ulcer of unspecified buttock, stage 3   


Is this a current diagnosis for this admission?: Yes   





(7) Multiple sclerosis, primary chronic progressive


Is this a current diagnosis for this admission?: Yes   





(8) Acute respiratory distress


Is this a current diagnosis for this admission?: Yes   





(9) Aspiration into lower respiratory tract


Qualifiers: 


   Encounter type: subsequent encounter   Qualified Code(s): T17.800D - Unspeci

fied foreign body in other parts of respiratory tract causing asphyxiation, 

subsequent encounter   


Is this a current diagnosis for this admission?: Yes   





(10) Candida cystitis


Is this a current diagnosis for this admission?: Yes   





(11) Urinary incontinence due to immobility


Is this a current diagnosis for this admission?: Yes   





(12) Yeast dermatitis


Is this a current diagnosis for this admission?: Yes   





(13) Anemia requiring transfusions


Is this a current diagnosis for this admission?: Yes   


Plan: 


Type and cross with transfusion 2 units PRBC. Obtain stool occult blood test. 

May need endoscopic evaluation and colonoscopy if cause of anemia is not clear.








(14) Cystitis due to Pseudomonas


Is this a current diagnosis for this admission?: Yes   


Plan: 


Continue IV Zosyn coverage. Her elevated procalcitonin level is very indicative 

of high risk for sepsis.








- Time


Time Spent with patient: 25-34 minutes


Level of Care: IMCU


Medications reviewed and adjusted accordingly: Yes


Anticipated discharge: Home with Homehealth


Within: Other





- Inpatient Certification


Based on my medical assessment, after consideration of the patient's 

comorbidities, presenting symptoms, or acuity I expect that the services needed 

warrant INPATIENT care.: Yes


I certify that my determination is in accordance with my understanding of 

Medicare's requirements for reasonable and necessary INPATIENT services [42 CFR 

412.3e].: Yes


Medical Necessity: Significant Comorbidiites Make Outpatient Treatment Too 

Risky, Need Close Monitoring Due to Risk of Patient Decompensation, Need For IV 

Fluids, Need For Continuous Telemetry Monitoring, Need for IV Antibiotics, Risk 

of Complication if Not Cared For in Hospital, Risk of Diagnosis Which Will 

Require Inpatient Eval/Care/Monitoring


Post Hospital Care: D/C Planner Documentation





- Plan Summary


Plan Summary: 





Continue current medication management. Follow up on post transfusion CBC 

findings.

## 2020-07-22 RX ADMIN — METOPROLOL TARTRATE SCH MG: 25 TABLET, FILM COATED ORAL at 17:32

## 2020-07-22 RX ADMIN — Medication SCH: at 09:13

## 2020-07-22 RX ADMIN — PIPERACILLIN AND TAZOBACTAM SCH MLS/HR: 4; .5 INJECTION, POWDER, LYOPHILIZED, FOR SOLUTION INTRAVENOUS; PARENTERAL at 02:48

## 2020-07-22 RX ADMIN — ENOXAPARIN SODIUM SCH: 40 INJECTION SUBCUTANEOUS at 09:20

## 2020-07-22 RX ADMIN — Medication SCH: at 09:12

## 2020-07-22 RX ADMIN — PIPERACILLIN AND TAZOBACTAM SCH MLS/HR: 4; .5 INJECTION, POWDER, LYOPHILIZED, FOR SOLUTION INTRAVENOUS; PARENTERAL at 15:24

## 2020-07-22 RX ADMIN — Medication SCH: at 13:12

## 2020-07-22 RX ADMIN — METOPROLOL TARTRATE SCH: 25 TABLET, FILM COATED ORAL at 05:32

## 2020-07-22 RX ADMIN — SODIUM CHLORIDE PRN MLS/HR: 9 INJECTION, SOLUTION INTRAVENOUS at 07:52

## 2020-07-22 RX ADMIN — NYSTATIN CREAM SCH APPLIC: 100000 CREAM TOPICAL at 09:18

## 2020-07-22 RX ADMIN — Medication SCH ML: at 05:49

## 2020-07-22 RX ADMIN — FLUCONAZOLE SCH MG: 100 TABLET ORAL at 17:32

## 2020-07-22 RX ADMIN — PIPERACILLIN AND TAZOBACTAM SCH MLS/HR: 4; .5 INJECTION, POWDER, LYOPHILIZED, FOR SOLUTION INTRAVENOUS; PARENTERAL at 09:18

## 2020-07-22 RX ADMIN — OXYCODONE HYDROCHLORIDE AND ACETAMINOPHEN SCH: 500 TABLET ORAL at 09:12

## 2020-07-22 RX ADMIN — NYSTATIN CREAM SCH APPLIC: 100000 CREAM TOPICAL at 17:32

## 2020-07-22 RX ADMIN — Medication SCH ML: at 22:17

## 2020-07-22 RX ADMIN — PIPERACILLIN AND TAZOBACTAM SCH MLS/HR: 4; .5 INJECTION, POWDER, LYOPHILIZED, FOR SOLUTION INTRAVENOUS; PARENTERAL at 22:16

## 2020-07-22 NOTE — PDOC PROGRESS REPORT
Subjective


Progress Note for:: 07/22/20


Subjective:: 





Patient was able to participate in MBSS and found tolerant of mechanical soft 

diet with full aspiration precautions at feeding time. She tolerated lunch and 

dinner as per nursing staff today. No nausea, vomiting, or abdominal pain. No 

reported fever or chills. No reported chest pain or difficulty with breathing so

far today.


Reason For Visit: 


RECURRENT UTI WIHT UROSEPSIS,SACRAL DECUBITUS ULCE








Physical Exam


Vital Signs: 


                                        











Temp Pulse Resp BP Pulse Ox


 


 97.9 F   89   16   132/69 H  99 


 


 07/22/20 15:51  07/22/20 16:36  07/22/20 16:36  07/22/20 15:51  07/22/20 16:36








                                 Intake & Output











 07/21/20 07/22/20 07/23/20





 06:59 06:59 06:59


 


Intake Total 1500 2050 699


 


Output Total 1405 725 50


 


Balance 95 1325 649


 


Weight 93.6 kg 94.8 kg 











Physical Exam: 





General appearance: PRESENT: no acute distress


Head exam: PRESENT: atraumatic, normocephalic


Eye exam: PRESENT: conjunctiva pink.  ABSENT: pallor, scleral icterus


Respiratory exam: PRESENT: decreased breath sounds - at lung bases


Cardiovascular exam: PRESENT: RRR, +S1, +S2.  ABSENT: diastolic murmur, rubs, 

systolic murmur


GI/Abdominal exam: PRESENT: normal bowel sounds, soft.  ABSENT: distended, 

guarding, mass, organomegaly, rebound, tenderness


Extremities exam: ABSENT: pedal edema


Neurological exam: PRESENT: alert, awake, oriented to person, oriented to place,

oriented to time, oriented to situation


Psychiatric exam: ABSENT: agitated, anxious


Skin exam: PRESENT: dry, warm.  ABSENT: intact - bilateral sacral region stage 3

decubitii








Results


Laboratory Results: 


                                        





                                 07/21/20 20:17 





                                 07/21/20 05:47 





                                        











  07/21/20





  20:17


 


WBC  12.9 H


 


RBC  3.86


 


Hgb  10.4 L D


 


Hct  31.1 L


 


MCV  81


 


MCH  27.0


 


MCHC  33.4


 


RDW  16.0 H


 


Plt Count  345


 


Seg Neutrophils %  Not Reportable











Impressions: 


                                        





Abdomen/Pelvis CT  07/08/20 13:35


IMPRESSION:  1.6 x 0.8 cm left proximal ureteral stone with moderate left 

hydronephrosis


Bilateral intrarenal staghorn calculi


Complex cyst versus cystic mass left lower pole kidney.  Ultrasound may be 

useful for further characterization


 








Renal Ultrasound  07/09/20 00:00


IMPRESSION:  Queried left kidney inferior pole lesion is poorly characterized 

due to acoustic impedance.  Recommend dedicated renal CT or renal MR imaging for

definitive characterization.


 








Chest X-Ray  07/19/20 00:00


IMPRESSION: Small left pleural effusion with some improvement in


adjacent left basilar atelectasis and/or pneumonia. 


 








Modified Barium Swallow  07/22/20 00:00


IMPRESSION:  LARYNGEAL PENETRATION, WITHOUT ASPIRATION, SEEN WITH THIN BARIUM.  

. PLEASE SEE SPEECH PATHOLOGIST REPORT FOR OTHER FINDINGS AND RECOMMENDATIONS.


 














Assessment & Plan





- Diagnosis


(1) Recurrent UTI (urinary tract infection)


Is this a current diagnosis for this admission?: Yes   





(2) Sepsis with metabolic encephalopathy


Is this a current diagnosis for this admission?: Yes   





(3) Hypokalemia


Is this a current diagnosis for this admission?: Yes   





(4) Staghorn kidney stones


Is this a current diagnosis for this admission?: Yes   





(5) Hydronephrosis of left kidney


Is this a current diagnosis for this admission?: Yes   





(6) Pressure ulcer of unspecified buttock, stage 3


Qualifiers: 


   Laterality: unspecified laterality   Qualified Code(s): L89.303 - Pressure 

ulcer of unspecified buttock, stage 3   


Is this a current diagnosis for this admission?: Yes   





(7) Multiple sclerosis, primary chronic progressive


Is this a current diagnosis for this admission?: Yes   





(8) Acute respiratory distress


Is this a current diagnosis for this admission?: Yes   





(9) Aspiration into lower respiratory tract


Qualifiers: 


   Encounter type: subsequent encounter   Qualified Code(s): T17.800D - 

Unspecified foreign body in other parts of respiratory tract causing 

asphyxiation, subsequent encounter   


Is this a current diagnosis for this admission?: Yes   





(10) Candida cystitis


Is this a current diagnosis for this admission?: Yes   





(11) Urinary incontinence due to immobility


Is this a current diagnosis for this admission?: Yes   





(12) Yeast dermatitis


Is this a current diagnosis for this admission?: Yes   





(13) Anemia requiring transfusions


Is this a current diagnosis for this admission?: Yes   





(14) Cystitis due to Pseudomonas


Is this a current diagnosis for this admission?: Yes   





- Time


Time Spent with patient: 25-34 minutes


Level of Care: IMCU


Medications reviewed and adjusted accordingly: Yes


Anticipated discharge: Home with Homehealth


Within: Other





- Inpatient Certification


Based on my medical assessment, after consideration of the patient's comorbidi

ties, presenting symptoms, or acuity I expect that the services needed warrant 

INPATIENT care.: Yes


I certify that my determination is in accordance with my understanding of [employer]'s requirements for reasonable and necessary INPATIENT services [42 CFR 

412.3e].: Yes


Medical Necessity: Significant Comorbidiites Make Outpatient Treatment Too 

Risky, Need Close Monitoring Due to Risk of Patient Decompensation, Need For IV 

Fluids, Need For Continuous Telemetry Monitoring, Need for IV Antibiotics, Risk 

of Complication if Not Cared For in Hospital, Risk of Diagnosis Which Will 

Require Inpatient Eval/Care/Monitoring


Post Hospital Care: D/C Planner Documentation





- Plan Summary


Plan Summary: 





Continue current medication management. If she continue to improve probably disc

harge home with home health agency services.

## 2020-07-22 NOTE — ST INP MODIFIED BARIUM SWALLOW
Medical Diagnosis





- Medical Diagnoses


Medical Diagnosis Description & ICD-10 Code(s): dysphagia, R13.10





- ICD-10 Tx Diagnosis Coding


(1) Acute respiratory distress


ICD-10 Code(s): R06.03 - ACUTE RESPIRATORY DISTRESS   





(2) Aspiration into lower respiratory tract


ICD-10 Code(s): T17.800A - UNSP FOREIGN BODY IN OTH PRT RESP TRACT CAUSING 

ASPHYX, INIT   





ST Inpatient MBS





- General


Date: 07/22/20


Date of Onset: 07/12/20





- History


-: Medical - per EMR: Patient admitted 7/8/2020 with low grade fever, 

demonstrable confusion, tachycardia, with CT revealing significant leukocytosis.

CRITICAL CARE Progress note 7/12/2020 indicates aspiration into lower 

respiratory tract with suspicion of low level of chronic aspiration (notes 

speech evaluation ordered however order not entered until 7/13), acute 

respiratory distress, acute respiratory distress, hydronephronosis left kidney, 

recurrent UTI, staghorn kidney stones, diabetes mellitus type 2, 

hypoalbuminemia.


Medications: Medications Reviewed


Allergies: Refer to medical record





- Subjective


Current Nutritional Means: NPO - pending study, was on thin liquids and 

mechanical soft cut meats


Current Symptoms: other - respiratory distress, high risk due to underlying 

medical condition (MS)





- Objective


Assessment: Upright - of note, positioning patient at 90 degrees is difficult 

due to patient discomfort. Patient was positioned at 90 degrees for study, 

however, is rarely positioned at 90 degrees in her room. The patient also has 

poor head control. Study started with patient's head upright, by end of study, 

head was very forward., Left Lateral





- Food Trials


Food Trials Used: Thin liquids, Nectar thick liquids, Pureed, Regular


The Patient: fed by ST





- Assessment


Labial Function: Within Normal Limits


Lingual Function: Within Normal Limits


Mandibular Function: Within Normal Limits


Dentition: Partial


Velo-Pharyngeal Function: Unremarkable


Laryngeal Function: Weak Cough, weak voicing





- Pharyngeal Stage


Initiation of Pharyngeal Stage: Normal


Decreased Laryngeal Elevation: Yes - mild


Reduced Velo-Pharyngeal Closure: no


Reduced Pressure Generation: Yes - mild


Reduced Tongue Base Retraction: No


Pre-Swallowing Pooling in Valleculae: Mild


Pre-Swallowing Pooling in Pyriforms: None


Reduced Thyro-Hyiod Approximation: Yes


Reduced Epiglottic Excursion: No


Reduced Pharyngeal Peristalsis: No


Multiple Swallows With: Cleared w/ Liquid Assist


Post Swallow Residuals in Valleculae: None


Post Swallow Residuals in Pyriforms: Mild





- Esophageal Stage


Esophageal Stage Comments: some reduced movement of solids through UES noted, 

mild





- Impression/Summary


Laryngeal Penetration: Yes - patient demonstrated deep penetration of thin 

liquids with straw sips of thin liquid, no cough or clear reaction seen. Cued 

patient to cough, however, cough is very weak and unable to clear penetrated 

material. With cup sips, shallow penetration seen which did not remain in 

laryngeal vestibule, similar to performance with nectar liquids.


Tracheal Aspiration: no - no overt aspiration seen on this study


Effective Compensatory Strategies: chin down


Ineffective Compensatory Strategies: throat clear & reswallow


Patient Presents With: Pharyngeal stage dysph., Mild-Moderate


Risk of Aspiration: Moderate





- Recommendations


Solid Diet Recommendations: Mechanical Soft, Chopped Meat


Liquid Diet Recommendations: Thin


Strict Aspitarion Precautions: Yes - discussed with RN


Recommended Techniques: Fully Upright During Meal


Supervision: requires assistance


Other Recommendations: Patient remains at risk of aspiration due to underlying 

weakness and difficulty with positioning. When patient is fully upright, not 

using straws, and has reduced rate of feeding, risk is significantly decreased, 

but not eliminated. Diet recommendation is for thin liquids, mechanical soft 

solids with cut meats. Recommend strict standard aspiration precautions, to 

include fully upright for all PO, no straws, and frequent oral care. Discussed 

recommendations with RN and patient. RN to contact physician with 

recommendations.





- Time


Total Time: 30


Total Timed Minutes: 30

## 2020-07-22 NOTE — RADIOLOGY REPORT (SQ)
EXAM DESCRIPTION:  COOKIE SWALLOW



IMAGES COMPLETED DATE/TIME:  7/22/2020 8:56 am



REASON FOR STUDY:  Recurrent aspiration, pneumonia



COMPARISON:  None.



TECHNIQUE:  Videofluoroscopic swallowing examination was performed in conjunction with speech patholo
gy.  Videofluoroscopic imaging was obtained and reviewed and these are the findings:



RADIATION DOSE:  Fluoro time 3.57 minutes

1 images saved to PACS.



LIMITATIONS:  None



FINDINGS:  The patient was brought into the fluoro room and placed upright on a modified barium swall
ow chair.  The patient was then given multiple consistencies mixed with barium to swallow under live 
fluoroscopic video guidance.  According to the Speech Pathologist there was laryngeal penetration see
n with thin barium.  All other consistencies were swallowed without incident.  No definite aspiration
 identified.. Please refer to the speech pathology report for further details.



IMPRESSION:  LARYNGEAL PENETRATION, WITHOUT ASPIRATION, SEEN WITH THIN BARIUM.  . PLEASE SEE SPEECH P
ATHOLOGIST REPORT FOR OTHER FINDINGS AND RECOMMENDATIONS.



COMMENT:  None

Quality :  Final reports for procedures using fluoroscopy that document radiation exposure alvaro
michelle, or exposure time and number of fluorographic images (if radiation exposure indices are not avail
able)



TECHNICAL DOCUMENTATION:  JOB ID: [id number]

 2011 [employer]- All Rights Reserved



Reading location - IP/workstation name: [email or web address] [id number]

## 2020-07-23 LAB
ABSOLUTE LYMPHOCYTES# (MANUAL): 0.3 10^3/UL (ref 0.5–4.7)
ABSOLUTE MONOCYTES # (MANUAL): 0.3 10^3/UL (ref 0.1–1.4)
ADD MANUAL DIFF: YES
ANION GAP SERPL CALC-SCNC: 6 MMOL/L (ref 5–19)
ANISOCYTOSIS BLD QL SMEAR: (no result)
BASOPHILS NFR BLD MANUAL: 0 % (ref 0–2)
BUN SERPL-MCNC: 7 MG/DL (ref 7–20)
CALCIUM: 8.9 MG/DL (ref 8.4–10.2)
CHLORIDE SERPL-SCNC: 103 MMOL/L (ref 98–107)
CO2 SERPL-SCNC: 29 MMOL/L (ref 22–30)
EOSINOPHIL NFR BLD MANUAL: 3 % (ref 0–6)
ERYTHROCYTE [DISTWIDTH] IN BLOOD BY AUTOMATED COUNT: 16.9 % (ref 11.5–14)
GLUCOSE SERPL-MCNC: 88 MG/DL (ref 75–110)
HCT VFR BLD CALC: 33.8 % (ref 36–47)
HGB BLD-MCNC: 11.2 G/DL (ref 12–15.5)
MCH RBC QN AUTO: 26.9 PG (ref 27–33.4)
MCHC RBC AUTO-ENTMCNC: 33.1 G/DL (ref 32–36)
MCV RBC AUTO: 81 FL (ref 80–97)
MONOCYTES % (MANUAL): 4 % (ref 3–13)
PLATELET # BLD: 333 10^3/UL (ref 150–450)
PLATELET COMMENT: ADEQUATE
POTASSIUM SERPL-SCNC: 3.9 MMOL/L (ref 3.6–5)
RBC # BLD AUTO: 4.16 10^6/UL (ref 3.72–5.28)
SEGMENTED NEUTROPHILS % (MAN): 89 % (ref 42–78)
TOTAL CELLS COUNTED BLD: 100
VARIANT LYMPHS NFR BLD MANUAL: 4 % (ref 13–45)
WBC # BLD AUTO: 8.1 10^3/UL (ref 4–10.5)

## 2020-07-23 RX ADMIN — PIPERACILLIN AND TAZOBACTAM SCH MLS/HR: 4; .5 INJECTION, POWDER, LYOPHILIZED, FOR SOLUTION INTRAVENOUS; PARENTERAL at 03:24

## 2020-07-23 RX ADMIN — LEVOFLOXACIN SCH MG: 250 TABLET, FILM COATED ORAL at 09:52

## 2020-07-23 RX ADMIN — Medication SCH MG: at 09:50

## 2020-07-23 RX ADMIN — FLUCONAZOLE SCH MG: 100 TABLET ORAL at 17:15

## 2020-07-23 RX ADMIN — OXYCODONE HYDROCHLORIDE AND ACETAMINOPHEN SCH MG: 500 TABLET ORAL at 09:50

## 2020-07-23 RX ADMIN — NYSTATIN CREAM SCH APPLIC: 100000 CREAM TOPICAL at 09:51

## 2020-07-23 RX ADMIN — METOPROLOL TARTRATE SCH MG: 25 TABLET, FILM COATED ORAL at 06:02

## 2020-07-23 RX ADMIN — Medication SCH UNIT: at 09:50

## 2020-07-23 RX ADMIN — Medication SCH: at 13:02

## 2020-07-23 RX ADMIN — Medication SCH ML: at 06:02

## 2020-07-23 RX ADMIN — NYSTATIN CREAM SCH APPLIC: 100000 CREAM TOPICAL at 17:15

## 2020-07-23 RX ADMIN — SODIUM CHLORIDE PRN MLS/HR: 9 INJECTION, SOLUTION INTRAVENOUS at 17:16

## 2020-07-23 RX ADMIN — METOPROLOL TARTRATE SCH MG: 25 TABLET, FILM COATED ORAL at 17:15

## 2020-07-23 RX ADMIN — ENOXAPARIN SODIUM SCH: 40 INJECTION SUBCUTANEOUS at 09:51

## 2020-07-23 NOTE — PDOC PROGRESS REPORT
Subjective


Progress Note for:: 07/23/20


Subjective:: 





Patient denied any chest pain or difficulty with breathing. Tolerating oral 

feeding with full aspiration precautions. No nausea, vomiting, or abdominal 

pain. No reported fever or chills.


Reason For Visit: 


RECURRENT UTI WIHT UROSEPSIS,SACRAL DECUBITUS ULCE








Physical Exam


Vital Signs: 


                                        











Temp Pulse Resp BP Pulse Ox


 


 98.6 F   102 H  20   131/70 H  99 


 


 07/23/20 07:09  07/23/20 07:09  07/23/20 07:09  07/23/20 07:09  07/23/20 07:09








                                 Intake & Output











 07/22/20 07/23/20 07/24/20





 06:59 06:59 06:59


 


Intake Total 2050 1099 


 


Output Total 725 475 


 


Balance 1325 624 


 


Weight 94.8 kg 92.3 kg 











Physical Exam: 


General appearance: PRESENT: no acute distress


Head exam: PRESENT: atraumatic, normocephalic


Eye exam: PRESENT: conjunctiva pink.  ABSENT: pallor, scleral icterus


Respiratory exam: PRESENT: decreased breath sounds - at lung bases


Cardiovascular exam: PRESENT: RRR, +S1, +S2.  ABSENT: diastolic murmur, rubs, 

systolic murmur


GI/Abdominal exam: PRESENT: normal bowel sounds, soft.  ABSENT: distended, 

guarding, mass, organomegaly, rebound, tenderness


Extremities exam: ABSENT: pedal edema


Neurological exam: PRESENT: alert, awake, oriented to person, oriented to place,

oriented to time, oriented to situation


Psychiatric exam: ABSENT: agitated, anxious


Skin exam: PRESENT: dry, warm.  ABSENT: intact - bilateral sacral region stage 3

decubitii





Results


Laboratory Results: 


                                        





                                 07/23/20 05:24 





                                 07/23/20 05:24 





                                        











  07/23/20 07/23/20





  05:24 05:24


 


WBC  8.1 


 


RBC  4.16 


 


Hgb  11.2 L 


 


Hct  33.8 L 


 


MCV  81 


 


MCH  26.9 L 


 


MCHC  33.1 


 


RDW  16.9 H 


 


Plt Count  333 


 


Seg Neutrophils %  Not Reportable 


 


Sodium   138.1


 


Potassium   3.9


 


Chloride   103


 


Carbon Dioxide   29


 


Anion Gap   6


 


BUN   7


 


Creatinine   0.51 L


 


Est GFR (African Amer)   > 60


 


Glucose   88


 


Calcium   8.9











Impressions: 


                                        





Abdomen/Pelvis CT  07/08/20 13:35


IMPRESSION:  1.6 x 0.8 cm left proximal ureteral stone with moderate left 

hydronephrosis


Bilateral intrarenal staghorn calculi


Complex cyst versus cystic mass left lower pole kidney.  Ultrasound may be 

useful for further characterization


 








Renal Ultrasound  07/09/20 00:00


IMPRESSION:  Queried left kidney inferior pole lesion is poorly characterized 

due to acoustic impedance.  Recommend dedicated renal CT or renal MR imaging for

definitive characterization.


 








Chest X-Ray  07/19/20 00:00


IMPRESSION: Small left pleural effusion with some improvement in


adjacent left basilar atelectasis and/or pneumonia. 


 








Modified Barium Swallow  07/22/20 00:00


IMPRESSION:  LARYNGEAL PENETRATION, WITHOUT ASPIRATION, SEEN WITH THIN BARIUM.  

. PLEASE SEE SPEECH PATHOLOGIST REPORT FOR OTHER FINDINGS AND RECOMMENDATIONS.


 














Assessment & Plan





- Diagnosis


(1) Recurrent UTI (urinary tract infection)


Is this a current diagnosis for this admission?: Yes   





(2) Sepsis with metabolic encephalopathy


Is this a current diagnosis for this admission?: Yes   





(3) Hypokalemia


Is this a current diagnosis for this admission?: Yes   





(4) Staghorn kidney stones


Is this a current diagnosis for this admission?: Yes   





(5) Hydronephrosis of left kidney


Is this a current diagnosis for this admission?: Yes   





(6) Pressure ulcer of unspecified buttock, stage 3


Qualifiers: 


   Laterality: unspecified laterality   Qualified Code(s): L89.303 - Pressure 

ulcer of unspecified buttock, stage 3   


Is this a current diagnosis for this admission?: Yes   





(7) Multiple sclerosis, primary chronic progressive


Is this a current diagnosis for this admission?: Yes   





(8) Acute respiratory distress


Is this a current diagnosis for this admission?: Yes   





(9) Aspiration into lower respiratory tract


Qualifiers: 


   Encounter type: subsequent encounter   Qualified Code(s): T17.800D - Unspe

cified foreign body in other parts of respiratory tract causing asphyxiation, 

subsequent encounter   


Is this a current diagnosis for this admission?: Yes   





(10) Candida cystitis


Is this a current diagnosis for this admission?: Yes   





(11) Urinary incontinence due to immobility


Is this a current diagnosis for this admission?: Yes   





(12) Yeast dermatitis


Is this a current diagnosis for this admission?: Yes   





(13) Anemia requiring transfusions


Is this a current diagnosis for this admission?: Yes   





(14) Cystitis due to Pseudomonas


Is this a current diagnosis for this admission?: Yes   





- Time


Time Spent with patient: 25-34 minutes


Level of Care: IMCU


Anticipated discharge: Home with Homehealth


Within: Other





- Inpatient Certification


Based on my medical assessment, after consideration of the patient's 

comorbidities, presenting symptoms, or acuity I expect that the services needed 

warrant INPATIENT care.: Yes


I certify that my determination is in accordance with my understanding of 

Medicare's requirements for reasonable and necessary INPATIENT services [42 CFR 

412.3e].: Yes


Medical Necessity: Significant Comorbidiites Make Outpatient Treatment Too 

Risky, Need Close Monitoring Due to Risk of Patient Decompensation, Need For IV 

Fluids, Need For Continuous Telemetry Monitoring, Need for IV Antibiotics, Risk 

of Complication if Not Cared For in Hospital, Risk of Diagnosis Which Will 

Require Inpatient Eval/Care/Monitoring


Post Hospital Care: D/C Planner Documentation





- Plan Summary


Plan Summary: 





Start on oral Levofloxacin therapy and D/C IV Zosyn therapy. Continue all other 

current medication management.

## 2020-07-24 VITALS — DIASTOLIC BLOOD PRESSURE: 50 MMHG | SYSTOLIC BLOOD PRESSURE: 115 MMHG

## 2020-07-24 RX ADMIN — LEVOFLOXACIN SCH MG: 250 TABLET, FILM COATED ORAL at 09:27

## 2020-07-24 RX ADMIN — Medication SCH: at 06:20

## 2020-07-24 RX ADMIN — Medication SCH MG: at 09:27

## 2020-07-24 RX ADMIN — Medication SCH: at 05:40

## 2020-07-24 RX ADMIN — ENOXAPARIN SODIUM SCH: 40 INJECTION SUBCUTANEOUS at 09:28

## 2020-07-24 RX ADMIN — Medication SCH UNIT: at 09:27

## 2020-07-24 RX ADMIN — OXYCODONE HYDROCHLORIDE AND ACETAMINOPHEN SCH MG: 500 TABLET ORAL at 09:27

## 2020-07-24 RX ADMIN — NYSTATIN CREAM SCH APPLIC: 100000 CREAM TOPICAL at 09:28

## 2020-07-24 RX ADMIN — Medication SCH: at 13:00

## 2020-07-24 RX ADMIN — METOPROLOL TARTRATE SCH MG: 25 TABLET, FILM COATED ORAL at 17:14

## 2020-07-24 RX ADMIN — NYSTATIN CREAM SCH APPLIC: 100000 CREAM TOPICAL at 17:15

## 2020-07-24 RX ADMIN — METOPROLOL TARTRATE SCH MG: 25 TABLET, FILM COATED ORAL at 06:19

## 2020-07-24 NOTE — PDOC DISCHARGE SUMMARY
Impression





- Admit/DC Date/PCP


Admission Date/Primary Care Provider: 


  07/08/20 14:39





  MEG MCCARTHY





Discharge Date: 07/24/20





- Discharge Diagnosis


(1) Recurrent UTI (urinary tract infection)


Is this a current diagnosis for this admission?: Yes   





(2) Sepsis with metabolic encephalopathy


Is this a current diagnosis for this admission?: Yes   





(3) Hypokalemia


Is this a current diagnosis for this admission?: Yes   





(4) Staghorn kidney stones


Is this a current diagnosis for this admission?: Yes   





(5) Hydronephrosis of left kidney


Is this a current diagnosis for this admission?: Yes   





(6) Pressure ulcer of unspecified buttock, stage 3


Is this a current diagnosis for this admission?: Yes   





(7) Multiple sclerosis, primary chronic progressive


Is this a current diagnosis for this admission?: Yes   





(8) Acute respiratory distress


Is this a current diagnosis for this admission?: Yes   





(9) Aspiration into lower respiratory tract


Is this a current diagnosis for this admission?: Yes   





(10) Candida cystitis


Is this a current diagnosis for this admission?: Yes   





(11) Urinary incontinence due to immobility


Is this a current diagnosis for this admission?: Yes   





(12) Yeast dermatitis


Is this a current diagnosis for this admission?: Yes   





(13) Anemia requiring transfusions


Is this a current diagnosis for this admission?: Yes   





(14) Cystitis due to Pseudomonas


Is this a current diagnosis for this admission?: Yes   





- Assessment


Summary: 


Patient was admitted for reported demonstrable confusion and tachycardia. Her 

initial ED evaluation was remarkable for laboratory evaluation abnormal 

urinalysis, leukocytosis, CAT scan abdomen and Pelvis confirming bilateral 

staghorn calculi, left proximal urethral stone with moderate left 

hydronephrosis, and complex versus cystic mass in the lower pole of the left 

kidney. There was concern for probable sepsis. Her urine culture grew Yeast for 

which she has been adequately treated with Diflucan. She subsequently grew 

Pseudomonas from Cherry catheter urine sample. Her hospitalization was further 

complicated with recurrent aspiration pneumonitis/pneumonia necessitating brief 

transfer to ICU due to significant hypoxemia. Her modified barium swallow study 

and speech pathologist consultation did indicate safety with swallowing as long 

as full aspiration risk technique is adhere to and patient remain on mechanical 

soft diet. In view of her bilateral stage 3 sacral decubitus ulcers, she will be

discharged home on urinary system management with Cherry catheter. She will 

remain on oral Levofloxacin 250 mg po daily x 5 days for urine culture directed 

antibiotic therapy. She will subsequently start Nitrofurantoin prophylactic 

therapy at 50 mg po qhs. She will be discharge home with reinstated home health 

agency services to include wound and Cherry catheter management. he will be 

consulted as instructed via Telehealth service upon discharge.





- Additional Information


Resuscitation Status: Full Code


Discharge Diet: As Tolerated - with full aspiration precautions.


Discharge Activity: Activity As Tolerated


Referrals: 


MEG MCCARTHY MD [Primary Care Provider] - 07/30/20 10:00 am (Need Tel

eal Consultation for post acute care follow up apointment.)


Prescriptions: 


Levofloxacin [Levaquin 250 mg Tablet] 250 mg PO DAILY #5 tablet


Metoprolol Tartrate [Lopressor 25 mg Tablet] 12.5 mg PO Q12A #60 tablet


Nystatin [Mycostatin Cream 15 gm] 1 applic TP BID #1 tube


Nitrofurantoin Macrocrystal [Nitrofurantoin] 50 mg PO QHS #90 capsule


Home Medications: 








Acetaminophen [Tylenol 325 mg Tablet] 650 mg PO Q6HP PRN 06/29/20 


Baclofen [Baclofen 10 mg Tablet] 10 mg PO Q8HP PRN 06/29/20 


Ascorbic Acid [Vitamin C] 1,000 mg PO DAILY #30 tablet 07/06/20 


Vitamin A [Vitamin A 10,000 Unit Capsule] 10,000 unit PO DAILY #30 capsule 

07/06/20 


Whey Protein Isolate [Beneprotein] 227 gm PO TID #90 powder 07/06/20 


Zinc Sulfate [Zinc-220 Capsule] 220 mg PO DAILY #30 capsule 07/06/20 


Levofloxacin [Levaquin 250 mg Tablet] 250 mg PO DAILY #5 tablet 07/24/20 


Metoprolol Tartrate [Lopressor 25 mg Tablet] 12.5 mg PO Q12A #60 tablet 07/24/20




Nitrofurantoin Macrocrystal [Nitrofurantoin] 50 mg PO QHS #90 capsule 07/24/20 


Nystatin [Mycostatin Cream 15 gm] 1 applic TP BID #1 tube 07/24/20 











History of Present Illiness


History of Present Illness: 


VI PAIGE is a 55 year old female patient known to my practice who 

presented to the ED via EMS for reported low grade fever and questionable heart 

problem. Her initial ED evaluation was significant for demonstrable confusion 

and tachycardia. Her laboratory evaluation revealed significant leukocytosis and

abnormal urinalysis with CAT scan abdomen and Pelvis confirming bilateral 

staghorn calculi, left proximal urethral stone with moderate left 

hydronephrosis, and complex versus cystic mass in the lower pole of the left 

kidney. She was advised hospitalization for further evaluation and management. 

Her morbidities are listed below.





Hospital Course


Hospital Course: 


Patient was admitted for reported demonstrable confusion and tachycardia. Her 

initial ED evaluation was remarkable for laboratory evaluation abnormal 

urinalysis, leukocytosis, CAT scan abdomen and Pelvis confirming bilateral 

staghorn calculi, left proximal urethral stone with moderate left 

hydronephrosis, and complex versus cystic mass in the lower pole of the left 

kidney. There was concern for probable sepsis. Her urine culture grew Yeast for 

which she has been adequately treated with Diflucan. She subsequently grew 

Pseudomonas from Cherry catheter urine sample. Her hospitalization was further 

complicated with recurrent aspiration pneumonitis/pneumonia necessitating brief 

transfer to ICU due to significant hypoxemia. Her modified barium swallow study 

and speech pathologist consultation did indicate safety with swallowing as long 

as full aspiration risk technique is adhere to and patient remain on mechanical 

soft diet. In view of her bilateral stage 3 sacral decubitus ulcers, she will be

discharged home on urinary system management with Cherry catheter. She will 

remain on oral Levofloxacin 250 mg po daily x 5 days for urine culture directed 

antibiotic therapy. She will subsequently start Nitrofurantoin prophylactic 

therapy at 50 mg po qhs. She will be discharge home with reinstated home health 

agency services to include wound and Cherry catheter management. he will be 

consulted as instructed via Telehealth service upon discharge.





Physical Exam


Vital Signs: 


                                        











Temp Pulse Resp BP Pulse Ox


 


 98.0 F   117 H  16   115/50 L  100 


 


 07/24/20 15:16  07/24/20 15:16  07/24/20 15:16  07/24/20 15:16  07/24/20 15:16








                                 Intake & Output











 07/23/20 07/24/20 07/25/20





 06:59 06:59 06:59


 


Intake Total 1099 998 


 


Output Total 475 1200 


 


Balance 624 -202 


 


Weight 92.3 kg 91.6 kg 

















General appearance: PRESENT: no acute distress


Head exam: PRESENT: atraumatic, normocephalic


Eye exam: PRESENT: conjunctiva pink.  ABSENT: pallor, scleral icterus


Respiratory exam: PRESENT: decreased breath sounds - at lung bases


Cardiovascular exam: PRESENT: RRR, +S1, +S2.  ABSENT: diastolic murmur, rubs, 

systolic murmur


GI/Abdominal exam: PRESENT: normal bowel sounds, soft.  ABSENT: distended, 

guarding, mass, organomegaly, rebound, tenderness


Extremities exam: ABSENT: pedal edema


Neurological exam: PRESENT: alert, awake, oriented to person, oriented to place,

oriented to time, oriented to situation


Psychiatric exam: ABSENT: agitated, anxious


Skin exam: PRESENT: dry, warm.  ABSENT: intact - bilateral sacral region stage 3

decubitii











Results


Laboratory Results: 


                                        











WBC  8.1 10^3/uL (4.0-10.5)   07/23/20  05:24    


 


RBC  4.16 10^6/uL (3.72-5.28)   07/23/20  05:24    


 


Hgb  11.2 g/dL (12.0-15.5)  L  07/23/20  05:24    


 


Hct  33.8 % (36.0-47.0)  L  07/23/20  05:24    


 


MCV  81 fl (80-97)   07/23/20  05:24    


 


MCH  26.9 pg (27.0-33.4)  L  07/23/20  05:24    


 


MCHC  33.1 g/dL (32.0-36.0)   07/23/20  05:24    


 


RDW  16.9 % (11.5-14.0)  H  07/23/20  05:24    


 


Plt Count  333 10^3/uL (150-450)   07/23/20  05:24    


 


Lymph % (Auto)  Not Reportable   07/23/20  05:24    


 


Mono % (Auto)  Not Reportable   07/23/20  05:24    


 


Eos % (Auto)  Not Reportable   07/23/20  05:24    


 


Baso % (Auto)  Not Reportable   07/23/20  05:24    


 


Absolute Neuts (auto)  Not Reportable   07/23/20  05:24    


 


Absolute Lymphs (auto)  Not Reportable   07/23/20  05:24    


 


Absolute Monos (auto)  Not Reportable   07/23/20  05:24    


 


Absolute Eos (auto)  Not Reportable   07/23/20  05:24    


 


Absolute Basos (auto)  Not Reportable   07/23/20  05:24    


 


Total Counted  100   07/23/20  05:24    


 


Seg Neutrophils %  Not Reportable   07/23/20  05:24    


 


Seg Neuts % (Manual)  89 % (42-78)  H  07/23/20  05:24    


 


Band Neutrophils %  1 % (3-5)  L  07/21/20  20:17    


 


Lymphocytes % (Manual)  4 % (13-45)  L  07/23/20  05:24    


 


Monocytes % (Manual)  4 % (3-13)   07/23/20  05:24    


 


Eosinophils % (Manual)  3 % (0-6)   07/23/20  05:24    


 


Basophils % (Manual)  0 % (0-2)   07/23/20  05:24    


 


Abs Neuts (Manual)  7.2 10^3/uL (1.7-8.2)   07/23/20  05:24    


 


Abs Lymphs (Manual)  0.3 10^3/uL (0.5-4.7)  L  07/23/20  05:24    


 


Abs Monocytes (Manual)  0.3 10^3/uL (0.1-1.4)   07/23/20  05:24    


 


Absolute Eos (Manual)  0.2 10^3/uL (0.0-0.6)   07/23/20  05:24    


 


Abs Basophils (Manual)  0.0 10^3/uL (0.0-0.2)   07/23/20  05:24    


 


Hypersegmented Neuts  PRESENT   07/21/20  20:17    


 


Toxic Granulation  SLIGHT   07/19/20  11:40    


 


Toxic Vacuolation  PRESENT   07/21/20  20:17    


 


Platelet Estimate  Cancelled   07/19/20  10:27    


 


Clumped Platelets  PRESENT   07/08/20  12:57    


 


Platelet Comment  ADEQUATE   07/23/20  05:24    


 


Polychromasia  SLIGHT   07/21/20  20:17    


 


Anisocytosis  1+   07/23/20  05:24    


 


Microcytosis  SLIGHT   07/19/20  11:40    


 


Lamont Cells  SLIGHT   07/21/20  20:17    


 


PT  14.1 SEC (11.4-15.4)   07/19/20  14:55    


 


INR  1.08   07/19/20  14:55    


 


Carbonic Acid  1.78 mmol/L (1.05-1.35)  H  07/19/20  23:55    


 


HCO3/H2CO3 Ratio  16:1   07/19/20  23:55    


 


ABG pH  7.32  (7.35-7.45)  L  07/19/20  23:55    


 


ABG pCO2  59.1 mmHg (35-45)  H  07/19/20  23:55    


 


ABG pO2  246.6 mmHg ()  H  07/19/20  23:55    


 


ABG HCO3  30.0 mmol/L (20-24)  H  07/19/20  23:55    


 


ABG Total CO2  31.8 mmol/L (21-25)  H  07/19/20  23:55    


 


ABG O2 Saturation  99.5 % (94-98)  H  07/19/20  23:55    


 


ABG Base Excess  3.0 mmol/L  07/19/20  23:55    


 


VBG pH  7.42  (7.30-7.42)   07/08/20  13:30    


 


VBG pCO2  40.6 mmHg (35-63)   07/08/20  13:30    


 


VBG HCO3  25.5 mmol/L (20-32)   07/08/20  13:30    


 


VBG Base Excess  0.9 mmol/L  07/08/20  13:30    


 


FiO2  100%   07/19/20  23:55    


 


Sodium  138.1 mmol/L (137-145)   07/23/20  05:24    


 


Potassium  3.9 mmol/L (3.6-5.0)   07/23/20  05:24    


 


Chloride  103 mmol/L ()   07/23/20  05:24    


 


Carbon Dioxide  29 mmol/L (22-30)   07/23/20  05:24    


 


Anion Gap  6  (5-19)   07/23/20  05:24    


 


BUN  7 mg/dL (7-20)   07/23/20  05:24    


 


Creatinine  0.51 mg/dL (0.52-1.25)  L  07/23/20  05:24    


 


Est GFR ( Amer)  > 60  (>60)   07/23/20  05:24    


 


Est GFR (Non-Af Amer)  Cancelled   07/19/20  10:27    


 


Est GFR (MDRD) Non-Af  > 60  (>60)   07/23/20  05:24    


 


Glucose  88 mg/dL ()   07/23/20  05:24    


 


POC Glucose  113 mg/dL ()  H  07/22/20  17:45    


 


Lactic Acid  1.0 mmol/L (0.7-2.1)   07/19/20  20:35    


 


Calcium  8.9 mg/dL (8.4-10.2)   07/23/20  05:24    


 


Magnesium  2.2 mg/dL (1.6-2.3)   07/10/20  04:57    


 


Total Bilirubin  0.4 mg/dL (0.2-1.3)   07/20/20  05:11    


 


Direct Bilirubin  0.1 mg/dL (0.0-0.4)   07/20/20  05:11    


 


Neonat Total Bilirubin  Not Reportable   07/20/20  05:11    


 


Neonat Direct Bilirubin  Not Reportable   07/20/20  05:11    


 


Neonat Indirect Bili  Not Reportable   07/20/20  05:11    


 


AST  19 U/L (14-36)   07/20/20  05:11    


 


ALT  7 U/L (<35)   07/20/20  05:11    


 


Alkaline Phosphatase  79 U/L ()   07/20/20  05:11    


 


Total Protein  5.9 g/dL (6.3-8.2)  L  07/20/20  05:11    


 


Albumin  2.2 g/dL (3.5-5.0)  L  07/20/20  05:11    


 


EGFR   Cancelled   07/19/20  10:27    


 


Procalcitonin  3.02 ng/mL (0.00-0.08)  H  07/19/20  14:55    


 


Urine Color  YELLOW   07/19/20  18:04    


 


Urine Appearance  CLOUDY   07/19/20  18:04    


 


Urine pH  7.0  (5.0-9.0)   07/19/20  18:04    


 


Ur Specific Gravity  1.011   07/19/20  18:04    


 


Urine Protein  NEGATIVE mg/dL (NEGATIVE)   07/19/20  18:04    


 


Urine Glucose (UA)  NEGATIVE mg/dL (NEGATIVE)   07/19/20  18:04    


 


Urine Ketones  20 mg/dL (NEGATIVE)  H  07/19/20  18:04    


 


Urine Blood  MODERATE  (NEGATIVE)  H  07/19/20  18:04    


 


Urine Nitrite (Reflex)  NEGATIVE  (NEGATIVE)   07/19/20  18:04    


 


Urine Bilirubin  NEGATIVE  (NEGATIVE)   07/19/20  18:04    


 


Urine Urobilinogen  NEGATIVE mg/dL (<2.0)   07/19/20  18:04    


 


Leukocyte Esterase Rfl  LARGE  (NEGATIVE)  H  07/19/20  18:04    


 


Urine RBC (Auto)  34 /HPF  07/19/20  18:04    


 


Urine Bacteria (Auto)  TRACE /HPF  07/19/20  18:04    


 


Urine WBC (Reflex)  > 182 /HPF  07/19/20  18:04    


 


Urine WBC Clumps  MANY /HPF  07/19/20  18:04    


 


Squamous Epi Cells Auto  1 /HPF  07/19/20  18:04    


 


Urine Mucus (Auto)  RARE /LPF  07/19/20  18:04    


 


Urine Yeast (Budding)  PRESENT /HPF  07/08/20  13:08    


 


Urine Ascorbic Acid  20  (NEGATIVE)  H  07/19/20  18:04    


 


Slides for Path Review  Cancelled   07/19/20  10:27    


 


Blood Type  A POSITIVE   07/21/20  07:20    


 


Blood Type Confirm  A POSITIVE   07/21/20  07:31    


 


Antibody Screen  NEGATIVE   07/21/20  07:20    


 


Crossmatch  See Detail   07/21/20  07:20    











Impressions: 


                                        





Chest X-Ray  07/08/20 12:39


IMPRESSION:  NO ACUTE RADIOGRAPHIC FINDING IN THE CHEST.


 








Abdomen/Pelvis CT  07/08/20 13:35


IMPRESSION:  1.6 x 0.8 cm left proximal ureteral stone with moderate left 

hydronephrosis


Bilateral intrarenal staghorn calculi


Complex cyst versus cystic mass left lower pole kidney.  Ultrasound may be 

useful for further characterization


 








Renal Ultrasound  07/09/20 00:00


IMPRESSION:  Queried left kidney inferior pole lesion is poorly characterized 

due to acoustic impedance.  Recommend dedicated renal CT or renal MR imaging for

definitive characterization.


 








Chest X-Ray  07/11/20 00:00


IMPRESSION:  Examination somewhat limited due to patient rotation.  However, 

there appears to be faint airspace opacities involving the left lower lung, 

which may represent developing airspace disease to include clinically suspected 

aspiration.


 








Chest X-Ray  07/16/20 21:19


IMPRESSION: Progressive airspace disease left lung base and


adjacent small pleural effusion..


 








Chest X-Ray  07/19/20 00:00


IMPRESSION:  No significant change.


 








Chest X-Ray  07/19/20 00:00


IMPRESSION: Small left pleural effusion with some improvement in


adjacent left basilar atelectasis and/or pneumonia. 


 








Modified Barium Swallow  07/22/20 00:00


IMPRESSION:  LARYNGEAL PENETRATION, WITHOUT ASPIRATION, SEEN WITH THIN BARIUM.  

. PLEASE SEE SPEECH PATHOLOGIST REPORT FOR OTHER FINDINGS AND RECOMMENDATIONS.


 














Plan


Health Concerns: 


High risk of readmission due to her morbidities. 


Plan of Treatment: 


Close monitoring with wound management and Cherry catheter management with home 

health agency. Maintain on Telehealth consultation follow up.


Goals: 


Reduce readmission risk through close follow up.


Time Spent: Greater than 30 Minutes - Post discharge care coordination.





Stroke


Is this a Stroke Patient?: No





Acute Heart Failure





- **


Is this a Heart Failure Patient?: No

## 2020-07-25 ENCOUNTER — HOSPITAL ENCOUNTER (INPATIENT)
Dept: HOSPITAL 62 - ER | Age: 55
LOS: 11 days | Discharge: HOSPICE | DRG: 853 | End: 2020-08-05
Attending: INTERNAL MEDICINE | Admitting: INTERNAL MEDICINE
Payer: MEDICARE

## 2020-07-25 DIAGNOSIS — G35: ICD-10-CM

## 2020-07-25 DIAGNOSIS — J69.0: ICD-10-CM

## 2020-07-25 DIAGNOSIS — N39.0: ICD-10-CM

## 2020-07-25 DIAGNOSIS — Z66: ICD-10-CM

## 2020-07-25 DIAGNOSIS — N13.30: ICD-10-CM

## 2020-07-25 DIAGNOSIS — Z91.030: ICD-10-CM

## 2020-07-25 DIAGNOSIS — Z74.01: ICD-10-CM

## 2020-07-25 DIAGNOSIS — Z86.711: ICD-10-CM

## 2020-07-25 DIAGNOSIS — R65.20: ICD-10-CM

## 2020-07-25 DIAGNOSIS — E11.9: ICD-10-CM

## 2020-07-25 DIAGNOSIS — Z91.040: ICD-10-CM

## 2020-07-25 DIAGNOSIS — L89.314: ICD-10-CM

## 2020-07-25 DIAGNOSIS — E46: ICD-10-CM

## 2020-07-25 DIAGNOSIS — N20.0: ICD-10-CM

## 2020-07-25 DIAGNOSIS — Z51.5: ICD-10-CM

## 2020-07-25 DIAGNOSIS — L89.324: ICD-10-CM

## 2020-07-25 DIAGNOSIS — D64.9: ICD-10-CM

## 2020-07-25 DIAGNOSIS — Z88.2: ICD-10-CM

## 2020-07-25 DIAGNOSIS — E03.9: ICD-10-CM

## 2020-07-25 DIAGNOSIS — A41.9: Primary | ICD-10-CM

## 2020-07-25 DIAGNOSIS — J96.02: ICD-10-CM

## 2020-07-25 LAB
ADD MANUAL DIFF: NO
ALBUMIN SERPL-MCNC: 3 G/DL (ref 3.5–5)
ALP SERPL-CCNC: 105 U/L (ref 38–126)
ANION GAP SERPL CALC-SCNC: 7 MMOL/L (ref 5–19)
APPEARANCE UR: (no result)
APTT PPP: YELLOW S
ARTERIAL BLOOD H2CO3: 1.4 MMOL/L (ref 1.05–1.35)
ARTERIAL BLOOD HCO3: 26.6 MMOL/L (ref 20–24)
ARTERIAL BLOOD PCO2: 46.5 MMHG (ref 35–45)
ARTERIAL BLOOD PH: 7.38 (ref 7.35–7.45)
ARTERIAL BLOOD PO2: 96.6 MMHG (ref 80–100)
ARTERIAL BLOOD TOTAL CO2: 28 MMOL/L (ref 21–25)
AST SERPL-CCNC: 21 U/L (ref 14–36)
BASE EXCESS BLDA CALC-SCNC: 1 MMOL/L
BASE EXCESS BLDV CALC-SCNC: 0.7 MMOL/L
BASOPHILS # BLD AUTO: 0.1 10^3/UL (ref 0–0.2)
BASOPHILS NFR BLD AUTO: 0.8 % (ref 0–2)
BILIRUB DIRECT SERPL-MCNC: 0.3 MG/DL (ref 0–0.4)
BILIRUB SERPL-MCNC: 0.7 MG/DL (ref 0.2–1.3)
BILIRUB UR QL STRIP: NEGATIVE
BUN SERPL-MCNC: 8 MG/DL (ref 7–20)
CALCIUM: 9.7 MG/DL (ref 8.4–10.2)
CHLORIDE SERPL-SCNC: 103 MMOL/L (ref 98–107)
CK SERPL-CCNC: 30 U/L (ref 30–135)
CO2 SERPL-SCNC: 28 MMOL/L (ref 22–30)
EOSINOPHIL # BLD AUTO: 0.1 10^3/UL (ref 0–0.6)
EOSINOPHIL NFR BLD AUTO: 0.7 % (ref 0–6)
ERYTHROCYTE [DISTWIDTH] IN BLOOD BY AUTOMATED COUNT: 17.6 % (ref 11.5–14)
GLUCOSE SERPL-MCNC: 166 MG/DL (ref 75–110)
GLUCOSE UR STRIP-MCNC: NEGATIVE MG/DL
HCO3 BLDV-SCNC: 29.6 MMOL/L (ref 20–32)
HCT VFR BLD CALC: 36.8 % (ref 36–47)
HGB BLD-MCNC: 12.2 G/DL (ref 12–15.5)
INR PPP: 1.15
KETONES UR STRIP-MCNC: NEGATIVE MG/DL
LYMPHOCYTES # BLD AUTO: 1.8 10^3/UL (ref 0.5–4.7)
LYMPHOCYTES NFR BLD AUTO: 14.5 % (ref 13–45)
MCH RBC QN AUTO: 27.5 PG (ref 27–33.4)
MCHC RBC AUTO-ENTMCNC: 33.2 G/DL (ref 32–36)
MCV RBC AUTO: 83 FL (ref 80–97)
MONOCYTES # BLD AUTO: 0.6 10^3/UL (ref 0.1–1.4)
MONOCYTES NFR BLD AUTO: 4.6 % (ref 3–13)
NEUTROPHILS # BLD AUTO: 9.7 10^3/UL (ref 1.7–8.2)
NEUTS SEG NFR BLD AUTO: 79.4 % (ref 42–78)
NITRITE UR QL STRIP: NEGATIVE
NT PRO BNP: 6640 PG/ML (ref ?–125)
PCO2 BLDV: 68.8 MMHG (ref 35–63)
PH BLDV: 7.25 [PH] (ref 7.3–7.42)
PH UR STRIP: 8 [PH] (ref 5–9)
PLATELET # BLD: 435 10^3/UL (ref 150–450)
POTASSIUM SERPL-SCNC: 4.4 MMOL/L (ref 3.6–5)
PROT SERPL-MCNC: 7.4 G/DL (ref 6.3–8.2)
PROT UR STRIP-MCNC: NEGATIVE MG/DL
PROTHROMBIN TIME: 14.8 SEC (ref 11.4–15.4)
RBC # BLD AUTO: 4.46 10^6/UL (ref 3.72–5.28)
SAO2 % BLDA: 97.2 % (ref 94–98)
SP GR UR STRIP: 1.01
TOTAL CELLS COUNTED % (AUTO): 100 %
TROPONIN I SERPL-MCNC: 0.05 NG/ML
UROBILINOGEN UR-MCNC: 2 MG/DL (ref ?–2)
WBC # BLD AUTO: 12.2 10^3/UL (ref 4–10.5)

## 2020-07-25 PROCEDURE — 85610 PROTHROMBIN TIME: CPT

## 2020-07-25 PROCEDURE — 96361 HYDRATE IV INFUSION ADD-ON: CPT

## 2020-07-25 PROCEDURE — 82803 BLOOD GASES ANY COMBINATION: CPT

## 2020-07-25 PROCEDURE — 99291 CRITICAL CARE FIRST HOUR: CPT

## 2020-07-25 PROCEDURE — 82962 GLUCOSE BLOOD TEST: CPT

## 2020-07-25 PROCEDURE — 87186 SC STD MICRODIL/AGAR DIL: CPT

## 2020-07-25 PROCEDURE — 93010 ELECTROCARDIOGRAM REPORT: CPT

## 2020-07-25 PROCEDURE — S0028 INJECTION, FAMOTIDINE, 20 MG: HCPCS

## 2020-07-25 PROCEDURE — 82565 ASSAY OF CREATININE: CPT

## 2020-07-25 PROCEDURE — 82533 TOTAL CORTISOL: CPT

## 2020-07-25 PROCEDURE — 83880 ASSAY OF NATRIURETIC PEPTIDE: CPT

## 2020-07-25 PROCEDURE — 87077 CULTURE AEROBIC IDENTIFY: CPT

## 2020-07-25 PROCEDURE — 80053 COMPREHEN METABOLIC PANEL: CPT

## 2020-07-25 PROCEDURE — 94660 CPAP INITIATION&MGMT: CPT

## 2020-07-25 PROCEDURE — 83690 ASSAY OF LIPASE: CPT

## 2020-07-25 PROCEDURE — 82550 ASSAY OF CK (CPK): CPT

## 2020-07-25 PROCEDURE — 85025 COMPLETE CBC W/AUTO DIFF WBC: CPT

## 2020-07-25 PROCEDURE — 87086 URINE CULTURE/COLONY COUNT: CPT

## 2020-07-25 PROCEDURE — 83605 ASSAY OF LACTIC ACID: CPT

## 2020-07-25 PROCEDURE — 80202 ASSAY OF VANCOMYCIN: CPT

## 2020-07-25 PROCEDURE — 93005 ELECTROCARDIOGRAM TRACING: CPT

## 2020-07-25 PROCEDURE — 51702 INSERT TEMP BLADDER CATH: CPT

## 2020-07-25 PROCEDURE — 81001 URINALYSIS AUTO W/SCOPE: CPT

## 2020-07-25 PROCEDURE — 36415 COLL VENOUS BLD VENIPUNCTURE: CPT

## 2020-07-25 PROCEDURE — 87040 BLOOD CULTURE FOR BACTERIA: CPT

## 2020-07-25 PROCEDURE — 87150 DNA/RNA AMPLIFIED PROBE: CPT

## 2020-07-25 PROCEDURE — 36600 WITHDRAWAL OF ARTERIAL BLOOD: CPT

## 2020-07-25 PROCEDURE — 96365 THER/PROPH/DIAG IV INF INIT: CPT

## 2020-07-25 PROCEDURE — 96366 THER/PROPH/DIAG IV INF ADDON: CPT

## 2020-07-25 PROCEDURE — 71045 X-RAY EXAM CHEST 1 VIEW: CPT

## 2020-07-25 PROCEDURE — 84484 ASSAY OF TROPONIN QUANT: CPT

## 2020-07-25 RX ADMIN — SODIUM CHLORIDE PRN MLS/HR: 9 INJECTION, SOLUTION INTRAVENOUS at 09:59

## 2020-07-25 RX ADMIN — SODIUM CHLORIDE PRN MLS/HR: 9 INJECTION, SOLUTION INTRAVENOUS at 09:00

## 2020-07-25 RX ADMIN — Medication SCH UNIT: at 22:20

## 2020-07-25 NOTE — RADIOLOGY REPORT (SQ)
EXAM DESCRIPTION:  CHEST SINGLE VIEW



IMAGES COMPLETED DATE/TIME:  7/25/2020 9:21 am



REASON FOR STUDY:  sobr



COMPARISON:  7/20/2020



EXAM PARAMETERS:  NUMBER OF VIEWS: One view.

TECHNIQUE: Single frontal radiographic view of the chest acquired.

RADIATION DOSE: NA

LIMITATIONS: None.



FINDINGS:  LUNGS AND PLEURA: No opacities, masses or pneumothorax. No pleural effusion.

MEDIASTINUM AND HILAR STRUCTURES: No masses.  Contour normal.

HEART AND VASCULAR STRUCTURES: Heart normal in size.  Normal vasculature.

BONES: No acute findings.

HARDWARE: None in the chest.

OTHER: No other significant finding.



IMPRESSION:  NO ACUTE RADIOGRAPHIC FINDING IN THE CHEST.



TECHNICAL DOCUMENTATION:  JOB ID:  [id number]

 2011 [employer]- All Rights Reserved



Reading location - IP/workstation name: [email or web address]

## 2020-07-25 NOTE — RADIOLOGY REPORT (SQ)
EXAM DESCRIPTION:  CHEST SINGLE VIEW



IMAGES COMPLETED DATE/TIME:  7/25/2020 11:35 am



REASON FOR STUDY:  central line placement



COMPARISON:  7/25/2020 0924 hours



NUMBER OF VIEWS:  One view.



TECHNIQUE:  Single frontal radiographic view of the chest acquired.



LIMITATIONS:  None.



FINDINGS:   Central venous access catheter placed via left subclavian approach.  Catheter tip at cavo
-atrial junction.  No pneumothorax. Radiographic appearance of the chest otherwise stable.



IMPRESSION:  CENTRAL VENOUS ACCESS CATHETER IN APPROPRIATE LOCATION.  NO PNEUMOTHORAX.  NEW CENTRAL L
INE.



TECHNICAL DOCUMENTATION:  JOB ID:  [id number]

 2011 [employer]- All Rights Reserved



Reading location - IP/workstation name: [email or web address]

## 2020-07-25 NOTE — PDOC H&P
History of Present Illness


Admission Date/PCP: 


  20 12:45





  [facility]





Patient complains of: Difficulty with breathinhg


History of Present Illness: 


VI PAIGE is a 55 year old female known to my practice who was 

discharged home on 2020 after presenting with altered mental status and 

treated for UTI as well as aspiration pneumonia/pneumonitis with respiratory 

decompensation and brief transfer to ICU during that admission. Also, her CT 

abdomen and pelvis revealed staghorn calculi with hydronephrosis as possible 

predisposing factor for her recurrent UTI. Complicating her medical morbidities 

is development of bilateral sacral decubitus ulcers and her underlying 

immobility due to advance multiple sclerosis. She was brought back to the ED via

EMS due to development of respiratory distress. Family reported that patient 

tolerated oral feeding at lunch and diner but was left in recumbent position 

after diner and found in respiratory distress this morning. EMS personnel found 

her hypotensive with need for IO infusion therapy. Her initial ED evaluation was

significant for hypotension, tachycardia, tachypnea, leukocytosis, abnormal 

urinalysis, and hypercapnia. Her morbidities are as listed below. She was 

advised hospitalization for further evaluation and treatment.








Past Medical History


Cardiac Medical History: Reports: Pulmonary Embolism - 2 years ago


   Denies: Atrial Fibrillation, Congestive Heart Failure, Myocardial Infarction,

Hyperlipidema, Hypertension


Pulmonary Medical History: 


   Denies: Asthma, Bronchitis, Chronic Obstructive Pulmonary Disease (COPD), 

Pneumonia, Respiratory Failure, Sleep Apnea, Tuberculosis


Neurological Medical History: 


   Denies: Migraine, Seizures


Endocrine Medical History: Reports: Diabetes Mellitus Type 2, Hypothyroidism


   Denies: Diabetes Mellitus Type 1


Renal/ Medical History: 


   Denies: End Stage Renal Disease


Malignancy Medical History: 


   Denies: Leukemia, Lung Cancer


GI Medical History: 


   Denies: Gastroesophageal Reflux Disease, Hiatal Hernia


Musculoskeltal Medical History: 


   Denies: Arthritis


Psychiatric Medical History: 


   Denies: Attention Deficit Hyperactivity Disorder, Bipolar Disorder, Dementia,

Depression


Hematology: Reports: Anemia


   Denies: Hemophilia, Sickle Cell Disease


Infectious Medical History: 


   Denies: HIV





Past Surgical History


Past Surgical History: Reports:  Section - x2, Cholecystectomy


   Denies: Appendectomy, Coronary Artery Bypass Graft, Gastric Bypass Surgery, 

Herniorrhaphy, Hysterectomy, Mastectomy, Pacemaker, Tonsillectomy, Tubal 

Ligation





Social History


Smoking Status: Never Smoker


Frequency of Alcohol Use: None


Hx Recreational Drug Use: No


Drugs: None


Hx Prescription Drug Abuse: No





- Advance Directive


Resuscitation Status: Full Code





Family History


Family History: Reviewed & Not Pertinent


Parental Family History Reviewed: Yes


Children Family History Reviewed: Yes


Sibling(s) Family History Reviewed.: Yes





Medication/Allergy


Home Medications: 








Baclofen [Baclofen 10 mg Tablet] 10 mg PO Q8HP PRN 20 








Allergies/Adverse Reactions: 


                                        





latex [Latex] Allergy (Intermediate, Verified 20 16:45)


   RASH/BLISTER


Sulfa (Sulfonamide Antibiotics) Allergy (Intermediate, Verified 20 16:45)


   RASH/HIVES


bee venom protein (honey bee) Allergy (Verified 20 16:45)


   Anaphylaxis











Review of Systems


Constitutional: ABSENT: chills, fever(s), headache(s), weight gain, weight loss


Eyes: ABSENT: visual disturbances


Ears: ABSENT: hearing changes


Cardiovascular: ABSENT: chest pain, dyspnea on exertion, edema, orthropnea, 

palpitations


Respiratory: PRESENT: dyspnea.  ABSENT: cough, hemoptysis


Gastrointestinal: ABSENT: abdominal pain, constipation, diarrhea, hematemesis, 

hematochezia, nausea, vomiting


Genitourinary: PRESENT: other - indwelling Cherry catheter.  ABSENT: dysuria, 

hematuria


Musculoskeletal: ABSENT: joint swelling


Integumentary: ABSENT: rash, wounds


Neurological: PRESENT: abnormal gait - due to advance multiople sclerosis, 

abnormal speech - due to advance multiople sclerosis, focal weakness - due to 

advance multiople sclerosis.  ABSENT: confusion, dizziness, syncope


Psychiatric: ABSENT: anxiety, depression, homidical ideation, suicidal ideation


Endocrine: ABSENT: cold intolerance, heat intolerance, polydipsia, polyuria


Hematologic/Lymphatic: ABSENT: easy bleeding, easy bruising, lymphadenopathy


Allergic/Immunologic: ABSENT: seasonal rhinorrhea





Physical Exam


Vital Signs: 


                                        











Temp Pulse Resp BP Pulse Ox


 


 97.3 F   88   15   138/71 H  97 


 


 20 15:29  20 15:30  20 15:29  20 15:29  20 15:29








                                 Intake & Output











 20





 06:59 06:59 06:59


 


Intake Total   3151


 


Output Total   1645


 


Balance   1506


 


Weight   77.9 kg











Physical Exam: 





General appearance: PRESENT: no acute distress


Head exam: PRESENT: atraumatic, normocephalic


Eye exam: PRESENT: conjunctiva pink, EOMI, PERRLA.  ABSENT: scleral icterus


Ear exam: PRESENT: normal external ear exam


Mouth exam: PRESENT: dry mucosa


Neck exam: ABSENT: thyromegaly


Respiratory exam: PRESENT: clear to auscultation elver, decreased breath sounds - 

at lung bases


Cardiovascular exam: PRESENT: RRR, +S1, +S2, tachycardia.  ABSENT: diastolic 

murmur, rubs, systolic murmur


Vascular exam: ABSENT: pallor


GI/Abdominal exam: PRESENT: normal bowel sounds, soft.  ABSENT: distended, 

guarding, mass, organolmegaly, rebound, tenderness


: Indwelling Cherry catheter.


Rectal exam: PRESENT: deferred


Extremities exam: ABSENT: pedal edema


Musculoskeletal exam: PRESENT: deformity - related to her advanced multiple 

sclerosis spastic contracture deformity


Neurological exam: PRESENT: alert, awake, oriented to person, oriented to place,

 oriented to time, oriented to situation, abnormal gait - bedbound due to 

advanced multiple sclerosi


Psychiatric exam: PRESENT: appropriate affect, normal mood.  ABSENT: homicidal 

ideation, suicidal ideation


Skin exam: PRESENT: dry, warm, other - stage 3 bilateral sacral decubitii ulcers





Results


Laboratory Results: 


                                        





                                 20 08:45 





                                 20 08:45 





                                        











  20





  08:45 08:45 08:45


 


WBC  12.2 H  


 


RBC  4.46  


 


Hgb  12.2  


 


Hct  36.8  


 


MCV  83  


 


MCH  27.5  


 


MCHC  33.2  


 


RDW  17.6 H  


 


Plt Count  435  


 


Seg Neutrophils %  79.4 H  


 


Carbonic Acid   


 


HCO3/H2CO3 Ratio   


 


ABG pH   


 


ABG pCO2   


 


ABG pO2   


 


ABG HCO3   


 


ABG O2 Saturation   


 


ABG Base Excess   


 


VBG pH   


 


VBG pCO2   


 


VBG HCO3   


 


VBG Base Excess   


 


FiO2   


 


Sodium   137.9 


 


Potassium   4.4 


 


Chloride   103 


 


Carbon Dioxide   28 


 


Anion Gap   7 


 


BUN   8 


 


Creatinine   0.46 L 


 


Est GFR ( Amer)   > 60 


 


Glucose   166 H 


 


Lactic Acid    1.1


 


Calcium   9.7 


 


Total Bilirubin   0.7 


 


AST   21 


 


Alkaline Phosphatase   105 


 


Total Protein   7.4 


 


Albumin   3.0 L 


 


Lipase   63.1 


 


Urine Color   


 


Urine Appearance   


 


Urine pH   


 


Ur Specific Gravity   


 


Urine Protein   


 


Urine Glucose (UA)   


 


Urine Ketones   


 


Urine Blood   


 


Urine Nitrite   


 


Ur Leukocyte Esterase   


 


Urine WBC (Auto)   


 


Urine RBC (Auto)   














  20





  08:45 08:45 09:45


 


WBC   


 


RBC   


 


Hgb   


 


Hct   


 


MCV   


 


MCH   


 


MCHC   


 


RDW   


 


Plt Count   


 


Seg Neutrophils %   


 


Carbonic Acid    1.40 H


 


HCO3/H2CO3 Ratio    19:1


 


ABG pH    7.38


 


ABG pCO2    46.5 H


 


ABG pO2    96.6


 


ABG HCO3    26.6 H


 


ABG O2 Saturation    97.2


 


ABG Base Excess    1.0


 


VBG pH  7.25 L  


 


VBG pCO2  68.8 H*  


 


VBG HCO3  29.6  


 


VBG Base Excess  0.7  


 


FiO2    65%


 


Sodium   


 


Potassium   


 


Chloride   


 


Carbon Dioxide   


 


Anion Gap   


 


BUN   


 


Creatinine   


 


Est GFR (African Amer)   


 


Glucose   


 


Lactic Acid   


 


Calcium   


 


Total Bilirubin   


 


AST   


 


Alkaline Phosphatase   


 


Total Protein   


 


Albumin   


 


Lipase   


 


Urine Color   YELLOW 


 


Urine Appearance   SLIGHTLY-CLOUDY 


 


Urine pH   8.0 


 


Ur Specific Gravity   1.006 


 


Urine Protein   NEGATIVE 


 


Urine Glucose (UA)   NEGATIVE 


 


Urine Ketones   NEGATIVE 


 


Urine Blood   LARGE H 


 


Urine Nitrite   NEGATIVE 


 


Ur Leukocyte Esterase   LARGE H 


 


Urine WBC (Auto)   75 


 


Urine RBC (Auto)   34 








                                        











  20





  08:45 08:45


 


Creatine Kinase  30 


 


Troponin I   0.050


 


NT-Pro-B Natriuret Pep   6640 H











Impressions: 


                                        





Chest X-Ray  20 08:59


IMPRESSION:  NO ACUTE RADIOGRAPHIC FINDING IN THE CHEST.


 














Assessment & Plan





- Diagnosis


(1) Acute respiratory distress


Is this a current diagnosis for this admission?: Yes   


Plan: 


See admitting attending physician orders for details about care plan.








(2) Urinary tract infection


Qualifiers: 


 


Is this a current diagnosis for this admission?: Yes   


Plan: 


See admitting attending physician orders for details about care plan.








(3) Pressure ulcer of unspecified buttock, stage 3


Qualifiers: 


   Laterality: unspecified laterality 


Is this a current diagnosis for this admission?: Yes   


Plan: 


See admitting attending physician orders for details about care plan.








(4) Sepsis


Qualifiers: 


   Sepsis type: sepsis due to unspecified organism   Sepsis acute organ 

dysfunction status: with acute organ dysfunction   Severe sepsis acute organ 

dysfunction type: acute respiratory failure   Acute respiratory failure type: 

with hypercapnia   Severe sepsis shock status: unspecified   Qualified Code(s): 

A41.9 - Sepsis, unspecified organism; R65.20 - Severe sepsis without septic 

shock; J96.02 - Acute respiratory failure with hypercapnia   


Is this a current diagnosis for this admission?: Yes   


Plan: 


See admitting attending physician orders for details about care plan.








(5) Staghorn kidney stones


Is this a current diagnosis for this admission?: Yes   


Plan: 


See admitting attending physician orders for details about care plan.








(6) Hydronephrosis of left kidney


Is this a current diagnosis for this admission?: Yes   


Plan: 


See admitting attending physician orders for details about care plan.








(7) Multiple sclerosis, primary chronic progressive


Is this a current diagnosis for this admission?: Yes   


Plan: 


See admitting attending physician orders for details about care plan.








- Time


Time Spent: 50 to 70 Minutes


Medications reviewed and adjusted accordingly: Yes


Anticipated discharge: SNF


Anticipated DC Timeframe: Other





- Inpatient Certification


Based on my medical assessment, after consideration of the patient's 

comorbidities, presenting symptoms, or acuity I expect that the services needed 

warrant INPATIENT care.: Yes


I certify that my determination is in accordance with my understanding of 

Medicare's requirements for reasonable and necessary INPATIENT services [42 CFR 

412.3e].: Yes


Medical Necessity: Significant Comorbidiites Make Outpatient Treatment Too 

Risky, Need Close Monitoring Due to Risk of Patient Decompensation, Need For IV 

Fluids, Need For Continuous Telemetry Monitoring, Need for IV Antibiotics, Need 

for Surgery, Risk of Complication if Not Cared For in Hospital, Risk of 

Diagnosis Which Will Require Inpatient Eval/Care/Monitoring


Post Hospital Care: D/C or Transfer Summary





- Plan Summary


Plan Summary: 





See admitting attending physician orders for details about care plan.

## 2020-07-25 NOTE — ER DOCUMENT REPORT
Entered by KARMA BLAKE SCRIBE  20 1114 





Acting as scribe for:SHIMA MONTENEGRO MD





ED Respiratory Problem





- General


Chief Complaint: Respiratory Distress


Stated Complaint: RESPIRATORY DISTRESS


Primary Care Provider: 


MEG MCCARTHY MD [Primary Care Provider] - Follow up as needed


Information source: Emergency Med Personnel, [facility] Records


Cannot obtain history due to: Unstable vital signs


Notes: 





This 55 year old female patient presents to the emergency department today with 

arrival by EMS for respiratory distress. Patient's history is provided by EMS 

personnel and OM records due to unstable vital signs. Patient was discharged 

yesterday from the hospital after being admitted for more than x2 weeks. Patient

was being treated for numerous reasons including confusion, tachycardia, renal 

mass on left kidney, recurrent aspiration pneumonitis/pneumonia, and bilateral 

stage III sacral decubitus ulcers. Patient has a history of MS, x2 pulmonary 

embolisms, and DM. Patient hypoxic on arrival to the ED with a systolic pressure

of 82. EMS personnel placed a IO in right tibia en route.


TRAVEL OUTSIDE OF THE [place] IN LAST 30 DAYS: No





- Related Data


Allergies/Adverse Reactions: 


                                        





latex [Latex] Allergy (Intermediate, Verified 20 16:45)


   RASH/BLISTER


Sulfa (Sulfonamide Antibiotics) Allergy (Intermediate, Verified 20 16:45)


   RASH/HIVES


bee venom protein (honey bee) Allergy (Verified 20 16:45)


   Anaphylaxis











Past Medical History





- General


Information source: Emergency Med Personnel, [facility] Records


Cannot obtain history due to: Unstable vital signs





- Social History


Smoking Status: Unknown if Ever Smoked


Family History: Reviewed & Not Pertinent


Patient has homicidal ideation: No





- Past Medical History


Cardiac Medical History: Reports: Hx Pulmonary Embolism - 2 years ago


Endocrine Medical History: Reports: Hx Diabetes Mellitus Type 2, Hx 

Hypothyroidism


Musculoskeletal Medical History: Reports Hx Multiple Sclerosis


Past Surgical History: Reports: Hx  Section - x2, Hx Cholecystectomy





- Immunizations


Immunizations up to date: Yes


Hx Diphtheria, Pertussis, Tetanus Vaccination: Yes





Review of Systems





- Review of Systems


-: Yes ROS unobtainable due to patient's medical condition





Physical Exam





- Vital signs


Vitals: 


                                        











Pulse Ox


 


 99 


 


 20 08:35














- General


General appearance: Alert


Notes: 


Patient is febrile and in distress.





- HEENT


Head: Normocephalic, Atraumatic


Eyes: Normal


Extraocular movements intact: Yes


Pupils: PERRL





- Respiratory


Respiratory status: Respiratory distress, Tachypnea


Notes: 


Upper airway transmitted sounds. Better after suction.


Patient placed on bi-pap with oxygen saturation improving to 98%.





- Cardiovascular


Rhythm: Tachycardia


Heart sounds: Normal auscultation


Notes: 


Hypoxic on arrival to ED.  





- Abdominal


Inspection: Normal - Soft


Distension: No distension


Tenderness: Nontender





- Genitourinary


Notes: 


Patient is in a diaper. Cherry catheter is present.





- Extremities


Notes: 


Bilateral upper and lower extremities are flaccid. 


IO in right tibia from EMS. 





- Neurological


Notes: 


Patient is awake and alert with her eyes open. 


Speech was normal and able to communicate some. 





- Psychological


Associated symptoms: Normal affect, Normal mood





- Skin


Skin Temperature: Warm


Skin Moisture: Diaphoretic


Skin Color: Erythema


Notes: 


Bilateral stage III sacral decubitus ulcers.





Course





- Re-evaluation


Re-evalutation: 





20 12:13


Patient currently is hemodynamically stable blood pressure of 105/63 with a 

heart rate of 91 sats are 99% respiratory rate 20 on 2 L nasal O2.


20 12:17


On arrival patient was brought in by EMS in extreme respiratory distress 

tachycardia tachypnea febrile sweaty and hypotensive.  Insert osseous IV access 

had been obtained by EMS in route.  Patient was been bagged by bag valve mask 

oxygen for support.  Patient was noted to be hypoxic and a BiPAP was 

administered to control respirations and improve oxygenation.  Another 

intraosseous was obtained in the left proximal tibia, and patient was fluid 

resuscitated with 2 L normal saline, with return of systolic blood pressure to 

110 and tachycardia decreased to around 127.





Patient then received and central line placement successfully obtained by 

 on the left internal jugular.  Post chest x-ray postprocedure was 

showing no pneumothorax and no infiltrate noted.





Discussed case with the intensivist Dr. Atkins, who attended patient's bedside 

examination by him and he determined that patient did not need to be in the 

intensive care unit at this time inasmuch as she has been resuscitated with 

fluids and is hemodynamically stable with nasal cannula O2 of 2 L with sats of 

100%.  Patient also received IV antibiotics for urosepsis including Levaquin and

 vancomycin.





Discussed case with family and learned that patient was a full CODE STATUS with 

all supportive services as required.\





Discussed case with the primary attending Dr. Mccarthy who admitted patient to 

the [facility].





- Vital Signs


Vital signs: 


                                        











Temp Pulse Resp BP Pulse Ox


 


 98.2 F      18   107/67   99 


 


 20 09:04     20 12:16  20 12:16  20 12:16











20 12:14


Vital signs stable.





- Laboratory


Result Diagrams: 


                                 20 08:45





                                 20 08:45


Laboratory results interpreted by me: 


                                        











  20





  08:45 08:45 08:45


 


WBC  12.2 H  


 


RDW  17.6 H  


 


Absolute Neuts (auto)  9.7 H  


 


Seg Neutrophils %  79.4 H  


 


Carbonic Acid   


 


ABG pCO2   


 


ABG HCO3   


 


ABG Total CO2   


 


VBG pH   


 


VBG pCO2   


 


Creatinine   0.46 L 


 


Glucose   166 H 


 


NT-Pro-B Natriuret Pep    6640 H


 


Albumin   3.0 L 


 


Urine Blood   


 


Urine Urobilinogen   


 


Ur Leukocyte Esterase   














  20





  08:45 08:45 09:45


 


WBC   


 


RDW   


 


Absolute Neuts (auto)   


 


Seg Neutrophils %   


 


Carbonic Acid    1.40 H


 


ABG pCO2    46.5 H


 


ABG HCO3    26.6 H


 


ABG Total CO2    28.0 H


 


VBG pH  7.25 L  


 


VBG pCO2  68.8 H*  


 


Creatinine   


 


Glucose   


 


NT-Pro-B Natriuret Pep   


 


Albumin   


 


Urine Blood   LARGE H 


 


Urine Urobilinogen   2.0 H 


 


Ur Leukocyte Esterase   LARGE H 











20 12:14


Abnormal values include a white blood cell count elevation urinary tract 

infection with large amount of blood and leukocyte esterase positive





- Diagnostic Test


Radiology reviewed: Image reviewed, Reports reviewed


Radiology results interpreted by me: 





20 12:15


Chest x-ray shows no acute process no infiltrate noted.





Second chest x-ray shows central line placement on the left side no evidence of 

any pneumothorax.  Again no infiltrate.





- EKG Interpretation by Me


Additional EKG results interpreted by me: 





20 12:16


Twelve-lead EKG on arrival sinus tachycardia rate of 151 nonspecific ST-T wave 

changes.  Second EKG shows sinus tachycardia rate of 147 nonspecific ST-T wave 

changes.





Critical Care Note





- Critical Care Note


Total time excluding time spent on procedures (mins): 60 - Critical care time 

spent on patient immediate arrival in extreme respiratory distress requiring 

oxygen suctioning BiPAP ordering, also fluid resuscitation for hypotension, IV 

antibiotic administration for acute sepsis, and discussing patient's conditions 

and diagnosis with the intensivist and the admitting physician and discussing 

with family members her condition.





Discharge





- Discharge


Clinical Impression: 


 Respiratory distress, Sepsis, Acute respiratory distress, Dehydration, Pressure

 ulcer of unspecified buttock, stage 3, Multiple sclerosis, Urinary tract 

infection





Condition: Serious


Disposition: ADMITTED AS INPATIENT


Admitting Provider: Aura


Unit Admitted: IMCU


Referrals: 


MEG MCCARTHY MD [Primary Care Provider] - Follow up as needed





I personally performed the services described in the documentation, reviewed and

edited the documentation which was dictated to the scribe in my presence, and it

accurately records my words and actions.

## 2020-07-25 NOTE — PDOC CONSULTATION
Consultation


Consult Date: 20


Provider Consulted: SURGICAL SURGICALIST MD


Consult reason:: ishial decubitus ulcers





History of Present Illness


Admission Date/PCP: 


  20 12:45





  MEG MCCARTHY





History of Present Illness: 


VI PAIGE is a 55 year old female seen in consultation at the request of

Dr. Mccarthy. She has been seen by general suregery before, and underwent 

bedside debridement of her bilateral ischial decubitus ulscers. She has severe 

MS and is currently bedridden. The nursing staff reports performing damp to dry 

dressing changes to her decubiti qDay. She denies any pain, shortness of breath,

dizziness, nausea, vomiting, chest pain, seizures, headache.








Past Medical History


Cardiac Medical History: Reports: Pulmonary Embolism - 2 years ago


   Denies: Atrial Fibrillation, Congestive Heart Failure, Myocardial Infarction,

Hyperlipidema, Hypertension


Pulmonary Medical History: 


   Denies: Asthma, Bronchitis, Chronic Obstructive Pulmonary Disease (COPD), 

Pneumonia, Respiratory Failure, Sleep Apnea, Tuberculosis


Neurological Medical History: 


   Denies: Migraine, Seizures


Endocrine Medical History: Reports: Diabetes Mellitus Type 2, Hypothyroidism


   Denies: Diabetes Mellitus Type 1


Renal/ Medical History: 


   Denies: End Stage Renal Disease


Malignancy Medical History: 


   Denies: Leukemia, Lung Cancer


GI Medical History: 


   Denies: Gastroesophageal Reflux Disease, Hiatal Hernia


Musculoskeltal Medical History: 


   Denies: Arthritis


Psychiatric Medical History: 


   Denies: Attention Deficit Hyperactivity Disorder, Bipolar Disorder, Dementia,

Depression


Hematology: Reports: Anemia


   Denies: Hemophilia, Sickle Cell Disease


Infectious Medical History: 


   Denies: HIV





Past Surgical History


Past Surgical History: Reports:  Section - x2, Cholecystectomy, Other - 

bedside debridement of bilateral ischial decubitus ulcers


   Denies: Appendectomy, Coronary Artery Bypass Graft, Gastric Bypass Surgery, 

Herniorrhaphy, Hysterectomy, Mastectomy, Pacemaker, Tonsillectomy, Tubal 

Ligation





Social History


Smoking Status: Never Smoker


Frequency of Alcohol Use: None


Hx Recreational Drug Use: No


Drugs: None


Hx Prescription Drug Abuse: No





Family History


Family History: Reviewed & Not Pertinent


Parental Family History Reviewed: Yes


Children Family History Reviewed: Yes


Sibling(s) Family History Reviewed.: Yes





Medication/Allergy


Home Medications: 








Baclofen [Baclofen 10 mg Tablet] 10 mg PO Q8HP PRN 20 








Allergies/Adverse Reactions: 


                                        





latex [Latex] Allergy (Intermediate, Verified 20 16:45)


   RASH/BLISTER


Sulfa (Sulfonamide Antibiotics) Allergy (Intermediate, Verified 20 16:45)


   RASH/HIVES


bee venom protein (honey bee) Allergy (Verified 20 16:45)


   Anaphylaxis











Review of Systems


Constitutional: ABSENT: anorexia, chills, fatigue, fever(s), headache(s)


Eyes: ABSENT: visual disturbances


Ears: ABSENT: hearing changes


Nose, Mouth, and Throat: ABSENT: sore throat


Cardiovascular: ABSENT: chest pain


Respiratory: ABSENT: cough


Gastrointestinal: ABSENT: abdominal pain, bloating


Genitourinary: ABSENT: dysuria


Musculoskeletal: ABSENT: back pain, deformity


Integumentary: PRESENT: wounds, other - worsening decubitus ulcers.  ABSENT: 

pruritus, rash


Neurological: ABSENT: confusion, convulsions, dizziness


Psychiatric: ABSENT: anxiety, depression


Endocrine: ABSENT: cold intolerance, heat intolerance


Hematologic/Lymphatic: ABSENT: easy bleeding, easy bruising





Physical Exam


Vital Signs: 


                                        











Temp Pulse Resp BP Pulse Ox


 


 97.3 F   88   15   138/71 H  97 


 


 20 15:29  20 15:30  20 15:29  20 15:29  20 15:29








                                 Intake & Output











 20





 06:59 06:59 06:59


 


Intake Total   3151


 


Output Total   1645


 


Balance   1506


 


Weight   77.9 kg











General appearance: PRESENT: no acute distress, cooperative


Eye exam: PRESENT: EOMI, PERRLA.  ABSENT: scleral icterus


Mouth exam: PRESENT: moist, neck supple


Neck exam: ABSENT: thyromegaly, tracheal deviation, tracheostomy


Respiratory exam: PRESENT: unlabored.  ABSENT: tachypnea, wheezes


Cardiovascular exam: ABSENT: tachycardia


Vascular exam: PRESENT: normal capillary refill


GI/Abdominal exam: PRESENT: soft.  ABSENT: distended, firm, guarding, tenderness


Rectal exam: PRESENT: normal inspection


Musculoskeletal exam: ABSENT: ambulatory


Neurological exam: PRESENT: awake, oriented to person, oriented to place, 

oriented to time, oriented to situation


Psychiatric exam: ABSENT: agitated, anxious, depressed


Focused psych exam: ABSENT: delusional


Skin exam: PRESENT: other - stage III/IV ischial decubitus ulcer bilaterally. 

Worsening necrosis of adjacent skin bilaterally.





Results


Laboratory Results: 


                                        





                                 20 08:45 





                                 20 08:45 





                                        











  20





  08:45 08:45 08:45


 


WBC  12.2 H  


 


RBC  4.46  


 


Hgb  12.2  


 


Hct  36.8  


 


MCV  83  


 


MCH  27.5  


 


MCHC  33.2  


 


RDW  17.6 H  


 


Plt Count  435  


 


Seg Neutrophils %  79.4 H  


 


Carbonic Acid   


 


HCO3/H2CO3 Ratio   


 


ABG pH   


 


ABG pCO2   


 


ABG pO2   


 


ABG HCO3   


 


ABG O2 Saturation   


 


ABG Base Excess   


 


VBG pH   


 


VBG pCO2   


 


VBG HCO3   


 


VBG Base Excess   


 


FiO2   


 


Sodium   137.9 


 


Potassium   4.4 


 


Chloride   103 


 


Carbon Dioxide   28 


 


Anion Gap   7 


 


BUN   8 


 


Creatinine   0.46 L 


 


Est GFR ( Amer)   > 60 


 


Glucose   166 H 


 


Lactic Acid    1.1


 


Calcium   9.7 


 


Total Bilirubin   0.7 


 


AST   21 


 


Alkaline Phosphatase   105 


 


Total Protein   7.4 


 


Albumin   3.0 L 


 


Lipase   63.1 


 


Urine Color   


 


Urine Appearance   


 


Urine pH   


 


Ur Specific Gravity   


 


Urine Protein   


 


Urine Glucose (UA)   


 


Urine Ketones   


 


Urine Blood   


 


Urine Nitrite   


 


Ur Leukocyte Esterase   


 


Urine WBC (Auto)   


 


Urine RBC (Auto)   














  20





  08:45 08:45 09:45


 


WBC   


 


RBC   


 


Hgb   


 


Hct   


 


MCV   


 


MCH   


 


MCHC   


 


RDW   


 


Plt Count   


 


Seg Neutrophils %   


 


Carbonic Acid    1.40 H


 


HCO3/H2CO3 Ratio    19:1


 


ABG pH    7.38


 


ABG pCO2    46.5 H


 


ABG pO2    96.6


 


ABG HCO3    26.6 H


 


ABG O2 Saturation    97.2


 


ABG Base Excess    1.0


 


VBG pH  7.25 L  


 


VBG pCO2  68.8 H*  


 


VBG HCO3  29.6  


 


VBG Base Excess  0.7  


 


FiO2    65%


 


Sodium   


 


Potassium   


 


Chloride   


 


Carbon Dioxide   


 


Anion Gap   


 


BUN   


 


Creatinine   


 


Est GFR (African Amer)   


 


Glucose   


 


Lactic Acid   


 


Calcium   


 


Total Bilirubin   


 


AST   


 


Alkaline Phosphatase   


 


Total Protein   


 


Albumin   


 


Lipase   


 


Urine Color   YELLOW 


 


Urine Appearance   SLIGHTLY-CLOUDY 


 


Urine pH   8.0 


 


Ur Specific Gravity   1.006 


 


Urine Protein   NEGATIVE 


 


Urine Glucose (UA)   NEGATIVE 


 


Urine Ketones   NEGATIVE 


 


Urine Blood   LARGE H 


 


Urine Nitrite   NEGATIVE 


 


Ur Leukocyte Esterase   LARGE H 


 


Urine WBC (Auto)   75 


 


Urine RBC (Auto)   34 








                                        











  20





  08:45 08:45


 


Creatine Kinase  30 


 


Troponin I   0.050


 


NT-Pro-B Natriuret Pep   6640 H











Impressions: 


                                        





Chest X-Ray  20 08:59


IMPRESSION:  NO ACUTE RADIOGRAPHIC FINDING IN THE CHEST.


 














Assessment & Plan





- Diagnosis


(1) Pressure ulcer of unspecified buttock, stage 3


Qualifiers: 


   Laterality: unspecified laterality 


Is this a current diagnosis for this admission?: Yes   





- Plan Summary


Plan Summary: 





54 y/o F with severe MS. She is nonambulatory. She has worsening of her 

bilateral ischial decubiti. Again, I would recommend an aggressive turning 

regimen to minimize pressure. She should also receive protein/nutritional 

supplementation. She will require further debridement of both right and left 

decubitus ulcers due to worsening necrosis and progression of her decubiti. I 

will perform this at the bedside tomorrow morning. Continue with damp to dry 

dressing changes BID for now. Antibiotics do not appear to be necessary for this

 problem. Risk/benefits discussed, informed consent obtained, and all questions 

answered.

## 2020-07-25 NOTE — EKG REPORT
SEVERITY:- ABNORMAL ECG -

SINUS TACHYCARDIA

NONSPECIFIC T ABNORMALITIES, DIFFUSE LEADS

:

Confirmed by: Ale Price 25-Jul-2020 10:23:01

## 2020-07-26 LAB
ADD MANUAL DIFF: NO
ALBUMIN SERPL-MCNC: 2.3 G/DL (ref 3.5–5)
ALP SERPL-CCNC: 75 U/L (ref 38–126)
ANION GAP SERPL CALC-SCNC: 6 MMOL/L (ref 5–19)
AST SERPL-CCNC: 21 U/L (ref 14–36)
BASOPHILS # BLD AUTO: 0 10^3/UL (ref 0–0.2)
BASOPHILS NFR BLD AUTO: 0.6 % (ref 0–2)
BILIRUB DIRECT SERPL-MCNC: 0.1 MG/DL (ref 0–0.4)
BILIRUB SERPL-MCNC: 0.6 MG/DL (ref 0.2–1.3)
BUN SERPL-MCNC: 6 MG/DL (ref 7–20)
CALCIUM: 8.7 MG/DL (ref 8.4–10.2)
CHLORIDE SERPL-SCNC: 106 MMOL/L (ref 98–107)
CO2 SERPL-SCNC: 26 MMOL/L (ref 22–30)
EOSINOPHIL # BLD AUTO: 0.1 10^3/UL (ref 0–0.6)
EOSINOPHIL NFR BLD AUTO: 1.4 % (ref 0–6)
ERYTHROCYTE [DISTWIDTH] IN BLOOD BY AUTOMATED COUNT: 17.2 % (ref 11.5–14)
GLUCOSE SERPL-MCNC: 79 MG/DL (ref 75–110)
HCT VFR BLD CALC: 27.8 % (ref 36–47)
HGB BLD-MCNC: 9.2 G/DL (ref 12–15.5)
LYMPHOCYTES # BLD AUTO: 0.7 10^3/UL (ref 0.5–4.7)
LYMPHOCYTES NFR BLD AUTO: 9.8 % (ref 13–45)
MCH RBC QN AUTO: 27 PG (ref 27–33.4)
MCHC RBC AUTO-ENTMCNC: 33.2 G/DL (ref 32–36)
MCV RBC AUTO: 82 FL (ref 80–97)
MONOCYTES # BLD AUTO: 0.5 10^3/UL (ref 0.1–1.4)
MONOCYTES NFR BLD AUTO: 6.7 % (ref 3–13)
NEUTROPHILS # BLD AUTO: 5.9 10^3/UL (ref 1.7–8.2)
NEUTS SEG NFR BLD AUTO: 81.5 % (ref 42–78)
PLATELET # BLD: 245 10^3/UL (ref 150–450)
POTASSIUM SERPL-SCNC: 3.4 MMOL/L (ref 3.6–5)
PROT SERPL-MCNC: 6.1 G/DL (ref 6.3–8.2)
RBC # BLD AUTO: 3.42 10^6/UL (ref 3.72–5.28)
TOTAL CELLS COUNTED % (AUTO): 100 %
WBC # BLD AUTO: 7.2 10^3/UL (ref 4–10.5)

## 2020-07-26 PROCEDURE — 02HV33Z INSERTION OF INFUSION DEVICE INTO SUPERIOR VENA CAVA, PERCUTANEOUS APPROACH: ICD-10-PCS | Performed by: EMERGENCY MEDICINE

## 2020-07-26 PROCEDURE — 0KBP0ZZ EXCISION OF LEFT HIP MUSCLE, OPEN APPROACH: ICD-10-PCS | Performed by: SURGERY

## 2020-07-26 PROCEDURE — 0KBN0ZZ EXCISION OF RIGHT HIP MUSCLE, OPEN APPROACH: ICD-10-PCS | Performed by: SURGERY

## 2020-07-26 RX ADMIN — Medication SCH UNIT: at 06:14

## 2020-07-26 RX ADMIN — POTASSIUM CHLORIDE SCH MLS/HR: 29.8 INJECTION, SOLUTION INTRAVENOUS at 21:17

## 2020-07-26 RX ADMIN — Medication SCH UNIT: at 13:17

## 2020-07-26 RX ADMIN — VANCOMYCIN HYDROCHLORIDE SCH MLS/HR: 1 INJECTION, POWDER, LYOPHILIZED, FOR SOLUTION INTRAVENOUS at 12:43

## 2020-07-26 RX ADMIN — ENOXAPARIN SODIUM SCH MG: 40 INJECTION SUBCUTANEOUS at 09:27

## 2020-07-26 RX ADMIN — Medication PRN UNIT: at 13:20

## 2020-07-26 RX ADMIN — OXYCODONE HYDROCHLORIDE AND ACETAMINOPHEN SCH MG: 500 TABLET ORAL at 18:30

## 2020-07-26 RX ADMIN — POTASSIUM CHLORIDE SCH MLS/HR: 29.8 INJECTION, SOLUTION INTRAVENOUS at 18:30

## 2020-07-26 RX ADMIN — LEVOFLOXACIN SCH MLS/HR: 500 INJECTION, SOLUTION INTRAVENOUS at 09:30

## 2020-07-26 RX ADMIN — PANTOPRAZOLE SODIUM SCH MG: 40 TABLET, DELAYED RELEASE ORAL at 06:14

## 2020-07-26 NOTE — PDOC PROGRESS REPORT
Subjective


Progress Note for:: 07/26/20


Subjective:: 





No reported fever or chills. No chest pain or difficulty with her breathing. S/P

bilateral ischial pressure ulcer debridement. She remain on IV Vancomycin and 

Cefepime coverage.


Reason For Visit: 


ACUTE RESPIRATORY DISTRESS,SEPSIS,URINARY TRACT








Physical Exam


Vital Signs: 


                                        











Temp Pulse Resp BP Pulse Ox


 


 98.4 F   116 H  28 H  124/66   96 


 


 07/26/20 15:56  07/26/20 15:56  07/26/20 15:56  07/26/20 15:56  07/26/20 15:56








                                 Intake & Output











 07/25/20 07/26/20 07/27/20





 06:59 06:59 06:59


 


Intake Total  3401 350


 


Output Total  2395 


 


Balance  1006 350


 


Weight  79.9 kg 











General appearance: PRESENT: no acute distress


Head exam: PRESENT: atraumatic, normocephalic


Eye exam: PRESENT: conjunctiva pink.  ABSENT: scleral icterus


Mouth exam: PRESENT: moist


Respiratory exam: PRESENT: clear to auscultation elver, decreased breath sounds - 

at lung bases


Cardiovascular exam: PRESENT: RRR, +S1, +S2.  ABSENT: diastolic murmur, rubs, 

systolic murmur


Vascular exam: ABSENT: pallor


GI/Abdominal exam: PRESENT: normal bowel sounds, soft.  ABSENT: distended, 

guarding, mass, organolmegaly, rebound, tenderness


Neurological exam: PRESENT: alert, awake.  ABSENT: motor sensory deficit


Psychiatric exam: PRESENT: appropriate affect, normal mood.  ABSENT: homicidal 

ideation, suicidal ideation


Skin exam: PRESENT: dry, warm, other - stage 4 bilateral ischial pressure ulcers





Results


Laboratory Results: 


                                        





                                 07/26/20 08:00 





                                 07/26/20 06:25 





                                        











  07/26/20 07/26/20 07/26/20





  06:25 06:25 08:00


 


WBC  Cancelled   7.2


 


RBC  Cancelled   3.42 L


 


Hgb  Cancelled   9.2 L D


 


Hct  Cancelled   27.8 L


 


MCV  Cancelled   82


 


MCH  Cancelled   27.0


 


MCHC  Cancelled   33.2


 


RDW  Cancelled   17.2 H


 


Plt Count  Cancelled   245


 


Seg Neutrophils %  Cancelled   81.5 H


 


Sodium   137.9 


 


Potassium   3.4 L D 


 


Chloride   106 


 


Carbon Dioxide   26 


 


Anion Gap   6 


 


BUN   6 L 


 


Creatinine   0.45 L 


 


Est GFR ( Amer)   > 60 


 


Glucose   79 


 


Calcium   8.7 


 


Total Bilirubin   0.6 


 


AST   21 


 


Alkaline Phosphatase   75 


 


Total Protein   6.1 L 


 


Albumin   2.3 L 








                                        











  07/25/20 07/25/20





  08:45 08:45


 


Creatine Kinase  30 


 


Troponin I   0.050


 


NT-Pro-B Natriuret Pep   6640 H











Impressions: 


                                        





Chest X-Ray  07/25/20 08:59


IMPRESSION:  NO ACUTE RADIOGRAPHIC FINDING IN THE CHEST.


 














Assessment & Plan





- Diagnosis


(1) Acute respiratory distress


Is this a current diagnosis for this admission?: Yes   





(2) Urinary tract infection


Qualifiers: 


 


Is this a current diagnosis for this admission?: Yes   





(3) Sepsis


Qualifiers: 


   Sepsis type: sepsis due to unspecified organism   Sepsis acute organ 

dysfunction status: with acute organ dysfunction   Severe sepsis acute organ 

dysfunction type: acute respiratory failure   Acute respiratory failure type: 

with hypercapnia   Severe sepsis shock status: unspecified   Qualified Code(s): 

A41.9 - Sepsis, unspecified organism; R65.20 - Severe sepsis without septic 

shock; J96.02 - Acute respiratory failure with hypercapnia   


Is this a current diagnosis for this admission?: Yes   





(4) Staghorn kidney stones


Is this a current diagnosis for this admission?: Yes   





(5) Hydronephrosis of left kidney


Is this a current diagnosis for this admission?: Yes   





(6) Multiple sclerosis, primary chronic progressive


Is this a current diagnosis for this admission?: Yes   





(7) Pressure ulcer of ischial area, stage 4


Qualifiers: 


   Laterality: unspecified laterality   Qualified Code(s): L89.304 - Pressure 

ulcer of unspecified buttock, stage 4   


Is this a current diagnosis for this admission?: Yes   





- Time


Time Spent with patient: 25-34 minutes


Level of Care: IMCU


Medications reviewed and adjusted accordingly: Yes


Anticipated discharge: Home with Homehealth, SNF


Anticipated DC Timeframe: Other





- Inpatient Certification


Based on my medical assessment, after consideration of the patient's 

comorbidities, presenting symptoms, or acuity I expect that the services needed 

warrant INPATIENT care.: Yes


I certify that my determination is in accordance with my understanding of 

Medicare's requirements for reasonable and necessary INPATIENT services [42 CFR 

412.3e].: Yes


Medical Necessity: Significant Comorbidiites Make Outpatient Treatment Too 

Risky, Need Close Monitoring Due to Risk of Patient Decompensation, Need For IV 

Fluids, Need For Continuous Telemetry Monitoring, Need for IV Antibiotics, Risk 

of Complication if Not Cared For in Hospital, Risk of Diagnosis Which Will 

Require Inpatient Eval/Care/Monitoring


Post Hospital Care: D/C Planner Documentation, D/C or Transfer Summary





- Plan Summary


Plan Summary: 





Continue current medication management. Maintain on specialty bed for wound 

management. Encouraged increase food intake. Start on oral supplementations.

## 2020-07-26 NOTE — PROGRESS NOTE
Provider Note


Provider Note: 





Status post debridement of ischial decubitus ulcers.  The ulcers are worsening. 

Skin, fatty tissue, and muscle were debrided away sharply.  The patient has 

stage IV ulcers bilaterally.  Patient will require an aggressive turning 

regimen, otherwise they will continue to worsen.  Continue with damp to dry 

dressing changes twice daily.  Surgery will sign off at this time.  Please 

renotify with any questions or concerns.

## 2020-07-26 NOTE — OPERATIVE REPORT
Nonrecallable Operative Report


DATE OF SURGERY: 07/26/20


PREOPERATIVE DIAGNOSIS: Lateral ischial decubiti, with necrotic skin and tissue 

present.


POSTOPERATIVE DIAGNOSIS: Stage IV bilateral ischial decubiti


OPERATION: 1.  Sharp, excisional debridement of skin, fat, and muscle of left 

ischial decubitus (2 cm x 2 cm).  2.  Sharp, excisional debridement of skin, 

fat, and muscle of right ischial decubitus (5 cm x 7 cm).


SURGEON: KELLEY PERRIN


ANESTHESIA: Local - 1% lidocaine with epinephrine


TISSUE REMOVED OR ALTERED: Skin, fatty tissue, and muscle of bilateral ischial 

decubiti.


COMPLICATIONS: 





None apparent


ESTIMATED BLOOD LOSS: Minimal


PROCEDURE: 





Procedure in detail: After informed consent was obtained from the patient, she 

was rolled into the right lateral decubitus position.  1% lidocaine with 

epinephrine was used to infiltrate the skin.  The left ischial decubitus ulcer 

was debrided.  Skin, fatty tissue, and muscle was debrided away sharply using a 

scalpel and scissors.  Once all nonviable tissue was removed, a dressing was 

placed, the patient was rolled to the left lateral decubitus position, and a

ttention was turned to the right ischial decubitus.





The right ischial decubitus ulcer was much larger.  Again, 1% lidocaine with 

epinephrine was used to infiltrate the skin.  Using a scalpel and scissors, 

sharp, excisional debridement was performed.  Skin, fatty tissue, and nonviable 

muscle was excised from the patient.  Once this was completed, a dressing was 

placed, and the procedure was concluded.  All sponge, instrument, and needle 

counts were correct x2.





Condition: Fair.

## 2020-07-27 LAB — VANCOMYCIN,TROUGH: 25.5 UG/ML (ref 5–20)

## 2020-07-27 RX ADMIN — Medication SCH MG: at 09:48

## 2020-07-27 RX ADMIN — Medication SCH UNIT: at 14:06

## 2020-07-27 RX ADMIN — Medication SCH UNIT: at 05:42

## 2020-07-27 RX ADMIN — VANCOMYCIN HYDROCHLORIDE SCH MLS/HR: 1 INJECTION, POWDER, LYOPHILIZED, FOR SOLUTION INTRAVENOUS at 09:53

## 2020-07-27 RX ADMIN — VITAMIN E CAP 400 UNIT SCH UNIT: 400 CAP at 09:48

## 2020-07-27 RX ADMIN — Medication PRN UNIT: at 14:06

## 2020-07-27 RX ADMIN — OXYCODONE HYDROCHLORIDE AND ACETAMINOPHEN SCH MG: 500 TABLET ORAL at 17:24

## 2020-07-27 RX ADMIN — VANCOMYCIN HYDROCHLORIDE SCH MLS/HR: 1 INJECTION, POWDER, LYOPHILIZED, FOR SOLUTION INTRAVENOUS at 23:03

## 2020-07-27 RX ADMIN — Medication SCH UNIT: at 23:04

## 2020-07-27 RX ADMIN — VANCOMYCIN HYDROCHLORIDE SCH MLS/HR: 1 INJECTION, POWDER, LYOPHILIZED, FOR SOLUTION INTRAVENOUS at 00:14

## 2020-07-27 RX ADMIN — Medication SCH UNIT: at 09:48

## 2020-07-27 RX ADMIN — Medication SCH UNIT: at 00:13

## 2020-07-27 RX ADMIN — LEVOFLOXACIN SCH MLS/HR: 500 INJECTION, SOLUTION INTRAVENOUS at 09:48

## 2020-07-27 RX ADMIN — ENOXAPARIN SODIUM SCH MG: 40 INJECTION SUBCUTANEOUS at 09:49

## 2020-07-27 RX ADMIN — OXYCODONE HYDROCHLORIDE AND ACETAMINOPHEN SCH MG: 500 TABLET ORAL at 09:48

## 2020-07-27 RX ADMIN — PANTOPRAZOLE SODIUM SCH: 40 TABLET, DELAYED RELEASE ORAL at 05:43

## 2020-07-28 RX ADMIN — METOPROLOL TARTRATE SCH MG: 25 TABLET, FILM COATED ORAL at 22:54

## 2020-07-28 RX ADMIN — Medication SCH UNIT: at 13:46

## 2020-07-28 RX ADMIN — VANCOMYCIN HYDROCHLORIDE SCH MLS/HR: 1 INJECTION, POWDER, LYOPHILIZED, FOR SOLUTION INTRAVENOUS at 11:30

## 2020-07-28 RX ADMIN — PANTOPRAZOLE SODIUM SCH MG: 40 TABLET, DELAYED RELEASE ORAL at 06:26

## 2020-07-28 RX ADMIN — Medication SCH UNIT: at 10:42

## 2020-07-28 RX ADMIN — Medication SCH UNIT: at 06:26

## 2020-07-28 RX ADMIN — VITAMIN E CAP 400 UNIT SCH UNIT: 400 CAP at 10:42

## 2020-07-28 RX ADMIN — VANCOMYCIN HYDROCHLORIDE SCH MLS/HR: 1 INJECTION, POWDER, LYOPHILIZED, FOR SOLUTION INTRAVENOUS at 22:53

## 2020-07-28 RX ADMIN — OXYCODONE HYDROCHLORIDE AND ACETAMINOPHEN SCH MG: 500 TABLET ORAL at 10:40

## 2020-07-28 RX ADMIN — LEVOFLOXACIN SCH MLS/HR: 500 INJECTION, SOLUTION INTRAVENOUS at 10:41

## 2020-07-28 RX ADMIN — ENOXAPARIN SODIUM SCH MG: 40 INJECTION SUBCUTANEOUS at 10:40

## 2020-07-28 RX ADMIN — Medication SCH MG: at 10:42

## 2020-07-28 RX ADMIN — OXYCODONE HYDROCHLORIDE AND ACETAMINOPHEN SCH MG: 500 TABLET ORAL at 17:50

## 2020-07-28 RX ADMIN — METOPROLOL TARTRATE SCH MG: 25 TABLET, FILM COATED ORAL at 10:40

## 2020-07-28 RX ADMIN — Medication SCH UNIT: at 22:54

## 2020-07-28 NOTE — PDOC PROGRESS REPORT
Subjective


Progress Note for:: 07/28/20


Subjective:: 





No reported fever or chills. No chest pain or difficulty with her breathing. Her

bilateral ischial pressure ulcer and poor food intake remain a challenge and 

detrimental to her prognosis as well as risk of readmission.


Reason For Visit: 


ACUTE RESPIRATORY DISTRESS,SEPSIS,URINARY TRACT








Physical Exam


Vital Signs: 


                                        











Temp Pulse Resp BP Pulse Ox


 


 98.2 F   108 H  20   122/56 L  97 


 


 07/28/20 03:14  07/28/20 03:14  07/28/20 03:14  07/28/20 03:14  07/28/20 03:14








                                 Intake & Output











 07/27/20 07/28/20 07/29/20





 06:59 06:59 06:59


 


Intake Total 710 660 


 


Output Total 1600 875 


 


Balance -890 -215 


 


Weight 77.9 kg 77.8 kg 











Physical Exam: 





General appearance: PRESENT: no acute distress


Head exam: PRESENT: atraumatic, normocephalic


Eye exam: PRESENT: conjunctiva pink.  ABSENT: pallor, scleral icterus


Mouth exam: PRESENT: moist


Respiratory exam: PRESENT: clear to auscultation elver, decreased breath sounds - 

at lung bases


Cardiovascular exam: PRESENT: RRR, +S1, +S2.  ABSENT: diastolic murmur, rubs, 

systolic murmur


GI/Abdominal exam: PRESENT: normal bowel sounds, soft.  ABSENT: distended, 

guarding, mass, organomegaly, rebound, tenderness


Neurological exam: PRESENT: alert, awake.  ABSENT: motor sensory deficit


Psychiatric exam: PRESENT: appropriate affect, normal mood.  ABSENT: homicidal 

ideation, suicidal ideation


Skin exam: PRESENT: dry, warm, other - stage 4 bilateral ischial pressure ulcers





Results


Laboratory Results: 


                                        





                                 07/26/20 08:00 





                                 07/27/20 20:10 





                                        











  07/27/20





  20:10


 


Creatinine  0.53


 


Est GFR ( Amer)  > 60








                                        





07/25/20 11:30   Blood   Blood Culture (PCR) - Final


                            Staphylococcus Species





                                        











  07/25/20 07/25/20





  08:45 08:45


 


Creatine Kinase  30 


 


Troponin I   0.050


 


NT-Pro-B Natriuret Pep   6640 H











Impressions: 


                                        





Chest X-Ray  07/25/20 08:59


IMPRESSION:  NO ACUTE RADIOGRAPHIC FINDING IN THE CHEST.


 














Assessment & Plan





- Diagnosis


(1) Acute respiratory distress


Is this a current diagnosis for this admission?: Yes   





(2) Urinary tract infection


Qualifiers: 


 


Is this a current diagnosis for this admission?: Yes   





(3) Sepsis


Qualifiers: 


   Sepsis type: sepsis due to unspecified organism   Sepsis acute organ 

dysfunction status: with acute organ dysfunction   Severe sepsis acute organ 

dysfunction type: acute respiratory failure   Acute respiratory failure type: 

with hypercapnia   Severe sepsis shock status: unspecified   Qualified Code(s): 

A41.9 - Sepsis, unspecified organism; R65.20 - Severe sepsis without septic 

shock; J96.02 - Acute respiratory failure with hypercapnia   


Is this a current diagnosis for this admission?: Yes   





(4) Staghorn kidney stones


Is this a current diagnosis for this admission?: Yes   





(5) Hydronephrosis of left kidney


Is this a current diagnosis for this admission?: Yes   





(6) Multiple sclerosis, primary chronic progressive


Is this a current diagnosis for this admission?: Yes   





(7) Pressure ulcer of ischial area, stage 4


Qualifiers: 


   Laterality: unspecified laterality   Qualified Code(s): L89.304 - Pressure 

ulcer of unspecified buttock, stage 4   


Is this a current diagnosis for this admission?: Yes   





- Time


Time Spent with patient: 25-34 minutes


Level of Care: IMCU


Medications reviewed and adjusted accordingly: Yes


Anticipated discharge: Home with Homehealth, SNF


Anticipated DC Timeframe: Other





- Inpatient Certification


Based on my medical assessment, after consideration of the patient's 

comorbidities, presenting symptoms, or acuity I expect that the services needed 

warrant INPATIENT care.: Yes


I certify that my determination is in accordance with my understanding of 

Medicare's requirements for reasonable and necessary INPATIENT services [42 CFR 

412.3e].: Yes


Medical Necessity: Significant Comorbidiites Make Outpatient Treatment Too 

Risky, Need Close Monitoring Due to Risk of Patient Decompensation, Need For IV 

Fluids, Need For Continuous Telemetry Monitoring, Need for IV Antibiotics, Risk 

of Complication if Not Cared For in Hospital, Risk of Diagnosis Which Will 

Require Inpatient Eval/Care/Monitoring


Post Hospital Care: D/C Planner Documentation





- Plan Summary


Plan Summary: 





Continue current medication and antibiotic coverage. Emphasized need for bed 

rotation and wound care.

## 2020-07-28 NOTE — PDOC PROGRESS REPORT
Subjective


Progress Note for:: 07/27/20


Subjective:: 





No reported fever or chills. No chest pain or difficulty with her breathing. P.O

intake remain a challenge.


Reason For Visit: 


ACUTE RESPIRATORY DISTRESS,SEPSIS,URINARY TRACT








Physical Exam


Vital Signs: 


                                        











Temp Pulse Resp BP Pulse Ox


 


 98.0 F   105 H  22 H  139/68 H  100 


 


 07/27/20 16:49  07/27/20 16:49  07/27/20 16:49  07/27/20 16:49  07/27/20 16:49








                                 Intake & Output











 07/26/20 07/27/20 07/28/20





 06:59 06:59 06:59


 


Intake Total 3401 710 410


 


Output Total 2395 1600 


 


Balance 1006 -890 410


 


Weight 79.9 kg 77.9 kg 77.9 kg











Physical Exam: 





General appearance: PRESENT: no acute distress


Head exam: PRESENT: atraumatic, normocephalic


Eye exam: PRESENT: conjunctiva pink.  ABSENT: pallor, scleral icterus


Mouth exam: PRESENT: moist


Respiratory exam: PRESENT: clear to auscultation elver, decreased breath sounds - 

at lung bases


Cardiovascular exam: PRESENT: RRR, +S1, +S2.  ABSENT: diastolic murmur, rubs, 

systolic murmur


GI/Abdominal exam: PRESENT: normal bowel sounds, soft.  ABSENT: distended, 

guarding, mass, organomegaly, rebound, tenderness


Neurological exam: PRESENT: alert, awake.  ABSENT: motor sensory deficit


Psychiatric exam: PRESENT: appropriate affect, normal mood.  ABSENT: homicidal 

ideation, suicidal ideation


Skin exam: PRESENT: dry, warm, other - stage 4 bilateral ischial pressure ulcers





Results


Laboratory Results: 


                                        





                                 07/26/20 08:00 





                                 07/26/20 06:25 





                                        





07/25/20 11:30   Blood   Blood Culture (PCR) - Final


                            Staphylococcus Species





                                        











  07/25/20 07/25/20





  08:45 08:45


 


Creatine Kinase  30 


 


Troponin I   0.050


 


NT-Pro-B Natriuret Pep   6640 H











Impressions: 


                                        





Chest X-Ray  07/25/20 08:59


IMPRESSION:  NO ACUTE RADIOGRAPHIC FINDING IN THE CHEST.


 














Assessment & Plan





- Diagnosis


(1) Acute respiratory distress


Is this a current diagnosis for this admission?: Yes   





(2) Urinary tract infection


Qualifiers: 


 


Is this a current diagnosis for this admission?: Yes   





(3) Sepsis


Qualifiers: 


   Sepsis type: sepsis due to unspecified organism   Sepsis acute organ 

dysfunction status: with acute organ dysfunction   Severe sepsis acute organ 

dysfunction type: acute respiratory failure   Acute respiratory failure type: 

with hypercapnia   Severe sepsis shock status: unspecified   Qualified Code(s): 

A41.9 - Sepsis, unspecified organism; R65.20 - Severe sepsis without septic 

shock; J96.02 - Acute respiratory failure with hypercapnia   


Is this a current diagnosis for this admission?: Yes   





(4) Staghorn kidney stones


Is this a current diagnosis for this admission?: Yes   





(5) Hydronephrosis of left kidney


Is this a current diagnosis for this admission?: Yes   





(6) Multiple sclerosis, primary chronic progressive


Is this a current diagnosis for this admission?: Yes   





(7) Pressure ulcer of ischial area, stage 4


Qualifiers: 


   Laterality: unspecified laterality   Qualified Code(s): L89.304 - Pressure 

ulcer of unspecified buttock, stage 4   


Is this a current diagnosis for this admission?: Yes   





- Time


Time Spent with patient: 25-34 minutes


Level of Care: IMCU


Medications reviewed and adjusted accordingly: Yes


Anticipated discharge: Home with Homehealth, SNF


Anticipated DC Timeframe: Other





- Inpatient Certification


Based on my medical assessment, after consideration of the patient's 

comorbidities, presenting symptoms, or acuity I expect that the services needed 

warrant INPATIENT care.: Yes


I certify that my determination is in accordance with my understanding of 

Medicare's requirements for reasonable and necessary INPATIENT services [42 CFR 

412.3e].: Yes


Medical Necessity: Significant Comorbidiites Make Outpatient Treatment Too 

Risky, Need Close Monitoring Due to Risk of Patient Decompensation, Need For IV 

Fluids, Need For Continuous Telemetry Monitoring, Need for IV Antibiotics, Risk 

of Complication if Not Cared For in Hospital, Risk of Diagnosis Which Will Req

uire Inpatient Eval/Care/Monitoring


Post Hospital Care: D/C Planner Documentation, D/C or Transfer Summary





- Plan Summary


Plan Summary: 





Continue current medication management. Follow up on cultured organism 

identification and sensitivity findings.

## 2020-07-29 LAB — VANCOMYCIN,TROUGH: 27.9 UG/ML (ref 5–20)

## 2020-07-29 RX ADMIN — LEVOFLOXACIN SCH MLS/HR: 500 INJECTION, SOLUTION INTRAVENOUS at 11:22

## 2020-07-29 RX ADMIN — OXYCODONE AND ACETAMINOPHEN PRN TAB: 5; 325 TABLET ORAL at 02:20

## 2020-07-29 RX ADMIN — Medication SCH UNIT: at 21:42

## 2020-07-29 RX ADMIN — Medication SCH MG: at 12:19

## 2020-07-29 RX ADMIN — ENOXAPARIN SODIUM SCH MG: 40 INJECTION SUBCUTANEOUS at 12:19

## 2020-07-29 RX ADMIN — OXYCODONE HYDROCHLORIDE AND ACETAMINOPHEN SCH MG: 500 TABLET ORAL at 18:50

## 2020-07-29 RX ADMIN — METOPROLOL TARTRATE SCH MG: 25 TABLET, FILM COATED ORAL at 21:42

## 2020-07-29 RX ADMIN — METOPROLOL TARTRATE SCH MG: 25 TABLET, FILM COATED ORAL at 12:19

## 2020-07-29 RX ADMIN — OXYCODONE AND ACETAMINOPHEN PRN TAB: 5; 325 TABLET ORAL at 12:20

## 2020-07-29 RX ADMIN — Medication SCH UNIT: at 14:30

## 2020-07-29 RX ADMIN — Medication PRN UNIT: at 14:30

## 2020-07-29 RX ADMIN — VITAMIN E CAP 400 UNIT SCH UNIT: 400 CAP at 12:20

## 2020-07-29 RX ADMIN — VANCOMYCIN HYDROCHLORIDE SCH MLS/HR: 1 INJECTION, POWDER, LYOPHILIZED, FOR SOLUTION INTRAVENOUS at 21:43

## 2020-07-29 RX ADMIN — PANTOPRAZOLE SODIUM SCH MG: 40 TABLET, DELAYED RELEASE ORAL at 06:38

## 2020-07-29 RX ADMIN — Medication SCH UNIT: at 12:19

## 2020-07-29 RX ADMIN — Medication SCH UNIT: at 06:39

## 2020-07-29 RX ADMIN — VANCOMYCIN HYDROCHLORIDE SCH MLS/HR: 1 INJECTION, POWDER, LYOPHILIZED, FOR SOLUTION INTRAVENOUS at 11:23

## 2020-07-29 RX ADMIN — OXYCODONE HYDROCHLORIDE AND ACETAMINOPHEN SCH MG: 500 TABLET ORAL at 12:19

## 2020-07-29 NOTE — PDOC PROGRESS REPORT
Subjective


Progress Note for:: 07/29/20


Subjective:: 





No reported fever or chills. No chest pain or difficulty with her breathing. Her

oral intake is encouraging.


Reason For Visit: 


ACUTE RESPIRATORY DISTRESS,SEPSIS,URINARY TRACT








Physical Exam


Vital Signs: 


                                        











Temp Pulse Resp BP Pulse Ox


 


 98.3 F   108 H  24 H  115/58 L  99 


 


 07/29/20 03:22  07/29/20 03:22  07/29/20 03:22  07/29/20 03:22  07/29/20 03:22








                                 Intake & Output











 07/28/20 07/29/20 07/30/20





 06:59 06:59 06:59


 


Intake Total 660 990 


 


Output Total 875 1125 


 


Balance -215 -135 


 


Weight 77.8 kg 76.3 kg 











Physical Exam: 





General appearance: PRESENT: no acute distress


Head exam: PRESENT: atraumatic, normocephalic


Eye exam: PRESENT: conjunctiva pink.  ABSENT: pallor, scleral icterus


Mouth exam: PRESENT: moist


Respiratory exam: PRESENT: clear to auscultation elver, decreased breath sounds - 

at lung bases


Cardiovascular exam: PRESENT: RRR, +S1, +S2.  ABSENT: diastolic murmur, rubs, 

systolic murmur


GI/Abdominal exam: PRESENT: normal bowel sounds, soft.  ABSENT: distended, 

guarding, mass, organomegaly, rebound, tenderness


Neurological exam: PRESENT: alert, awake.  ABSENT: motor sensory deficit


Psychiatric exam: PRESENT: appropriate affect, normal mood.  ABSENT: homicidal 

ideation, suicidal ideation


Skin exam: PRESENT: dry, warm, other - stage 4 bilateral ischial pressure ulcers





Results


Laboratory Results: 


                                        





                                 07/26/20 08:00 





                                 07/27/20 20:10 





                                        





07/25/20 11:30   Blood   Blood Culture (PCR) - Final


                            Staphylococcus Species


07/25/20 11:30   Blood   Blood Culture - Final


                            Staphylococcus Epidermidis


07/25/20 08:45   Catheterized Urine   Urine Culture - Final


                            NO GROWTH 2 DAYS





                                        











  07/25/20 07/25/20





  08:45 08:45


 


Creatine Kinase  30 


 


Troponin I   0.050


 


NT-Pro-B Natriuret Pep   6640 H











Impressions: 


                                        





Chest X-Ray  07/25/20 08:59


IMPRESSION:  NO ACUTE RADIOGRAPHIC FINDING IN THE CHEST.


 














Assessment & Plan





- Diagnosis


(1) Acute respiratory distress


Is this a current diagnosis for this admission?: Yes   





(2) Urinary tract infection


Qualifiers: 


 


Is this a current diagnosis for this admission?: Yes   





(3) Sepsis


Qualifiers: 


   Sepsis type: sepsis due to unspecified organism   Sepsis acute organ dysf

unction status: with acute organ dysfunction   Severe sepsis acute organ dy

sfunction type: acute respiratory failure   Acute respiratory failure type: with

hypercapnia   Severe sepsis shock status: unspecified   Qualified Code(s): A41.9

- Sepsis, unspecified organism; R65.20 - Severe sepsis without septic shock; 

J96.02 - Acute respiratory failure with hypercapnia   


Is this a current diagnosis for this admission?: Yes   





(4) Staghorn kidney stones


Is this a current diagnosis for this admission?: Yes   





(5) Hydronephrosis of left kidney


Is this a current diagnosis for this admission?: Yes   





(6) Multiple sclerosis, primary chronic progressive


Is this a current diagnosis for this admission?: Yes   





(7) Pressure ulcer of ischial area, stage 4


Qualifiers: 


   Laterality: unspecified laterality   Qualified Code(s): L89.304 - Pressure 

ulcer of unspecified buttock, stage 4   


Is this a current diagnosis for this admission?: Yes   





- Time


Time Spent with patient: 25-34 minutes


Level of Care: IMCU


Medications reviewed and adjusted accordingly: Yes


Anticipated discharge: Home with Homehealth, SNF


Anticipated DC Timeframe: Other





- Inpatient Certification


Based on my medical assessment, after consideration of the patient's 

comorbidities, presenting symptoms, or acuity I expect that the services needed 

warrant INPATIENT care.: Yes


I certify that my determination is in accordance with my understanding of 

Medicare's requirements for reasonable and necessary INPATIENT services [42 CFR 

412.3e].: Yes


Medical Necessity: Significant Comorbidiites Make Outpatient Treatment Too 

Risky, Need Close Monitoring Due to Risk of Patient Decompensation, Need For IV 

Fluids, Need For Continuous Telemetry Monitoring, Need for IV Antibiotics, Risk 

of Complication if Not Cared For in Hospital, Risk of Diagnosis Which Will 

Require Inpatient Eval/Care/Monitoring


Post Hospital Care: D/C Planner Documentation, D/C or Transfer Summary





- Plan Summary


Plan Summary: 





Continue current medication management. Follow up with the family regarding 

disposition issues.

## 2020-07-30 RX ADMIN — Medication PRN UNIT: at 13:36

## 2020-07-30 RX ADMIN — Medication SCH MG: at 09:47

## 2020-07-30 RX ADMIN — DOXYCYCLINE HYCLATE SCH MG: 100 TABLET ORAL at 21:48

## 2020-07-30 RX ADMIN — Medication SCH UNIT: at 09:48

## 2020-07-30 RX ADMIN — OXYCODONE HYDROCHLORIDE AND ACETAMINOPHEN SCH MG: 500 TABLET ORAL at 17:54

## 2020-07-30 RX ADMIN — OXYCODONE AND ACETAMINOPHEN PRN TAB: 5; 325 TABLET ORAL at 16:40

## 2020-07-30 RX ADMIN — LEVOFLOXACIN SCH MLS/HR: 500 INJECTION, SOLUTION INTRAVENOUS at 09:46

## 2020-07-30 RX ADMIN — VITAMIN E CAP 400 UNIT SCH UNIT: 400 CAP at 09:47

## 2020-07-30 RX ADMIN — METOPROLOL TARTRATE SCH MG: 25 TABLET, FILM COATED ORAL at 09:47

## 2020-07-30 RX ADMIN — OXYCODONE AND ACETAMINOPHEN PRN TAB: 5; 325 TABLET ORAL at 04:27

## 2020-07-30 RX ADMIN — PANTOPRAZOLE SODIUM SCH MG: 40 TABLET, DELAYED RELEASE ORAL at 05:37

## 2020-07-30 RX ADMIN — Medication SCH UNIT: at 21:48

## 2020-07-30 RX ADMIN — Medication SCH UNIT: at 05:36

## 2020-07-30 RX ADMIN — METOPROLOL TARTRATE SCH MG: 25 TABLET, FILM COATED ORAL at 21:48

## 2020-07-30 RX ADMIN — DOXYCYCLINE HYCLATE SCH MG: 100 TABLET ORAL at 15:06

## 2020-07-30 RX ADMIN — ENOXAPARIN SODIUM SCH MG: 40 INJECTION SUBCUTANEOUS at 09:47

## 2020-07-30 RX ADMIN — Medication SCH UNIT: at 13:36

## 2020-07-30 RX ADMIN — OXYCODONE HYDROCHLORIDE AND ACETAMINOPHEN SCH MG: 500 TABLET ORAL at 09:47

## 2020-07-30 NOTE — PDOC PROGRESS REPORT
Subjective


Progress Note for:: 07/30/20


Subjective:: 





No reported fever or chills. No chest pain or difficulty with her breathing. 

There is reported expressed pain with motion and wound care period. Her oral 

intake is encouraging.


Reason For Visit: 


ACUTE RESPIRATORY DISTRESS,SEPSIS,URINARY TRACT








Physical Exam


Vital Signs: 


                                        











Temp Pulse Resp BP Pulse Ox


 


 97.5 F   117 H  20   105/64   97 


 


 07/30/20 15:14  07/30/20 15:14  07/30/20 15:14  07/30/20 15:14  07/30/20 15:14








                                 Intake & Output











 07/29/20 07/30/20 07/31/20





 06:59 06:59 06:59


 


Intake Total 990 880 150


 


Output Total 1125 1750 275


 


Balance -135 -870 -125


 


Weight 76.3 kg 77.1 kg 











Physical Exam: 





General appearance: PRESENT: no acute distress


Head exam: PRESENT: atraumatic, normocephalic


Eye exam: PRESENT: conjunctiva pink.  ABSENT: pallor, scleral icterus


Mouth exam: PRESENT: moist


Respiratory exam: PRESENT: clear to auscultation elver, decreased breath sounds - 

at lung bases


Cardiovascular exam: PRESENT: RRR, +S1, +S2.  ABSENT: diastolic murmur, rubs, 

systolic murmur


GI/Abdominal exam: PRESENT: normal bowel sounds, soft.  ABSENT: distended, 

guarding, mass, organomegaly, rebound, tenderness


Neurological exam: PRESENT: alert, awake.  ABSENT: motor sensory deficit


Psychiatric exam: PRESENT: appropriate affect, normal mood.  ABSENT: homicidal 

ideation, suicidal ideation


Skin exam: PRESENT: dry, warm, other - stage 4 bilateral ischial pressure ulcers








Results


Laboratory Results: 


                                        





                                 07/26/20 08:00 





                                 07/29/20 21:05 





                                        











  07/29/20





  21:05


 


Creatinine  0.58


 


Est GFR ( Amer)  > 60








                                        





07/25/20 08:45   Blood   Blood Culture - Final


                            NO GROWTH IN 5 DAYS





                                        











  07/25/20 07/25/20





  08:45 08:45


 


Creatine Kinase  30 


 


Troponin I   0.050


 


NT-Pro-B Natriuret Pep   6640 H











Impressions: 


                                        





Chest X-Ray  07/25/20 08:59


IMPRESSION:  NO ACUTE RADIOGRAPHIC FINDING IN THE CHEST.


 














Assessment & Plan





- Diagnosis


(1) Acute respiratory distress


Is this a current diagnosis for this admission?: Yes   





(2) Urinary tract infection


Qualifiers: 


 


Is this a current diagnosis for this admission?: Yes   





(3) Sepsis


Qualifiers: 


   Sepsis type: sepsis due to unspecified organism   Sepsis acute organ 

dysfunction status: with acute organ dysfunction   Severe sepsis acute organ 

dysfunction type: acute respiratory failure   Acute respiratory failure type: 

with hypercapnia   Severe sepsis shock status: unspecified   Qualified Code(s): 

A41.9 - Sepsis, unspecified organism; R65.20 - Severe sepsis without septic 

shock; J96.02 - Acute respiratory failure with hypercapnia   


Is this a current diagnosis for this admission?: Yes   





(4) Staghorn kidney stones


Is this a current diagnosis for this admission?: Yes   





(5) Hydronephrosis of left kidney


Is this a current diagnosis for this admission?: Yes   





(6) Multiple sclerosis, primary chronic progressive


Is this a current diagnosis for this admission?: Yes   





(7) Pressure ulcer of ischial area, stage 4


Qualifiers: 


   Laterality: unspecified laterality   Qualified Code(s): L89.304 - Pressure 

ulcer of unspecified buttock, stage 4   


Is this a current diagnosis for this admission?: Yes   





- Time


Time Spent with patient: 25-34 minutes


Level of Care: IMCU


Medications reviewed and adjusted accordingly: Yes


Anticipated discharge: Home with Homehealth, SNF


Anticipated DC Timeframe: Other





- Inpatient Certification


Based on my medical assessment, after consideration of the patient's 

comorbidities, presenting symptoms, or acuity I expect that the services needed 

warrant INPATIENT care.: Yes


I certify that my determination is in accordance with my understanding of 

Medicare's requirements for reasonable and necessary INPATIENT services [42 CFR 

412.3e].: Yes


Medical Necessity: Significant Comorbidiites Make Outpatient Treatment Too 

Risky, Need Close Monitoring Due to Risk of Patient Decompensation, Need For IV 

Fluids, Need For Continuous Telemetry Monitoring, Need for Pain Control, Need 

for IV Antibiotics, Risk of Complication if Not Cared For in Hospital, Risk of 

Diagnosis Which Will Require Inpatient Eval/Care/Monitoring


Post Hospital Care: D/C Planner Documentation, D/C or Transfer Summary





- Plan Summary


Plan Summary: 





Continue current antibiotic therapy. Increase Percocet to 5/325 mg p.o q 6 hours

prn for pain management. Use diluted betadine solution as base contact with her 

wound to decrease bacteria load. Continue other supplemental therapy to aide 

wound healing.

## 2020-07-31 LAB — VANCOMYCIN,TROUGH: 14.6 UG/ML (ref 5–20)

## 2020-07-31 RX ADMIN — ENOXAPARIN SODIUM SCH MG: 40 INJECTION SUBCUTANEOUS at 09:44

## 2020-07-31 RX ADMIN — Medication SCH MG: at 09:45

## 2020-07-31 RX ADMIN — FOLIC ACID SCH MLS/HR: 5 INJECTION, SOLUTION INTRAMUSCULAR; INTRAVENOUS; SUBCUTANEOUS at 17:44

## 2020-07-31 RX ADMIN — DOXYCYCLINE HYCLATE SCH MG: 100 TABLET ORAL at 09:45

## 2020-07-31 RX ADMIN — METOPROLOL TARTRATE SCH MG: 25 TABLET, FILM COATED ORAL at 09:45

## 2020-07-31 RX ADMIN — FAMOTIDINE SCH MG: 10 INJECTION INTRAVENOUS at 22:23

## 2020-07-31 RX ADMIN — Medication SCH UNIT: at 05:40

## 2020-07-31 RX ADMIN — OXYCODONE AND ACETAMINOPHEN PRN TAB: 5; 325 TABLET ORAL at 00:41

## 2020-07-31 RX ADMIN — MORPHINE SULFATE PRN MG: 10 INJECTION INTRAMUSCULAR; INTRAVENOUS; SUBCUTANEOUS at 22:23

## 2020-07-31 RX ADMIN — OXYCODONE HYDROCHLORIDE AND ACETAMINOPHEN SCH MG: 500 TABLET ORAL at 09:45

## 2020-07-31 RX ADMIN — DOXYCYCLINE SCH MLS/HR: 100 INJECTION, POWDER, LYOPHILIZED, FOR SOLUTION INTRAVENOUS at 22:26

## 2020-07-31 RX ADMIN — MORPHINE SULFATE PRN MG: 10 INJECTION INTRAMUSCULAR; INTRAVENOUS; SUBCUTANEOUS at 16:17

## 2020-07-31 RX ADMIN — Medication SCH UNIT: at 09:45

## 2020-07-31 RX ADMIN — VITAMIN E CAP 400 UNIT SCH UNIT: 400 CAP at 09:45

## 2020-07-31 RX ADMIN — Medication SCH UNIT: at 14:35

## 2020-07-31 RX ADMIN — PANTOPRAZOLE SODIUM SCH MG: 40 TABLET, DELAYED RELEASE ORAL at 05:37

## 2020-07-31 RX ADMIN — OXYCODONE AND ACETAMINOPHEN PRN TAB: 5; 325 TABLET ORAL at 09:44

## 2020-07-31 RX ADMIN — Medication SCH UNIT: at 22:25

## 2020-07-31 NOTE — PDOC PROGRESS REPORT
Subjective


Progress Note for:: 07/31/20


Subjective:: 


Patient had another episode of aspiration this morning with even her medication 

administration. No reported fever or chills. No chest pain or difficulty with 

her breathing. 


Reason For Visit: 


ACUTE RESPIRATORY DISTRESS,SEPSIS,URINARY TRACT








Physical Exam


Vital Signs: 


                                        











Temp Pulse Resp BP Pulse Ox


 


 98.1 F   127 H  20   113/51 L  95 


 


 07/31/20 11:18  07/31/20 11:18  07/31/20 11:18  07/31/20 11:18  07/31/20 11:18








                                 Intake & Output











 07/30/20 07/31/20 08/01/20





 06:59 06:59 06:59


 


Intake Total 880 240 100


 


Output Total 1750 750 50


 


Balance -870 -510 50


 


Weight 77.1 kg 75.5 kg 











Physical Exam: 





General appearance: PRESENT: no acute distress


Head exam: PRESENT: atraumatic, normocephalic


Eye exam: PRESENT: conjunctiva pink.  ABSENT: pallor, scleral icterus


Mouth exam: PRESENT: moist


Respiratory exam: PRESENT: clear to auscultation elver, decreased breath sounds - 

at lung bases


Cardiovascular exam: PRESENT: RRR, +S1, +S2.  ABSENT: diastolic murmur, rubs, 

systolic murmur


GI/Abdominal exam: PRESENT: normal bowel sounds, soft.  ABSENT: distended, 

guarding, mass, organomegaly, rebound, tenderness


Neurological exam: PRESENT: alert, awake.  ABSENT: motor sensory deficit


Psychiatric exam: PRESENT: appropriate affect, normal mood.  ABSENT: homicidal 

ideation, suicidal ideation


Skin exam: PRESENT: dry, warm, other - stage 4 bilateral ischial pressure ulcers











Results


Laboratory Results: 


                                        





                                 07/26/20 08:00 





                                 07/31/20 10:20 





                                        











  07/31/20





  10:20


 


Creatinine  0.51 L


 


Est GFR ( Amer)  > 60








                                        











  07/25/20 07/25/20





  08:45 08:45


 


Creatine Kinase  30 


 


Troponin I   0.050


 


NT-Pro-B Natriuret Pep   6640 H











Impressions: 


                                        





Chest X-Ray  07/25/20 08:59


IMPRESSION:  NO ACUTE RADIOGRAPHIC FINDING IN THE CHEST.


 














Assessment & Plan





- Diagnosis


(1) Acute respiratory distress


Is this a current diagnosis for this admission?: Yes   





(2) Urinary tract infection


Qualifiers: 


 


Is this a current diagnosis for this admission?: Yes   





(3) Sepsis


Qualifiers: 


   Sepsis type: sepsis due to unspecified organism   Sepsis acute organ 

dysfunction status: with acute organ dysfunction   Severe sepsis acute organ 

dysfunction type: acute respiratory failure   Acute respiratory failure type: 

with hypercapnia   Severe sepsis shock status: unspecified   Qualified Code(s): 

A41.9 - Sepsis, unspecified organism; R65.20 - Severe sepsis without septic 

shock; J96.02 - Acute respiratory failure with hypercapnia   


Is this a current diagnosis for this admission?: Yes   





(4) Staghorn kidney stones


Is this a current diagnosis for this admission?: Yes   





(5) Hydronephrosis of left kidney


Is this a current diagnosis for this admission?: Yes   





(6) Multiple sclerosis, primary chronic progressive


Is this a current diagnosis for this admission?: Yes   





(7) Pressure ulcer of ischial area, stage 4


Qualifiers: 


   Laterality: unspecified laterality   Qualified Code(s): L89.304 - Pressure 

ulcer of unspecified buttock, stage 4   


Is this a current diagnosis for this admission?: Yes   





- Time


Time Spent with patient: 25-34 minutes


Level of Care: IMCU


Medications reviewed and adjusted accordingly: Yes


Anticipated discharge: Home with Homehealth, SNF, Hospice


Anticipated DC Timeframe: Other





- Inpatient Certification


Based on my medical assessment, after consideration of the patient's 

comorbidities, presenting symptoms, or acuity I expect that the services needed 

warrant INPATIENT care.: Yes


I certify that my determination is in accordance with my understanding of 

Medicare's requirements for reasonable and necessary INPATIENT services [42 CFR 

412.3e].: Yes


Medical Necessity: Significant Comorbidiites Make Outpatient Treatment Too 

Risky, Need Close Monitoring Due to Risk of Patient Decompensation, Need For IV 

Fluids, Need For Continuous Telemetry Monitoring, Need for IV Antibiotics, Risk 

of Complication if Not Cared For in Hospital, Risk of Diagnosis Which Will 

Require Inpatient Eval/Care/Monitoring


Post Hospital Care: D/C Planner Documentation, D/C or Transfer Summary





- Plan Summary


Plan Summary: 





I had extensive discussion with patient and daughter regarding moving forward 

with her care plan in view of her recurrent aspiration and persistent risk a 

well as her poor nutritional status and decubitus ulcers. Discussed options 

include PEG tube placement which does not prevent aspiration but allowed 

administration of her medications and meet her nutritional requirement versus 

hospice placement in view of her advance multiple sclerosis with complications. 

Family will get back to me with their decision. Meanwhile I will change her pain

management to IV Morphine 2 mg q 6 hours and IV Pepcid 20 mg q12 hours. Monitor 

heart rate for SVT and muscle spasticity problem for needed intervention.

## 2020-08-01 RX ADMIN — SODIUM CHLORIDE AND POTASSIUM CHLORIDE PRN MLS/HR: 9; 2.98 INJECTION, SOLUTION INTRAVENOUS at 03:58

## 2020-08-01 RX ADMIN — Medication SCH UNIT: at 21:27

## 2020-08-01 RX ADMIN — MORPHINE SULFATE PRN MG: 10 INJECTION INTRAMUSCULAR; INTRAVENOUS; SUBCUTANEOUS at 11:08

## 2020-08-01 RX ADMIN — FAMOTIDINE SCH MG: 10 INJECTION INTRAVENOUS at 21:27

## 2020-08-01 RX ADMIN — MORPHINE SULFATE PRN MG: 10 INJECTION INTRAMUSCULAR; INTRAVENOUS; SUBCUTANEOUS at 17:01

## 2020-08-01 RX ADMIN — Medication SCH UNIT: at 05:03

## 2020-08-01 RX ADMIN — ENOXAPARIN SODIUM SCH MG: 40 INJECTION SUBCUTANEOUS at 10:36

## 2020-08-01 RX ADMIN — MORPHINE SULFATE PRN MG: 10 INJECTION INTRAMUSCULAR; INTRAVENOUS; SUBCUTANEOUS at 05:08

## 2020-08-01 RX ADMIN — FOLIC ACID SCH MLS/HR: 5 INJECTION, SOLUTION INTRAMUSCULAR; INTRAVENOUS; SUBCUTANEOUS at 17:01

## 2020-08-01 RX ADMIN — FAMOTIDINE SCH MG: 10 INJECTION INTRAVENOUS at 10:36

## 2020-08-01 RX ADMIN — Medication PRN UNIT: at 10:36

## 2020-08-01 RX ADMIN — DOXYCYCLINE SCH MLS/HR: 100 INJECTION, POWDER, LYOPHILIZED, FOR SOLUTION INTRAVENOUS at 21:27

## 2020-08-01 RX ADMIN — Medication SCH: at 13:16

## 2020-08-01 RX ADMIN — DOXYCYCLINE SCH MLS/HR: 100 INJECTION, POWDER, LYOPHILIZED, FOR SOLUTION INTRAVENOUS at 10:36

## 2020-08-01 NOTE — PDOC PROGRESS REPORT
Subjective


Progress Note for:: 08/01/20


Subjective:: 





She has advanced MS, basically debilitated, family has opted for hospice, this 

to be arranged by discharge planning


Reason For Visit: 


ACUTE RESPIRATORY DISTRESS,SEPSIS,URINARY TRACT








Physical Exam


Vital Signs: 


                                        











Temp Pulse Resp BP Pulse Ox


 


 97.5 F   105 H  16   125/62   100 


 


 08/01/20 11:37  08/01/20 11:37  08/01/20 11:37  08/01/20 11:37  08/01/20 11:37








                                 Intake & Output











 07/31/20 08/01/20 08/02/20





 06:59 06:59 06:59


 


Intake Total 240 1373 250


 


Output Total 750 625 


 


Balance -510 748 250


 


Weight 75.5 kg 74.2 kg 














Results


Laboratory Results: 


                                        





                                 07/26/20 08:00 





                                 07/31/20 10:20 





                                        











  07/25/20 07/25/20





  08:45 08:45


 


Creatine Kinase  30 


 


Troponin I   0.050


 


NT-Pro-B Natriuret Pep   6640 H











Impressions: 


                                        





Chest X-Ray  07/25/20 08:59


IMPRESSION:  NO ACUTE RADIOGRAPHIC FINDING IN THE CHEST.


 














Assessment & Plan





- Diagnosis


(1) Sepsis


Qualifiers: 


   Sepsis type: sepsis due to unspecified organism   Sepsis acute organ 

dysfunction status: with acute organ dysfunction   Severe sepsis acute organ 

dysfunction type: acute respiratory failure   Acute respiratory failure type: 

with hypercapnia   Severe sepsis shock status: unspecified   Qualified Code(s): 

A41.9 - Sepsis, unspecified organism; R65.20 - Severe sepsis without septic 

shock; J96.02 - Acute respiratory failure with hypercapnia   


Is this a current diagnosis for this admission?: Yes   





(2) Multiple sclerosis, primary chronic progressive


Is this a current diagnosis for this admission?: Yes   


Plan: 


Patient with severe disabling multiple sclerosis, recurrent hospitalization, 

family is opting for hospice, discharge planning will arrange for that








- Time


Time Spent with patient: Less than 15 minutes


Level of Care: IMCU


Medications reviewed and adjusted accordingly: Yes


Anticipated discharge: Hospice


Anticipated DC Timeframe: when bed available

## 2020-08-02 RX ADMIN — DOXYCYCLINE SCH MLS/HR: 100 INJECTION, POWDER, LYOPHILIZED, FOR SOLUTION INTRAVENOUS at 21:06

## 2020-08-02 RX ADMIN — Medication SCH UNIT: at 05:26

## 2020-08-02 RX ADMIN — ENOXAPARIN SODIUM SCH MG: 40 INJECTION SUBCUTANEOUS at 09:20

## 2020-08-02 RX ADMIN — FOLIC ACID SCH MLS/HR: 5 INJECTION, SOLUTION INTRAMUSCULAR; INTRAVENOUS; SUBCUTANEOUS at 17:45

## 2020-08-02 RX ADMIN — FAMOTIDINE SCH MG: 10 INJECTION INTRAVENOUS at 09:19

## 2020-08-02 RX ADMIN — SODIUM CHLORIDE AND POTASSIUM CHLORIDE PRN MLS/HR: 9; 2.98 INJECTION, SOLUTION INTRAVENOUS at 03:15

## 2020-08-02 RX ADMIN — Medication SCH UNIT: at 13:31

## 2020-08-02 RX ADMIN — DOXYCYCLINE SCH MLS/HR: 100 INJECTION, POWDER, LYOPHILIZED, FOR SOLUTION INTRAVENOUS at 09:19

## 2020-08-02 RX ADMIN — MORPHINE SULFATE PRN MG: 10 INJECTION INTRAMUSCULAR; INTRAVENOUS; SUBCUTANEOUS at 04:39

## 2020-08-02 RX ADMIN — FAMOTIDINE SCH MG: 10 INJECTION INTRAVENOUS at 21:06

## 2020-08-02 RX ADMIN — SODIUM CHLORIDE AND POTASSIUM CHLORIDE PRN MLS/HR: 9; 2.98 INJECTION, SOLUTION INTRAVENOUS at 17:50

## 2020-08-02 RX ADMIN — Medication SCH UNIT: at 21:06

## 2020-08-02 RX ADMIN — MORPHINE SULFATE PRN MG: 10 INJECTION INTRAMUSCULAR; INTRAVENOUS; SUBCUTANEOUS at 16:04

## 2020-08-02 NOTE — PDOC PROGRESS REPORT
Subjective


Progress Note for:: 08/02/20


Subjective:: 





She has advanced MS, basically debilitated, family has opted for hospice, this 

to be arranged by discharge planning


Reason For Visit: 


ACUTE RESPIRATORY DISTRESS,SEPSIS,URINARY TRACT








Physical Exam


Vital Signs: 


                                        











Temp Pulse Resp BP Pulse Ox


 


 97.4 F   104 H  12   137/68 H  100 


 


 08/02/20 11:01  08/02/20 11:01  08/02/20 11:01  08/02/20 11:01  08/02/20 11:01








                                 Intake & Output











 08/01/20 08/02/20 08/03/20





 06:59 06:59 06:59


 


Intake Total 1373 2444 250


 


Output Total 625 2525 700


 


Balance 748 81 -450


 


Weight 74.2 kg 75.9 kg 














Results


Laboratory Results: 


                                        





                                 07/26/20 08:00 





                                 07/31/20 10:20 





                                        











  07/25/20 07/25/20





  08:45 08:45


 


Creatine Kinase  30 


 


Troponin I   0.050


 


NT-Pro-B Natriuret Pep   6640 H











Impressions: 


                                        





Chest X-Ray  07/25/20 08:59


IMPRESSION:  NO ACUTE RADIOGRAPHIC FINDING IN THE CHEST.


 














Assessment & Plan





- Diagnosis


(1) Sepsis


Qualifiers: 


   Sepsis type: sepsis due to unspecified organism   Sepsis acute organ 

dysfunction status: with acute organ dysfunction   Severe sepsis acute organ 

dysfunction type: acute respiratory failure   Acute respiratory failure type: 

with hypercapnia   Severe sepsis shock status: unspecified   Qualified Code(s): 

A41.9 - Sepsis, unspecified organism; R65.20 - Severe sepsis without septic 

shock; J96.02 - Acute respiratory failure with hypercapnia   


Is this a current diagnosis for this admission?: Yes   





(2) Multiple sclerosis, primary chronic progressive


Is this a current diagnosis for this admission?: Yes   





- Time


Time Spent with patient: Less than 15 minutes


Level of Care: IMCU


Medications reviewed and adjusted accordingly: Yes


Anticipated discharge: SNF


Anticipated DC Timeframe: when bed available

## 2020-08-03 RX ADMIN — DOXYCYCLINE SCH MLS/HR: 100 INJECTION, POWDER, LYOPHILIZED, FOR SOLUTION INTRAVENOUS at 10:12

## 2020-08-03 RX ADMIN — ENOXAPARIN SODIUM SCH MG: 40 INJECTION SUBCUTANEOUS at 10:47

## 2020-08-03 RX ADMIN — FOLIC ACID SCH: 5 INJECTION, SOLUTION INTRAMUSCULAR; INTRAVENOUS; SUBCUTANEOUS at 18:08

## 2020-08-03 RX ADMIN — FAMOTIDINE SCH MG: 10 INJECTION INTRAVENOUS at 21:21

## 2020-08-03 RX ADMIN — Medication SCH UNIT: at 14:03

## 2020-08-03 RX ADMIN — DOXYCYCLINE SCH MLS/HR: 100 INJECTION, POWDER, LYOPHILIZED, FOR SOLUTION INTRAVENOUS at 21:22

## 2020-08-03 RX ADMIN — MORPHINE SULFATE PRN MG: 10 INJECTION INTRAMUSCULAR; INTRAVENOUS; SUBCUTANEOUS at 21:35

## 2020-08-03 RX ADMIN — MORPHINE SULFATE PRN MG: 10 INJECTION INTRAMUSCULAR; INTRAVENOUS; SUBCUTANEOUS at 04:22

## 2020-08-03 RX ADMIN — SODIUM CHLORIDE AND POTASSIUM CHLORIDE PRN MLS/HR: 9; 2.98 INJECTION, SOLUTION INTRAVENOUS at 17:47

## 2020-08-03 RX ADMIN — Medication SCH UNIT: at 05:52

## 2020-08-03 RX ADMIN — FAMOTIDINE SCH MG: 10 INJECTION INTRAVENOUS at 10:12

## 2020-08-03 RX ADMIN — FOLIC ACID SCH MLS/HR: 5 INJECTION, SOLUTION INTRAMUSCULAR; INTRAVENOUS; SUBCUTANEOUS at 18:18

## 2020-08-03 RX ADMIN — MORPHINE SULFATE PRN MG: 10 INJECTION INTRAMUSCULAR; INTRAVENOUS; SUBCUTANEOUS at 15:16

## 2020-08-03 RX ADMIN — SODIUM CHLORIDE AND POTASSIUM CHLORIDE PRN MLS/HR: 9; 2.98 INJECTION, SOLUTION INTRAVENOUS at 03:59

## 2020-08-03 RX ADMIN — Medication SCH UNIT: at 21:21

## 2020-08-03 NOTE — PDOC PROGRESS REPORT
Subjective


Progress Note for:: 08/03/20


Subjective:: 


No reported fever or chills. No chest pain or difficulty with her breathing. P.O

intake almost nothing except for sips of ensure supplement. I discussed further 

with Carla, daughter, I was informed that family have decided to place patient in 

hospice care at this time and she will be in DNR status.


Reason For Visit: 


ACUTE RESPIRATORY DISTRESS,SEPSIS,URINARY TRACT








Physical Exam


Vital Signs: 


                                        











Temp Pulse Resp BP Pulse Ox


 


 98.1 F   102 H  22 H  121/64   100 


 


 08/02/20 19:33  08/03/20 07:00  08/02/20 19:33  08/02/20 19:33  08/02/20 19:33








                                 Intake & Output











 08/02/20 08/03/20 08/04/20





 06:59 06:59 06:59


 


Intake Total 2444 2523 50


 


Output Total 2525 2600 350


 


Balance -81 -77 -300


 


Weight 75.9 kg 74.2 kg 











Physical Exam: 





General appearance: PRESENT: no acute distress


Head exam: PRESENT: atraumatic, normocephalic


Eye exam: PRESENT: conjunctiva pink.  ABSENT: pallor, scleral icterus


Mouth exam: PRESENT: fairly moist


Respiratory exam: PRESENT: decreased breath sounds - at lung bases


Cardiovascular exam: PRESENT: RRR, +S1, +S2.  ABSENT: diastolic murmur, rubs, 

systolic murmur


GI/Abdominal exam: PRESENT: normal bowel sounds, soft.  ABSENT: distended, 

guarding, mass, organomegaly, rebound, tenderness


Neurological exam: PRESENT: alert and awake.


Psychiatric exam: PRESENT: Minimal verbal inaction.  ABSENT: homicidal ideation,

suicidal ideation


Skin exam: PRESENT: dry, warm, other - stage 4 bilateral ischial pressure ulcers





Results


Laboratory Results: 


                                        





                                 07/26/20 08:00 





                                 07/31/20 10:20 





                                        











  07/25/20 07/25/20





  08:45 08:45


 


Creatine Kinase  30 


 


Troponin I   0.050


 


NT-Pro-B Natriuret Pep   6640 H











Impressions: 


                                        





Chest X-Ray  07/25/20 08:59


IMPRESSION:  NO ACUTE RADIOGRAPHIC FINDING IN THE CHEST.


 














Assessment & Plan





- Diagnosis


(1) Acute respiratory distress


Is this a current diagnosis for this admission?: Yes   





(2) Urinary tract infection


Qualifiers: 


 


Is this a current diagnosis for this admission?: Yes   





(3) Sepsis


Qualifiers: 


   Sepsis type: sepsis due to unspecified organism   Sepsis acute organ 

dysfunction status: with acute organ dysfunction   Severe sepsis acute organ 

dysfunction type: acute respiratory failure   Acute respiratory failure type: 

with hypercapnia   Severe sepsis shock status: unspecified   Qualified Code(s): 

A41.9 - Sepsis, unspecified organism; R65.20 - Severe sepsis without septic 

shock; J96.02 - Acute respiratory failure with hypercapnia   


Is this a current diagnosis for this admission?: Yes   





(4) Staghorn kidney stones


Is this a current diagnosis for this admission?: Yes   





(5) Hydronephrosis of left kidney


Is this a current diagnosis for this admission?: Yes   





(6) Multiple sclerosis, primary chronic progressive


Is this a current diagnosis for this admission?: Yes   





(7) Pressure ulcer of ischial area, stage 4


Qualifiers: 


   Laterality: unspecified laterality   Qualified Code(s): L89.304 - Pressure 

ulcer of unspecified buttock, stage 4   


Is this a current diagnosis for this admission?: Yes   





- Time


Time Spent with patient: 25-34 minutes


Level of Care: IMCU


Medications reviewed and adjusted accordingly: Yes


Anticipated discharge: Hospice


Anticipated DC Timeframe: Other





- Inpatient Certification


Based on my medical assessment, after consideration of the patient's 

comorbidities, presenting symptoms, or acuity I expect that the services needed 

warrant INPATIENT care.: Yes


I certify that my determination is in accordance with my understanding of 

Medicare's requirements for reasonable and necessary INPATIENT services [42 CFR 

412.3e].: Yes


Medical Necessity: Significant Comorbidiites Make Outpatient Treatment Too 

Risky, Need Close Monitoring Due to Risk of Patient Decompensation, Need For IV 

Fluids, Need For Continuous Telemetry Monitoring, Need for IV Antibiotics, Risk 

of Complication if Not Cared For in Hospital, Risk of Diagnosis Which Will Re

quire Inpatient Eval/Care/Monitoring


Post Hospital Care: D/C Planner Documentation





- Plan Summary


Plan Summary: 





Continue current medication management and supportive care. Patient will be made

DNR status and I will request hospice placement.

## 2020-08-04 RX ADMIN — FOLIC ACID SCH MLS/HR: 5 INJECTION, SOLUTION INTRAMUSCULAR; INTRAVENOUS; SUBCUTANEOUS at 17:25

## 2020-08-04 RX ADMIN — ENOXAPARIN SODIUM SCH MG: 40 INJECTION SUBCUTANEOUS at 09:18

## 2020-08-04 RX ADMIN — Medication SCH UNIT: at 17:25

## 2020-08-04 RX ADMIN — DOXYCYCLINE SCH MLS/HR: 100 INJECTION, POWDER, LYOPHILIZED, FOR SOLUTION INTRAVENOUS at 22:19

## 2020-08-04 RX ADMIN — Medication SCH UNIT: at 05:40

## 2020-08-04 RX ADMIN — DOXYCYCLINE SCH MLS/HR: 100 INJECTION, POWDER, LYOPHILIZED, FOR SOLUTION INTRAVENOUS at 09:17

## 2020-08-04 RX ADMIN — MORPHINE SULFATE PRN MG: 10 INJECTION INTRAMUSCULAR; INTRAVENOUS; SUBCUTANEOUS at 15:45

## 2020-08-04 RX ADMIN — FAMOTIDINE SCH MG: 10 INJECTION INTRAVENOUS at 22:17

## 2020-08-04 RX ADMIN — FAMOTIDINE SCH MG: 10 INJECTION INTRAVENOUS at 09:17

## 2020-08-04 RX ADMIN — MORPHINE SULFATE PRN MG: 10 INJECTION INTRAMUSCULAR; INTRAVENOUS; SUBCUTANEOUS at 03:47

## 2020-08-04 RX ADMIN — Medication SCH UNIT: at 22:18

## 2020-08-04 NOTE — PDOC DISCHARGE SUMMARY
Impression





- Admit/DC Date/PCP


Admission Date/Primary Care Provider: 


  07/25/20 12:45





  MEG MCCARTHY





Discharge Date: 08/04/20





- Discharge Diagnosis


(1) Acute respiratory distress


Is this a current diagnosis for this admission?: Yes   





(2) Urinary tract infection


Is this a current diagnosis for this admission?: Yes   





(3) Sepsis


Is this a current diagnosis for this admission?: Yes   





(4) Staghorn kidney stones


Is this a current diagnosis for this admission?: Yes   





(5) Hydronephrosis of left kidney


Is this a current diagnosis for this admission?: Yes   





(6) Multiple sclerosis, primary chronic progressive


Is this a current diagnosis for this admission?: Yes   





(7) Pressure ulcer of ischial area, stage 4


Is this a current diagnosis for this admission?: Yes   





- Assessment


Summary: 


She was admitted for acute respiratory distress with septic features and urinary

tract infection. Her advanced multiple sclerosis has become complicated with 

recurrent aspiration with oral feeding. Patient and family have arrived at 

hospice placement at home. Her bilateral ischial pressure ulcers have progressed

to stage 4 with associated malnutrition has further complicate patient 

prognosis. She will be discharged to hospice program today with the program 

director or her assigned physician taking over her care plan, medication 

management and oversight. 





- Additional Information


Resuscitation Status: Do Not Resuscitate


Referrals: 


MEG MCCARTHY MD [Primary Care Provider] - Follow up as needed


Home Medications: 








Baclofen [Baclofen 10 mg Tablet] 10 mg PO Q8HP PRN 06/29/20 











History of Present Illiness


History of Present Illness: 


VI PAIGE is a 55 year old female known to my practice who was 

discharged home on 07/24/2020 after presenting with altered mental status and 

treated for UTI as well as aspiration pneumonia / pneumonitis with respiratory 

decompensation and brief transfer to ICU during that admission. Also, her CT 

abdomen and pelvis revealed staghorn calculi with hydronephrosis as possible 

predisposing factor for her recurrent UTI. Complicating her medical morbidities 

is development of bilateral sacral decubitus ulcers and her underlying 

immobility due to advance multiple sclerosis. She was brought back to the ED via

EMS due to development of respiratory distress. Family reported that patient 

tolerated oral feeding at lunch and diner but was left in recumbent position 

after diner and found in respiratory distress this morning. EMS personnel found 

her hypotensive with need for IO infusion therapy. Her initial ED evaluation was

significant for hypotension, tachycardia, tachypnea, leukocytosis, abnormal 

urinalysis, and hypercapnia. Her morbidities are as listed below. She was 

advised hospitalization for further evaluation and treatment.








Hospital Course


Hospital Course: 


She was admitted for acute respiratory distress with septic features and urinary

tract infection. Her advanced multiple sclerosis has become complicated with 

recurrent aspiration with oral feeding. Patient and family have arrived at 

hospice placement at home. Her bilateral ischial pressure ulcers have progressed

to stage 4 with associated malnutrition has further complicate patient 

prognosis. She will be discharged to hospice program today with the program 

director or her assigned physician taking over her care plan, medication 

management and oversight. 





Physical Exam


Vital Signs: 


                                        











Temp Pulse Resp BP Pulse Ox


 


 98.2 F   107 H  20   126/70 H  97 


 


 08/04/20 16:11  08/04/20 16:11  08/04/20 16:11  08/04/20 16:11  08/04/20 16:11








                                 Intake & Output











 08/03/20 08/04/20 08/05/20





 06:59 06:59 06:59


 


Intake Total 2523 1946 1273


 


Output Total 2600 2225 


 


Balance -77 -279 1273


 


Weight 74.2 kg 76.5 kg 














General appearance: PRESENT: no acute distress


Head exam: PRESENT: atraumatic, normocephalic


Eye exam: PRESENT: conjunctiva pink.  ABSENT: pallor, scleral icterus


Mouth exam: PRESENT: fairly moist


Respiratory exam: PRESENT: decreased breath sounds - at lung bases


Cardiovascular exam: PRESENT: RRR, +S1, +S2.  ABSENT: diastolic murmur, rubs, 

systolic murmur


GI/Abdominal exam: PRESENT: normal bowel sounds, soft.  ABSENT: distended, 

guarding, mass, organomegaly, rebound, tenderness


Neurological exam: PRESENT: alert and awake.


Psychiatric exam: PRESENT: Minimal verbal inaction.  ABSENT: homicidal ideation,

suicidal ideation


Skin exam: PRESENT: dry, warm, other - stage 4 bilateral ischial region pressure

ulcers














Results


Laboratory Results: 


                                        











WBC  7.2 10^3/uL (4.0-10.5)   07/26/20  08:00    


 


RBC  3.42 10^6/uL (3.72-5.28)  L  07/26/20  08:00    


 


Hgb  9.2 g/dL (12.0-15.5)  L D 07/26/20  08:00    


 


Hct  27.8 % (36.0-47.0)  L  07/26/20  08:00    


 


MCV  82 fl (80-97)   07/26/20  08:00    


 


MCH  27.0 pg (27.0-33.4)   07/26/20  08:00    


 


MCHC  33.2 g/dL (32.0-36.0)   07/26/20  08:00    


 


RDW  17.2 % (11.5-14.0)  H  07/26/20  08:00    


 


Plt Count  245 10^3/uL (150-450)   07/26/20  08:00    


 


Lymph % (Auto)  9.8 % (13-45)  L  07/26/20  08:00    


 


Mono % (Auto)  6.7 % (3-13)   07/26/20  08:00    


 


Eos % (Auto)  1.4 % (0-6)   07/26/20  08:00    


 


Baso % (Auto)  0.6 % (0-2)   07/26/20  08:00    


 


Absolute Neuts (auto)  5.9 10^3/uL (1.7-8.2)   07/26/20  08:00    


 


Absolute Lymphs (auto)  0.7 10^3/uL (0.5-4.7)   07/26/20  08:00    


 


Absolute Monos (auto)  0.5 10^3/uL (0.1-1.4)   07/26/20  08:00    


 


Absolute Eos (auto)  0.1 10^3/uL (0.0-0.6)   07/26/20  08:00    


 


Absolute Basos (auto)  0.0 10^3/uL (0.0-0.2)   07/26/20  08:00    


 


Seg Neutrophils %  81.5 % (42-78)  H  07/26/20  08:00    


 


Platelet Estimate  Cancelled   07/26/20  06:25    


 


PT  14.8 SEC (11.4-15.4)   07/25/20  08:45    


 


INR  1.15   07/25/20  08:45    


 


Carbonic Acid  1.40 mmol/L (1.05-1.35)  H  07/25/20  09:45    


 


HCO3/H2CO3 Ratio  19:1   07/25/20  09:45    


 


ABG pH  7.38  (7.35-7.45)   07/25/20  09:45    


 


ABG pCO2  46.5 mmHg (35-45)  H  07/25/20  09:45    


 


ABG pO2  96.6 mmHg ()   07/25/20  09:45    


 


ABG HCO3  26.6 mmol/L (20-24)  H  07/25/20  09:45    


 


ABG Total CO2  28.0 mmol/L (21-25)  H  07/25/20  09:45    


 


ABG O2 Saturation  97.2 % (94-98)   07/25/20  09:45    


 


ABG Base Excess  1.0 mmol/L  07/25/20  09:45    


 


VBG pH  7.25  (7.30-7.42)  L  07/25/20  08:45    


 


VBG pCO2  68.8 mmHg (35-63)  H*  07/25/20  08:45    


 


VBG HCO3  29.6 mmol/L (20-32)   07/25/20  08:45    


 


VBG Base Excess  0.7 mmol/L  07/25/20  08:45    


 


FiO2  65%   07/25/20  09:45    


 


Sodium  137.9 mmol/L (137-145)   07/26/20  06:25    


 


Potassium  3.4 mmol/L (3.6-5.0)  L D 07/26/20  06:25    


 


Chloride  106 mmol/L ()   07/26/20  06:25    


 


Carbon Dioxide  26 mmol/L (22-30)   07/26/20  06:25    


 


Anion Gap  6  (5-19)   07/26/20  06:25    


 


BUN  6 mg/dL (7-20)  L  07/26/20  06:25    


 


Creatinine  0.51 mg/dL (0.52-1.25)  L  07/31/20  10:20    


 


Est GFR ( Amer)  > 60  (>60)   07/31/20  10:20    


 


Est GFR (MDRD) Non-Af  > 60  (>60)   07/31/20  10:20    


 


Glucose  79 mg/dL ()   07/26/20  06:25    


 


POC Glucose  114 mg/dL ()  H  08/04/20  11:54    


 


Lactic Acid  1.1 mmol/L (0.7-2.1)   07/25/20  08:45    


 


Calcium  8.7 mg/dL (8.4-10.2)   07/26/20  06:25    


 


Total Bilirubin  0.6 mg/dL (0.2-1.3)   07/26/20  06:25    


 


Direct Bilirubin  0.1 mg/dL (0.0-0.4)   07/26/20  06:25    


 


Neonat Total Bilirubin  Not Reportable   07/26/20  06:25    


 


Neonat Direct Bilirubin  Not Reportable   07/26/20  06:25    


 


Neonat Indirect Bili  Not Reportable   07/26/20  06:25    


 


AST  21 U/L (14-36)   07/26/20  06:25    


 


ALT  10 U/L (<35)   07/26/20  06:25    


 


Alkaline Phosphatase  75 U/L ()   07/26/20  06:25    


 


Creatine Kinase  30 U/L ()   07/25/20  08:45    


 


Troponin I  0.050 ng/mL  07/25/20  08:45    


 


NT-Pro-B Natriuret Pep  6640 pg/mL (<125)  H  07/25/20  08:45    


 


Total Protein  6.1 g/dL (6.3-8.2)  L  07/26/20  06:25    


 


Albumin  2.3 g/dL (3.5-5.0)  L  07/26/20  06:25    


 


Lipase  63.1 U/L ()   07/25/20  08:45    


 


Random Cortisol  31.00 ug/dL (None Established)   07/25/20  08:45    


 


Urine Color  YELLOW   07/25/20  08:45    


 


Urine Appearance  SLIGHTLY-CLOUDY   07/25/20  08:45    


 


Urine pH  8.0  (5.0-9.0)   07/25/20  08:45    


 


Ur Specific Gravity  1.006   07/25/20  08:45    


 


Urine Protein  NEGATIVE mg/dL (NEGATIVE)   07/25/20  08:45    


 


Urine Glucose (UA)  NEGATIVE mg/dL (NEGATIVE)   07/25/20  08:45    


 


Urine Ketones  NEGATIVE mg/dL (NEGATIVE)   07/25/20  08:45    


 


Urine Blood  LARGE  (NEGATIVE)  H  07/25/20  08:45    


 


Urine Nitrite  NEGATIVE  (NEGATIVE)   07/25/20  08:45    


 


Urine Bilirubin  NEGATIVE  (NEGATIVE)   07/25/20  08:45    


 


Urine Urobilinogen  2.0 mg/dL (<2.0)  H  07/25/20  08:45    


 


Ur Leukocyte Esterase  LARGE  (NEGATIVE)  H  07/25/20  08:45    


 


Urine WBC (Auto)  75 /HPF  07/25/20  08:45    


 


Urine RBC (Auto)  34 /HPF  07/25/20  08:45    


 


Urine WBC Clumps  FEW /HPF  07/25/20  08:45    


 


Squamous Epi Cells Auto  2 /HPF  07/25/20  08:45    


 


Urine Mucus (Auto)  RARE /LPF  07/25/20  08:45    


 


Urine Ascorbic Acid  NEGATIVE  (NEGATIVE)   07/25/20  08:45    


 


Time Trough Drawn  1020   07/31/20  10:20    


 


Vancomycin Trough  14.6 ug/mL (5.0-20.0)   07/31/20  10:20    


 


Slides for Path Review  Cancelled   07/26/20  06:25    








                                        











  07/25/20





  08:45


 


Troponin I  0.050


 


NT-Pro-B Natriuret Pep  6640 H











Impressions: 


                                        





Chest X-Ray  07/25/20 00:00


IMPRESSION:  CENTRAL VENOUS ACCESS CATHETER IN APPROPRIATE LOCATION.  NO P

NEUMOTHORAX.  NEW CENTRAL LINE.


 








Chest X-Ray  07/25/20 08:59


IMPRESSION:  NO ACUTE RADIOGRAPHIC FINDING IN THE CHEST.


 














Plan


Health Concerns: 


Discharge to home hospice program for palliative hospice care.


Plan of Treatment: 


Comfort care with pain control under the management of the hospice medical dir

juno or assigned physician.


Goals: 


Comfort care with pain control.


Time Spent: Greater than 30 Minutes





Stroke


Is this a Stroke Patient?: No





Acute Heart Failure





- **


Is this a Heart Failure Patient?: No

## 2020-08-05 VITALS — DIASTOLIC BLOOD PRESSURE: 73 MMHG | SYSTOLIC BLOOD PRESSURE: 146 MMHG

## 2020-08-05 RX ADMIN — MORPHINE SULFATE PRN MG: 10 INJECTION INTRAMUSCULAR; INTRAVENOUS; SUBCUTANEOUS at 06:41

## 2020-08-05 RX ADMIN — ENOXAPARIN SODIUM SCH MG: 40 INJECTION SUBCUTANEOUS at 09:03

## 2020-08-05 RX ADMIN — SODIUM CHLORIDE AND POTASSIUM CHLORIDE PRN MLS/HR: 9; 2.98 INJECTION, SOLUTION INTRAVENOUS at 05:14

## 2020-08-05 RX ADMIN — Medication SCH UNIT: at 05:13

## 2020-08-05 RX ADMIN — FAMOTIDINE SCH MG: 10 INJECTION INTRAVENOUS at 09:03

## 2020-08-05 RX ADMIN — DOXYCYCLINE SCH MLS/HR: 100 INJECTION, POWDER, LYOPHILIZED, FOR SOLUTION INTRAVENOUS at 09:04
